# Patient Record
Sex: FEMALE | Race: BLACK OR AFRICAN AMERICAN | Employment: FULL TIME | URBAN - METROPOLITAN AREA
[De-identification: names, ages, dates, MRNs, and addresses within clinical notes are randomized per-mention and may not be internally consistent; named-entity substitution may affect disease eponyms.]

---

## 2017-01-11 RX ORDER — METOPROLOL SUCCINATE 100 MG/1
TABLET, EXTENDED RELEASE ORAL
Qty: 30 TABLET | Refills: 0 | Status: SHIPPED | OUTPATIENT
Start: 2017-01-11 | End: 2017-06-09

## 2017-01-11 RX ORDER — FUROSEMIDE 40 MG/1
TABLET ORAL
Qty: 30 TABLET | Refills: 0 | Status: SHIPPED | OUTPATIENT
Start: 2017-01-11 | End: 2017-03-22

## 2017-01-11 RX ORDER — ATORVASTATIN CALCIUM 10 MG/1
TABLET, FILM COATED ORAL
Qty: 30 TABLET | Refills: 0 | Status: SHIPPED | OUTPATIENT
Start: 2017-01-11 | End: 2017-06-09

## 2017-01-11 RX ORDER — LISINOPRIL 20 MG/1
TABLET ORAL
Qty: 30 TABLET | Refills: 0 | Status: SHIPPED | OUTPATIENT
Start: 2017-01-11 | End: 2017-03-22

## 2017-01-27 RX ORDER — ESCITALOPRAM OXALATE 10 MG/1
TABLET ORAL
Qty: 30 TABLET | Refills: 0 | Status: SHIPPED | OUTPATIENT
Start: 2017-01-27 | End: 2017-03-22

## 2017-03-22 RX ORDER — METOPROLOL SUCCINATE 100 MG/1
TABLET, EXTENDED RELEASE ORAL
Qty: 30 TABLET | Refills: 0 | Status: SHIPPED | OUTPATIENT
Start: 2017-03-22 | End: 2017-06-09

## 2017-03-22 RX ORDER — FUROSEMIDE 40 MG/1
TABLET ORAL
Qty: 30 TABLET | Refills: 0 | Status: SHIPPED | OUTPATIENT
Start: 2017-03-22 | End: 2017-04-06

## 2017-03-22 RX ORDER — LISINOPRIL 20 MG/1
TABLET ORAL
Qty: 30 TABLET | Refills: 0 | Status: SHIPPED | OUTPATIENT
Start: 2017-03-22 | End: 2017-04-06

## 2017-03-23 RX ORDER — ESCITALOPRAM OXALATE 10 MG/1
TABLET ORAL
Qty: 30 TABLET | Refills: 0 | Status: SHIPPED | OUTPATIENT
Start: 2017-03-23 | End: 2017-04-07

## 2017-04-07 RX ORDER — LISINOPRIL 20 MG/1
TABLET ORAL
Qty: 30 TABLET | Refills: 0 | Status: SHIPPED | OUTPATIENT
Start: 2017-04-07 | End: 2017-06-08

## 2017-04-07 RX ORDER — METOPROLOL SUCCINATE 100 MG/1
TABLET, EXTENDED RELEASE ORAL
Qty: 30 TABLET | Refills: 0 | Status: SHIPPED | OUTPATIENT
Start: 2017-04-07 | End: 2017-05-08

## 2017-04-07 RX ORDER — ESCITALOPRAM OXALATE 10 MG/1
TABLET ORAL
Qty: 30 TABLET | Refills: 0 | Status: SHIPPED | OUTPATIENT
Start: 2017-04-07 | End: 2017-05-08

## 2017-04-07 RX ORDER — FUROSEMIDE 40 MG/1
TABLET ORAL
Qty: 30 TABLET | Refills: 0 | Status: SHIPPED | OUTPATIENT
Start: 2017-04-07 | End: 2017-06-08

## 2017-05-08 RX ORDER — METOPROLOL SUCCINATE 100 MG/1
100 TABLET, EXTENDED RELEASE ORAL
Qty: 15 TABLET | Refills: 0 | Status: SHIPPED | OUTPATIENT
Start: 2017-05-08 | End: 2017-06-09

## 2017-05-08 RX ORDER — ESCITALOPRAM OXALATE 10 MG/1
TABLET ORAL
Qty: 15 TABLET | Refills: 0 | Status: SHIPPED | OUTPATIENT
Start: 2017-05-08 | End: 2017-06-09

## 2017-05-08 RX ORDER — LISINOPRIL 20 MG/1
20 TABLET ORAL
Qty: 15 TABLET | Refills: 0 | Status: SHIPPED | OUTPATIENT
Start: 2017-05-08 | End: 2017-06-09

## 2017-05-08 RX ORDER — FUROSEMIDE 40 MG/1
TABLET ORAL
Qty: 15 TABLET | Refills: 0 | Status: SHIPPED | OUTPATIENT
Start: 2017-05-08 | End: 2017-06-09

## 2017-06-09 ENCOUNTER — OFFICE VISIT (OUTPATIENT)
Dept: INTERNAL MEDICINE CLINIC | Facility: CLINIC | Age: 57
End: 2017-06-09

## 2017-06-09 VITALS
BODY MASS INDEX: 35 KG/M2 | HEART RATE: 73 BPM | SYSTOLIC BLOOD PRESSURE: 142 MMHG | WEIGHT: 222.5 LBS | DIASTOLIC BLOOD PRESSURE: 100 MMHG | TEMPERATURE: 98 F | OXYGEN SATURATION: 97 %

## 2017-06-09 DIAGNOSIS — F32.A DEPRESSION, UNSPECIFIED DEPRESSION TYPE: ICD-10-CM

## 2017-06-09 DIAGNOSIS — I10 ESSENTIAL HYPERTENSION, BENIGN: Primary | ICD-10-CM

## 2017-06-09 DIAGNOSIS — I25.9 CHRONIC ISCHEMIC HEART DISEASE, UNSPECIFIED: ICD-10-CM

## 2017-06-09 DIAGNOSIS — E78.2 MIXED HYPERLIPIDEMIA: ICD-10-CM

## 2017-06-09 DIAGNOSIS — E66.9 OBESITY, UNSPECIFIED: ICD-10-CM

## 2017-06-09 PROCEDURE — 99214 OFFICE O/P EST MOD 30 MIN: CPT | Performed by: INTERNAL MEDICINE

## 2017-06-09 RX ORDER — ATORVASTATIN CALCIUM 10 MG/1
10 TABLET, FILM COATED ORAL NIGHTLY
Qty: 30 TABLET | Refills: 3 | Status: SHIPPED | OUTPATIENT
Start: 2017-06-09 | End: 2017-11-30

## 2017-06-09 RX ORDER — ESCITALOPRAM OXALATE 10 MG/1
TABLET ORAL
Qty: 30 TABLET | Refills: 0 | Status: SHIPPED | OUTPATIENT
Start: 2017-06-09 | End: 2017-10-04

## 2017-06-09 RX ORDER — LISINOPRIL 20 MG/1
TABLET ORAL
Qty: 30 TABLET | Refills: 0 | Status: SHIPPED | OUTPATIENT
Start: 2017-06-09 | End: 2017-07-09

## 2017-06-09 RX ORDER — METOPROLOL SUCCINATE 100 MG/1
TABLET, EXTENDED RELEASE ORAL
Qty: 30 TABLET | Refills: 0 | Status: SHIPPED | OUTPATIENT
Start: 2017-06-09 | End: 2017-11-27

## 2017-06-09 RX ORDER — FUROSEMIDE 40 MG/1
TABLET ORAL
Qty: 30 TABLET | Refills: 0 | Status: SHIPPED | OUTPATIENT
Start: 2017-06-09 | End: 2018-04-13

## 2017-06-09 RX ORDER — SPIRONOLACTONE 25 MG/1
25 TABLET ORAL
Qty: 30 TABLET | Refills: 3 | Status: SHIPPED | OUTPATIENT
Start: 2017-06-09 | End: 2017-12-28

## 2017-06-09 NOTE — PROGRESS NOTES
Chitra Pratt is a 49year old female. HPI:   Patient presents with:  Medication Follow-Up  Patient presents for follow up on chronic medical issues.   Needs refills on lisinopril, metoprolol, atorvastatin, spironolactone, escitalopram.    Ran out of blo Take 1 tablet by mouth daily. , Disp: , Rfl:   •  aspirin 325 MG Oral Tab, Take 325 mg by mouth daily. , Disp: , Rfl:   •  [DISCONTINUED] lisinopril 20 MG Oral Tab, Take 1 tablet (20 mg total) by mouth once daily. , Disp: 15 tablet, Rfl: 0  •  [DISCONTINUED] CHF/ischemic heart disease  Occasional LE swelling. No worsening dyspnea on exertion, no chest pain. Continue BB, ACEi, spironolactone, furosemide. 3.  Depression  Worsening symptoms past few months - father passed away 3 months ago.   Escitalopram refi

## 2017-06-09 NOTE — PATIENT INSTRUCTIONS
- Take a daily allergy medication. Options include cetirizine, loratadine, and fexofenadine  - Also use steroid nasal sprays such as fluticasone, mometasone, Flonase, Nasocort, Nasonex, Rhinocort. It was a pleasure seeing you in the clinic today.   Than

## 2017-07-11 RX ORDER — LISINOPRIL 20 MG/1
TABLET ORAL
Qty: 30 TABLET | Refills: 0 | Status: SHIPPED | OUTPATIENT
Start: 2017-07-11 | End: 2017-10-04

## 2017-07-11 RX ORDER — FUROSEMIDE 40 MG/1
TABLET ORAL
Qty: 30 TABLET | Refills: 0 | Status: SHIPPED | OUTPATIENT
Start: 2017-07-11 | End: 2017-10-04

## 2017-10-04 RX ORDER — ERGOCALCIFEROL 1.25 MG/1
CAPSULE ORAL
Qty: 8 CAPSULE | Refills: 0 | OUTPATIENT
Start: 2017-10-04

## 2017-10-04 RX ORDER — FUROSEMIDE 40 MG/1
TABLET ORAL
Qty: 15 TABLET | Refills: 0 | OUTPATIENT
Start: 2017-10-04

## 2017-10-04 RX ORDER — ESCITALOPRAM OXALATE 10 MG/1
TABLET ORAL
Qty: 15 TABLET | Refills: 0 | OUTPATIENT
Start: 2017-10-04

## 2017-10-05 RX ORDER — ESCITALOPRAM OXALATE 10 MG/1
TABLET ORAL
Qty: 30 TABLET | Refills: 0 | Status: SHIPPED | OUTPATIENT
Start: 2017-10-05 | End: 2017-11-28

## 2017-10-05 RX ORDER — LISINOPRIL 20 MG/1
TABLET ORAL
Qty: 30 TABLET | Refills: 0 | Status: SHIPPED | OUTPATIENT
Start: 2017-10-05 | End: 2017-11-24

## 2017-10-05 RX ORDER — FUROSEMIDE 40 MG/1
TABLET ORAL
Qty: 30 TABLET | Refills: 0 | Status: SHIPPED | OUTPATIENT
Start: 2017-10-05 | End: 2017-11-22

## 2017-10-26 ENCOUNTER — TELEPHONE (OUTPATIENT)
Dept: INTERNAL MEDICINE CLINIC | Facility: CLINIC | Age: 57
End: 2017-10-26

## 2017-10-26 DIAGNOSIS — Z12.39 BREAST CANCER SCREENING: Primary | ICD-10-CM

## 2017-11-16 RX ORDER — ERGOCALCIFEROL 1.25 MG/1
CAPSULE ORAL
Qty: 8 CAPSULE | Refills: 0 | OUTPATIENT
Start: 2017-11-16

## 2017-11-24 RX ORDER — FUROSEMIDE 40 MG/1
TABLET ORAL
Qty: 30 TABLET | Refills: 0 | Status: SHIPPED | OUTPATIENT
Start: 2017-11-24 | End: 2018-02-27

## 2017-11-24 NOTE — TELEPHONE ENCOUNTER
Notes Recorded by Renan Austin MD on 9/29/2016 at 9:30 PM  Chol is not at goal  Taking meds as directed?  Otherwise stable    Ref Range & Units 9/29/16  1:31 PM   Glucose 70 - 99 mg/dL 102     BUN 8 - 20 mg/dL 16    Creatinine 0.55 - 1.02 mg/dL 1.21

## 2017-11-27 RX ORDER — LISINOPRIL 20 MG/1
TABLET ORAL
Qty: 30 TABLET | Refills: 0 | Status: SHIPPED | OUTPATIENT
Start: 2017-11-27 | End: 2018-04-13

## 2017-11-28 RX ORDER — METOPROLOL SUCCINATE 100 MG/1
TABLET, EXTENDED RELEASE ORAL
Qty: 30 TABLET | Refills: 0 | Status: SHIPPED | OUTPATIENT
Start: 2017-11-28 | End: 2018-04-13

## 2017-11-28 NOTE — TELEPHONE ENCOUNTER
Medication(s) to Refill:   Pending Prescriptions Disp Refills    METOPROLOL SUCCINATE  MG Oral Tablet 24 Hr [Pharmacy Med Name: METOPROLOL ER SUCCINATE 100MG TABS] 30 tablet 0     Sig: TAKE 1 TABLET BY MOUTH ONCE DAILY           Last Time Medication

## 2017-11-29 RX ORDER — LISINOPRIL 20 MG/1
TABLET ORAL
Qty: 30 TABLET | Refills: 0 | OUTPATIENT
Start: 2017-11-29

## 2017-11-29 RX ORDER — ESCITALOPRAM OXALATE 10 MG/1
TABLET ORAL
Qty: 30 TABLET | Refills: 0 | Status: SHIPPED | OUTPATIENT
Start: 2017-11-29 | End: 2017-11-30

## 2017-11-30 ENCOUNTER — PRIOR ORIGINAL RECORDS (OUTPATIENT)
Dept: OTHER | Age: 57
End: 2017-11-30

## 2017-11-30 ENCOUNTER — LAB ENCOUNTER (OUTPATIENT)
Dept: LAB | Age: 57
End: 2017-11-30
Attending: FAMILY MEDICINE
Payer: COMMERCIAL

## 2017-11-30 ENCOUNTER — OFFICE VISIT (OUTPATIENT)
Dept: INTERNAL MEDICINE CLINIC | Facility: CLINIC | Age: 57
End: 2017-11-30

## 2017-11-30 ENCOUNTER — HOSPITAL ENCOUNTER (OUTPATIENT)
Dept: MAMMOGRAPHY | Age: 57
Discharge: HOME | End: 2017-11-30
Attending: FAMILY MEDICINE
Payer: COMMERCIAL

## 2017-11-30 VITALS
WEIGHT: 222.5 LBS | SYSTOLIC BLOOD PRESSURE: 132 MMHG | OXYGEN SATURATION: 96 % | HEART RATE: 85 BPM | HEIGHT: 67.5 IN | RESPIRATION RATE: 16 BRPM | DIASTOLIC BLOOD PRESSURE: 86 MMHG | BODY MASS INDEX: 34.51 KG/M2 | TEMPERATURE: 99 F

## 2017-11-30 DIAGNOSIS — Z12.31 VISIT FOR SCREENING MAMMOGRAM: ICD-10-CM

## 2017-11-30 DIAGNOSIS — F17.200 TOBACCO USE DISORDER: ICD-10-CM

## 2017-11-30 DIAGNOSIS — Z01.89 LABORATORY EXAMINATION: ICD-10-CM

## 2017-11-30 DIAGNOSIS — Z12.11 COLON CANCER SCREENING: ICD-10-CM

## 2017-11-30 DIAGNOSIS — Z23 NEEDS FLU SHOT: ICD-10-CM

## 2017-11-30 DIAGNOSIS — Z23 NEED FOR DIPHTHERIA-TETANUS-PERTUSSIS (TDAP) VACCINE: ICD-10-CM

## 2017-11-30 DIAGNOSIS — Z00.00 WELLNESS EXAMINATION: Primary | ICD-10-CM

## 2017-11-30 PROCEDURE — 81003 URINALYSIS AUTO W/O SCOPE: CPT

## 2017-11-30 PROCEDURE — 90715 TDAP VACCINE 7 YRS/> IM: CPT | Performed by: FAMILY MEDICINE

## 2017-11-30 PROCEDURE — 99396 PREV VISIT EST AGE 40-64: CPT | Performed by: FAMILY MEDICINE

## 2017-11-30 PROCEDURE — 87624 HPV HI-RISK TYP POOLED RSLT: CPT | Performed by: FAMILY MEDICINE

## 2017-11-30 PROCEDURE — 88175 CYTOPATH C/V AUTO FLUID REDO: CPT | Performed by: FAMILY MEDICINE

## 2017-11-30 PROCEDURE — 90472 IMMUNIZATION ADMIN EACH ADD: CPT | Performed by: FAMILY MEDICINE

## 2017-11-30 PROCEDURE — 36415 COLL VENOUS BLD VENIPUNCTURE: CPT

## 2017-11-30 PROCEDURE — 90471 IMMUNIZATION ADMIN: CPT | Performed by: FAMILY MEDICINE

## 2017-11-30 PROCEDURE — 84443 ASSAY THYROID STIM HORMONE: CPT

## 2017-11-30 PROCEDURE — 86803 HEPATITIS C AB TEST: CPT

## 2017-11-30 PROCEDURE — 80061 LIPID PANEL: CPT

## 2017-11-30 PROCEDURE — 77067 SCR MAMMO BI INCL CAD: CPT | Performed by: FAMILY MEDICINE

## 2017-11-30 PROCEDURE — 85025 COMPLETE CBC W/AUTO DIFF WBC: CPT

## 2017-11-30 PROCEDURE — 80053 COMPREHEN METABOLIC PANEL: CPT

## 2017-11-30 PROCEDURE — 82306 VITAMIN D 25 HYDROXY: CPT

## 2017-11-30 PROCEDURE — 90686 IIV4 VACC NO PRSV 0.5 ML IM: CPT | Performed by: FAMILY MEDICINE

## 2017-11-30 RX ORDER — ESCITALOPRAM OXALATE 20 MG/1
10 TABLET ORAL
Qty: 30 TABLET | Refills: 5 | Status: SHIPPED | OUTPATIENT
Start: 2017-11-30 | End: 2020-08-18

## 2017-11-30 RX ORDER — ATORVASTATIN CALCIUM 10 MG/1
10 TABLET, FILM COATED ORAL NIGHTLY
Qty: 90 TABLET | Refills: 1 | Status: SHIPPED | OUTPATIENT
Start: 2017-11-30 | End: 2020-11-03

## 2017-11-30 RX ORDER — CYCLOBENZAPRINE HCL 10 MG
10 TABLET ORAL NIGHTLY
Qty: 30 TABLET | Refills: 1 | Status: SHIPPED | OUTPATIENT
Start: 2017-11-30 | End: 2017-12-20

## 2017-11-30 NOTE — PROGRESS NOTES
HPI:    Patient ID: Alma Garcia is a 56year old female.     HPI  HPI:   Alma Garcia is a 56year old female who presents for a complete physical exam. Symptoms: is menopausal.  Getting occ hot flashes   No menses   Asking for meds to help       Wt Readin FORMULA) Oral Tab Take 1 tablet by mouth daily. Disp:  Rfl:    aspirin 325 MG Oral Tab Take 325 mg by mouth daily.  Disp:  Rfl:       Past Medical History:   Diagnosis Date   • CAD (coronary artery disease)    • Myocardial infarction May, 1, 2012   • Other BMI 34.33 kg/m²   Body mass index is 34.33 kg/m².    GENERAL: well developed, well nourished,in no apparent distress  SKIN: no rashes,no unusual lesions  HEENT: atraumatic, normocephalic,ears and throat are clear  EYES:PERRLA, EOMI, ,conjunctiva are clear TAKE 1 TABLET BY MOUTH ONCE EVERY DAY Disp: 30 tablet Rfl: 0   atorvastatin 10 MG Oral Tab Take 1 tablet (10 mg total) by mouth nightly. Disp: 30 tablet Rfl: 3   spironolactone 25 MG Oral Tab Take 1 tablet (25 mg total) by mouth once daily.  Disp: 30 tablet

## 2017-12-05 DIAGNOSIS — E55.9 VITAMIN D DEFICIENCY: Primary | ICD-10-CM

## 2017-12-05 RX ORDER — ERGOCALCIFEROL 1.25 MG/1
50000 CAPSULE ORAL WEEKLY
Qty: 12 CAPSULE | Refills: 1 | Status: SHIPPED | OUTPATIENT
Start: 2017-12-05 | End: 2020-06-24

## 2017-12-26 RX ORDER — ESCITALOPRAM OXALATE 10 MG/1
TABLET ORAL
Qty: 30 TABLET | Refills: 0 | OUTPATIENT
Start: 2017-12-26

## 2017-12-29 RX ORDER — SPIRONOLACTONE 25 MG/1
TABLET ORAL
Qty: 30 TABLET | Refills: 0 | Status: SHIPPED | OUTPATIENT
Start: 2017-12-29 | End: 2018-04-11

## 2017-12-29 NOTE — TELEPHONE ENCOUNTER
Spironolactone 25 mg 1 tab daily filled 6-9-17 30 with 3 refills     LOV 11-30-17     The patient is asked to return for CPX in 1yr.     Labs 11-30-17 Overall is stable        New HIGH worning came up interactions with lisinopril

## 2018-01-30 RX ORDER — METOPROLOL SUCCINATE 100 MG/1
TABLET, EXTENDED RELEASE ORAL
Qty: 30 TABLET | Refills: 3 | Status: SHIPPED | OUTPATIENT
Start: 2018-01-30 | End: 2018-04-13

## 2018-02-27 RX ORDER — FUROSEMIDE 40 MG/1
TABLET ORAL
Qty: 30 TABLET | Refills: 0 | Status: SHIPPED | OUTPATIENT
Start: 2018-02-27 | End: 2018-04-13

## 2018-04-12 ENCOUNTER — TELEPHONE (OUTPATIENT)
Dept: INTERNAL MEDICINE CLINIC | Facility: CLINIC | Age: 58
End: 2018-04-12

## 2018-04-12 RX ORDER — SPIRONOLACTONE 25 MG/1
TABLET ORAL
Qty: 30 TABLET | Refills: 1 | Status: SHIPPED | OUTPATIENT
Start: 2018-04-12 | End: 2018-10-24

## 2018-04-12 NOTE — TELEPHONE ENCOUNTER
Patient calling in stated she is experiencing circulation issues. Stated her toe nails are discolored. Shade of dark brown. No feeling of pain.

## 2018-04-13 ENCOUNTER — OFFICE VISIT (OUTPATIENT)
Dept: INTERNAL MEDICINE CLINIC | Facility: CLINIC | Age: 58
End: 2018-04-13

## 2018-04-13 VITALS
RESPIRATION RATE: 16 BRPM | OXYGEN SATURATION: 99 % | WEIGHT: 227 LBS | BODY MASS INDEX: 35.63 KG/M2 | HEIGHT: 67 IN | HEART RATE: 107 BPM | SYSTOLIC BLOOD PRESSURE: 130 MMHG | DIASTOLIC BLOOD PRESSURE: 90 MMHG | TEMPERATURE: 99 F

## 2018-04-13 DIAGNOSIS — B35.1 ONYCHOMYCOSIS: Primary | ICD-10-CM

## 2018-04-13 DIAGNOSIS — R05.9 COUGH: ICD-10-CM

## 2018-04-13 DIAGNOSIS — D17.21 LIPOMA OF RIGHT UPPER EXTREMITY: ICD-10-CM

## 2018-04-13 DIAGNOSIS — I10 ESSENTIAL HYPERTENSION, BENIGN: ICD-10-CM

## 2018-04-13 DIAGNOSIS — Z12.11 COLON CANCER SCREENING: ICD-10-CM

## 2018-04-13 DIAGNOSIS — R20.2 TINGLING IN EXTREMITIES: ICD-10-CM

## 2018-04-13 DIAGNOSIS — I25.9 CHRONIC ISCHEMIC HEART DISEASE, UNSPECIFIED: ICD-10-CM

## 2018-04-13 PROCEDURE — 99214 OFFICE O/P EST MOD 30 MIN: CPT | Performed by: FAMILY MEDICINE

## 2018-04-13 RX ORDER — VALSARTAN 160 MG/1
160 TABLET ORAL DAILY
Qty: 30 TABLET | Refills: 1 | Status: SHIPPED | OUTPATIENT
Start: 2018-04-13 | End: 2018-08-23 | Stop reason: ALTCHOICE

## 2018-04-13 RX ORDER — METOPROLOL SUCCINATE 100 MG/1
100 TABLET, EXTENDED RELEASE ORAL
Qty: 30 TABLET | Refills: 1 | Status: SHIPPED | OUTPATIENT
Start: 2018-04-13 | End: 2020-06-10

## 2018-04-13 RX ORDER — FUROSEMIDE 40 MG/1
TABLET ORAL
Qty: 30 TABLET | Refills: 1 | Status: SHIPPED | OUTPATIENT
Start: 2018-04-13 | End: 2020-06-30

## 2018-04-13 NOTE — PROGRESS NOTES
HPI:    Patient ID: Vito Josselin is a 56year old female.     HPI  Here for few issues  toenails are discolored  Great toe nails     Noted after took polish off a yr ago    Used otc fungal meds wo relief      No pain     Fingernails OK   No injury aware of Allergies:  Hydrochlorothiazide     Rash    Comment:\"TABS\"  Lisinopril              Nausea only    Comment:\"TABS\"  [facility]*    Rash   PHYSICAL EXAM:   Physical Exam   Constitutional: No distress. Neck: No JVD present.    Cardiovascular Imaging & Referrals:  DERM - INTERNAL  CARDIO - INTERNAL       FF#[id number]

## 2018-05-12 RX ORDER — FUROSEMIDE 40 MG/1
TABLET ORAL
Qty: 30 TABLET | Refills: 2 | Status: SHIPPED | OUTPATIENT
Start: 2018-05-12 | End: 2020-06-09

## 2018-05-15 ENCOUNTER — PRIOR ORIGINAL RECORDS (OUTPATIENT)
Dept: OTHER | Age: 58
End: 2018-05-15

## 2018-05-22 LAB
ALBUMIN: 4 G/DL
ALKALINE PHOSPHATATE(ALK PHOS): 87 IU/L
ALT (SGPT): 57 U/L
AST (SGOT): 33 U/L
BILIRUBIN TOTAL: 0.5 MG/DL
BUN: 15 MG/DL
CALCIUM: 9.5 MG/DL
CHLORIDE: 107 MEQ/L
CHOLESTEROL, TOTAL: 185 MG/DL
CHOLESTEROL, TOTAL: 185 MG/DL
CREATININE, SERUM: 1.28 MG/DL
GLUCOSE: 97 MG/DL
GLUCOSE: 97 MG/DL
HDL CHOLESTEROL: 73 MG/DL
HDL CHOLESTEROL: 73 MG/DL
HEMATOCRIT: 41.9 %
HEMOGLOBIN: 14.3 G/DL
LDL CHOLESTEROL: 89 MG/DL
LDL CHOLESTEROL: 89 MG/DL
NON-HDL CHOLESTEROL: 112 MG/DL
NON-HDL CHOLESTEROL: 112 MG/DL
PLATELETS: 305 K/UL
POTASSIUM, SERUM: 4.5 MEQ/L
PROTEIN, TOTAL: 7.9 G/DL
RED BLOOD COUNT: 4.72 X 10-6/U
SGOT (AST): 33 IU/L
SGPT (ALT): 57 IU/L
SODIUM: 143 MEQ/L
THYROID STIMULATING HORMONE: 1.22 MLU/L
TOTAL CHOLESTEROL / HDL RATIO: 2.53 RATIO UN
TRIGLYCERIDES: 115 MG/DL
TRIGLYCERIDES: 115 MG/DL
VITAMIN D 25-OH: 27.5 NG/ML
WHITE BLOOD COUNT: 7.4 X 10-3/U

## 2018-06-08 ENCOUNTER — APPOINTMENT (OUTPATIENT)
Dept: LAB | Facility: HOSPITAL | Age: 58
End: 2018-06-08
Attending: FAMILY MEDICINE
Payer: COMMERCIAL

## 2018-06-08 ENCOUNTER — HOSPITAL ENCOUNTER (OUTPATIENT)
Dept: CV DIAGNOSTICS | Facility: HOSPITAL | Age: 58
End: 2018-06-08
Attending: INTERNAL MEDICINE
Payer: COMMERCIAL

## 2018-06-08 ENCOUNTER — HOSPITAL ENCOUNTER (OUTPATIENT)
Dept: LAB | Facility: HOSPITAL | Age: 58
Discharge: HOME | End: 2018-06-08
Attending: FAMILY MEDICINE
Payer: COMMERCIAL

## 2018-06-08 ENCOUNTER — PRIOR ORIGINAL RECORDS (OUTPATIENT)
Dept: OTHER | Age: 58
End: 2018-06-08

## 2018-06-08 PROCEDURE — 83880 ASSAY OF NATRIURETIC PEPTIDE: CPT | Performed by: FAMILY MEDICINE

## 2018-06-08 PROCEDURE — 36415 COLL VENOUS BLD VENIPUNCTURE: CPT | Performed by: FAMILY MEDICINE

## 2018-06-08 PROCEDURE — 84443 ASSAY THYROID STIM HORMONE: CPT | Performed by: FAMILY MEDICINE

## 2018-06-08 PROCEDURE — 85025 COMPLETE CBC W/AUTO DIFF WBC: CPT | Performed by: FAMILY MEDICINE

## 2018-06-08 PROCEDURE — 80053 COMPREHEN METABOLIC PANEL: CPT | Performed by: FAMILY MEDICINE

## 2018-06-08 PROCEDURE — 80061 LIPID PANEL: CPT | Performed by: FAMILY MEDICINE

## 2018-06-15 RX ORDER — FUROSEMIDE 40 MG/1
TABLET ORAL
Qty: 30 TABLET | Refills: 0 | OUTPATIENT
Start: 2018-06-15

## 2018-06-18 ENCOUNTER — PRIOR ORIGINAL RECORDS (OUTPATIENT)
Dept: OTHER | Age: 58
End: 2018-06-18

## 2018-06-26 ENCOUNTER — MYAURORA ACCOUNT LINK (OUTPATIENT)
Dept: OTHER | Age: 58
End: 2018-06-26

## 2018-06-26 ENCOUNTER — PRIOR ORIGINAL RECORDS (OUTPATIENT)
Dept: OTHER | Age: 58
End: 2018-06-26

## 2018-07-11 ENCOUNTER — TELEPHONE (OUTPATIENT)
Dept: INTERNAL MEDICINE CLINIC | Facility: CLINIC | Age: 58
End: 2018-07-11

## 2018-07-17 LAB
ALBUMIN: 4 G/DL
ALKALINE PHOSPHATATE(ALK PHOS): 88 IU/L
BILIRUBIN TOTAL: 0.5 MG/DL
BNP: 17 PMOL/L
BUN: 15 MG/DL
CALCIUM: 9.2 MG/DL
CHLORIDE: 110 MEQ/L
CHOLESTEROL, TOTAL: 164 MG/DL
CREATININE, SERUM: 1.35 MG/DL
GLUCOSE: 109 MG/DL
HDL CHOLESTEROL: 55 MG/DL
HEMATOCRIT: 40.2 %
HEMOGLOBIN: 14.1 G/DL
LDL CHOLESTEROL: 90 MG/DL
PLATELETS: 270 K/UL
POTASSIUM, SERUM: 4.3 MEQ/L
PROTEIN, TOTAL: 7.6 G/DL
RED BLOOD COUNT: 4.65 X 10-6/U
SGOT (AST): 27 IU/L
SGPT (ALT): 47 IU/L
SODIUM: 142 MEQ/L
THYROID STIMULATING HORMONE: 1.82 MLU/L
TRIGLYCERIDES: 94 MG/DL
WHITE BLOOD COUNT: 8.2 X 10-3/U

## 2018-08-23 ENCOUNTER — TELEPHONE (OUTPATIENT)
Dept: INTERNAL MEDICINE CLINIC | Facility: CLINIC | Age: 58
End: 2018-08-23

## 2018-08-23 RX ORDER — IRBESARTAN 300 MG/1
300 TABLET ORAL DAILY
Qty: 90 TABLET | Refills: 0 | Status: SHIPPED | OUTPATIENT
Start: 2018-08-23 | End: 2020-06-10

## 2018-08-23 NOTE — TELEPHONE ENCOUNTER
High bld pressure valsartin was recalled and she needs a replacement sent to her pharmacy [facility], patient is all out

## 2018-10-25 RX ORDER — SPIRONOLACTONE 25 MG/1
TABLET ORAL
Qty: 30 TABLET | Refills: 0 | Status: SHIPPED | OUTPATIENT
Start: 2018-10-25 | End: 2020-06-30 | Stop reason: ALTCHOICE

## 2018-10-31 RX ORDER — ESCITALOPRAM OXALATE 10 MG/1
TABLET ORAL
Qty: 30 TABLET | Refills: 3 | Status: SHIPPED | OUTPATIENT
Start: 2018-10-31 | End: 2020-08-18

## 2019-02-28 VITALS
HEIGHT: 68 IN | SYSTOLIC BLOOD PRESSURE: 152 MMHG | WEIGHT: 224 LBS | HEART RATE: 72 BPM | DIASTOLIC BLOOD PRESSURE: 90 MMHG | BODY MASS INDEX: 33.95 KG/M2

## 2019-02-28 VITALS
BODY MASS INDEX: 33.95 KG/M2 | HEART RATE: 76 BPM | DIASTOLIC BLOOD PRESSURE: 88 MMHG | SYSTOLIC BLOOD PRESSURE: 122 MMHG | WEIGHT: 224 LBS | HEIGHT: 68 IN

## 2019-04-29 PROBLEM — S76.111A STRAIN OF RIGHT QUADRICEPS: Status: ACTIVE | Noted: 2019-04-29

## 2019-08-23 ENCOUNTER — LAB ENCOUNTER (OUTPATIENT)
Dept: LAB | Age: 59
End: 2019-08-23
Attending: FAMILY MEDICINE
Payer: MEDICAID

## 2019-08-23 DIAGNOSIS — E55.9 AVITAMINOSIS D: ICD-10-CM

## 2019-08-23 DIAGNOSIS — I25.10 CORONARY ATHEROSCLEROSIS OF NATIVE CORONARY ARTERY: Primary | ICD-10-CM

## 2019-08-23 DIAGNOSIS — R63.5 ABNORMAL WEIGHT GAIN: ICD-10-CM

## 2019-08-23 LAB
ALBUMIN SERPL-MCNC: 4.1 G/DL (ref 3.4–5)
ALBUMIN/GLOB SERPL: 1.2 {RATIO} (ref 1–2)
ALP LIVER SERPL-CCNC: 82 U/L (ref 46–118)
ALT SERPL-CCNC: 46 U/L (ref 13–56)
ANION GAP SERPL CALC-SCNC: 7 MMOL/L (ref 0–18)
AST SERPL-CCNC: 32 U/L (ref 15–37)
BILIRUB SERPL-MCNC: 0.7 MG/DL (ref 0.1–2)
BUN BLD-MCNC: 16 MG/DL (ref 7–18)
BUN/CREAT SERPL: 13.4 (ref 10–20)
CALCIUM BLD-MCNC: 9.1 MG/DL (ref 8.5–10.1)
CHLORIDE SERPL-SCNC: 108 MMOL/L (ref 98–112)
CHOLEST SMN-MCNC: 205 MG/DL (ref ?–200)
CO2 SERPL-SCNC: 28 MMOL/L (ref 21–32)
CREAT BLD-MCNC: 1.19 MG/DL (ref 0.55–1.02)
EST. AVERAGE GLUCOSE BLD GHB EST-MCNC: 137 MG/DL (ref 68–126)
GLOBULIN PLAS-MCNC: 3.4 G/DL (ref 2.8–4.4)
GLUCOSE BLD-MCNC: 106 MG/DL (ref 70–99)
HBA1C MFR BLD HPLC: 6.4 % (ref ?–5.7)
HDLC SERPL-MCNC: 51 MG/DL (ref 40–59)
LDLC SERPL CALC-MCNC: 129 MG/DL (ref ?–100)
M PROTEIN MFR SERPL ELPH: 7.5 G/DL (ref 6.4–8.2)
NONHDLC SERPL-MCNC: 154 MG/DL (ref ?–130)
OSMOLALITY SERPL CALC.SUM OF ELEC: 298 MOSM/KG (ref 275–295)
POTASSIUM SERPL-SCNC: 4.2 MMOL/L (ref 3.5–5.1)
SODIUM SERPL-SCNC: 143 MMOL/L (ref 136–145)
TRIGL SERPL-MCNC: 123 MG/DL (ref 30–149)
TSI SER-ACNC: 2.9 MIU/ML (ref 0.36–3.74)
VIT D+METAB SERPL-MCNC: 34.3 NG/ML (ref 30–100)
VLDLC SERPL CALC-MCNC: 25 MG/DL (ref 0–30)

## 2019-08-23 PROCEDURE — 80061 LIPID PANEL: CPT

## 2019-08-23 PROCEDURE — 84443 ASSAY THYROID STIM HORMONE: CPT

## 2019-08-23 PROCEDURE — 80053 COMPREHEN METABOLIC PANEL: CPT

## 2019-08-23 PROCEDURE — 36415 COLL VENOUS BLD VENIPUNCTURE: CPT

## 2019-08-23 PROCEDURE — 82306 VITAMIN D 25 HYDROXY: CPT

## 2019-08-23 PROCEDURE — 83036 HEMOGLOBIN GLYCOSYLATED A1C: CPT

## 2020-06-08 ENCOUNTER — TELEPHONE (OUTPATIENT)
Dept: INTERNAL MEDICINE CLINIC | Facility: CLINIC | Age: 60
End: 2020-06-08

## 2020-06-08 DIAGNOSIS — Z00.00 LABORATORY EXAMINATION ORDERED AS PART OF A ROUTINE GENERAL MEDICAL EXAMINATION: Primary | ICD-10-CM

## 2020-06-08 NOTE — TELEPHONE ENCOUNTER
Patient was seeing a different provider d/t insurance change. Now has [facility] health insurance, scheduled annual wellness visit with . Requesting lab orders prior to appt. Last OV 2018.   Orders requested if appropriate       Future Appointments   Date Misty Kowalski

## 2020-06-09 NOTE — TELEPHONE ENCOUNTER
Patient requesting refill for:  Metoprolol Succinate  MG Oral Tablet 24 Hr  Irbesartan 300 MG Oral Tab  FUROSEMIDE 40 MG Oral Tab    Has upcoming appointment, but needs refill until then    Future Appointments   Date Provider Mic Mota   6/30

## 2020-06-09 NOTE — TELEPHONE ENCOUNTER
Left detailed message on pt's vm, ok per HIPPA. Informing fasting labs in the system and to call central scheduling to set an apt w the lab.

## 2020-06-10 RX ORDER — FUROSEMIDE 40 MG/1
40 TABLET ORAL DAILY
Qty: 30 TABLET | Refills: 0 | Status: SHIPPED | OUTPATIENT
Start: 2020-06-10 | End: 2020-07-06

## 2020-06-10 RX ORDER — METOPROLOL SUCCINATE 100 MG/1
100 TABLET, EXTENDED RELEASE ORAL
Qty: 30 TABLET | Refills: 0 | Status: SHIPPED | OUTPATIENT
Start: 2020-06-10 | End: 2020-09-16

## 2020-06-10 RX ORDER — IRBESARTAN 300 MG/1
300 TABLET ORAL DAILY
Qty: 30 TABLET | Refills: 0 | Status: SHIPPED | OUTPATIENT
Start: 2020-06-10 | End: 2020-09-15

## 2020-06-19 ENCOUNTER — LAB ENCOUNTER (OUTPATIENT)
Dept: LAB | Age: 60
End: 2020-06-19
Attending: FAMILY MEDICINE
Payer: COMMERCIAL

## 2020-06-19 ENCOUNTER — HOSPITAL ENCOUNTER (OUTPATIENT)
Dept: MAMMOGRAPHY | Age: 60
Discharge: HOME | End: 2020-06-19
Attending: FAMILY MEDICINE
Payer: COMMERCIAL

## 2020-06-19 DIAGNOSIS — Z12.31 ENCOUNTER FOR SCREENING MAMMOGRAM FOR MALIGNANT NEOPLASM OF BREAST: ICD-10-CM

## 2020-06-19 DIAGNOSIS — Z00.00 LABORATORY EXAMINATION ORDERED AS PART OF A ROUTINE GENERAL MEDICAL EXAMINATION: ICD-10-CM

## 2020-06-19 PROCEDURE — 80061 LIPID PANEL: CPT

## 2020-06-19 PROCEDURE — 80053 COMPREHEN METABOLIC PANEL: CPT

## 2020-06-19 PROCEDURE — 83036 HEMOGLOBIN GLYCOSYLATED A1C: CPT

## 2020-06-19 PROCEDURE — 82306 VITAMIN D 25 HYDROXY: CPT

## 2020-06-19 PROCEDURE — 85025 COMPLETE CBC W/AUTO DIFF WBC: CPT

## 2020-06-19 PROCEDURE — 77067 SCR MAMMO BI INCL CAD: CPT | Performed by: FAMILY MEDICINE

## 2020-06-19 PROCEDURE — 77063 BREAST TOMOSYNTHESIS BI: CPT | Performed by: FAMILY MEDICINE

## 2020-06-19 PROCEDURE — 84443 ASSAY THYROID STIM HORMONE: CPT

## 2020-06-19 PROCEDURE — 36415 COLL VENOUS BLD VENIPUNCTURE: CPT

## 2020-06-24 RX ORDER — ERGOCALCIFEROL 1.25 MG/1
50000 CAPSULE ORAL WEEKLY
Qty: 12 CAPSULE | Refills: 1 | Status: SHIPPED | OUTPATIENT
Start: 2020-06-24 | End: 2020-12-21

## 2020-06-29 NOTE — PROGRESS NOTES
HPI:   Fidel Esquivel is a 64year old female who presents for a complete physical exam. Symptoms: denies discharge, itching, burning or dysuria, is menopausal. Patient complains of back spasms in her mid to lower back.  Pt states this has been going on for s none  Labs-6/19/20  DEXA over 65yr- n/a  Flu shot-  Due in the flu  Colon- due 5/22/28  Glasses/contacts- yes, last exam- over 2 years ago  Dental visits- 10/2019      Immunization History  Administered            Date(s) Administered    FLUZONE 3 Yrs+ [employer] daily. 30 tablet 0   • ESCITALOPRAM 10 MG Oral Tab TAKE 1 TABLET BY MOUTH EVERY DAY 30 tablet 3   • atorvastatin 10 MG Oral Tab Take 1 tablet (10 mg total) by mouth nightly.  90 tablet 1   • Multiple Vitamins-Minerals (ONE-A-DAY MENOPAUSE FORMULA) Oral Tab exertion,+smoker  CARDIOVASCULAR: denies chest pain on exertion,+HTN,+ PVD  GI: denies abdominal pain,denies heartburn  : denies dysuria, vaginal discharge or itching,periods regular   MUSCULOSKELETAL: + back pain,see HPI  NEURO: denies headaches  PSYCHE screening    - THINPREP PAP SMEAR B; Future  - HPV HIGH RISK , THIN PREP COLLECTION; Future  - THINPREP PAP SMEAR B  - HPV HIGH RISK , THIN PREP COLLECTION    2.  Need for pneumococcal vaccination    - PNEUMOCOCCAL IMM (PNEUMOVAX)    3. Spasm of muscle of l

## 2020-06-30 ENCOUNTER — OFFICE VISIT (OUTPATIENT)
Dept: INTERNAL MEDICINE CLINIC | Facility: CLINIC | Age: 60
End: 2020-06-30
Payer: COMMERCIAL

## 2020-06-30 VITALS
SYSTOLIC BLOOD PRESSURE: 132 MMHG | DIASTOLIC BLOOD PRESSURE: 76 MMHG | TEMPERATURE: 98 F | BODY MASS INDEX: 37.28 KG/M2 | WEIGHT: 237.5 LBS | HEIGHT: 67 IN | RESPIRATION RATE: 16 BRPM | HEART RATE: 82 BPM

## 2020-06-30 DIAGNOSIS — Z00.00 ROUTINE GENERAL MEDICAL EXAMINATION AT A HEALTH CARE FACILITY: Primary | ICD-10-CM

## 2020-06-30 DIAGNOSIS — E66.09 CLASS 2 OBESITY DUE TO EXCESS CALORIES WITHOUT SERIOUS COMORBIDITY WITH BODY MASS INDEX (BMI) OF 37.0 TO 37.9 IN ADULT: ICD-10-CM

## 2020-06-30 DIAGNOSIS — Z23 NEED FOR PNEUMOCOCCAL VACCINATION: ICD-10-CM

## 2020-06-30 DIAGNOSIS — M62.830 SPASM OF MUSCLE OF LOWER BACK: ICD-10-CM

## 2020-06-30 DIAGNOSIS — Z12.4 ENCOUNTER FOR PAPANICOLAOU SMEAR FOR CERVICAL CANCER SCREENING: ICD-10-CM

## 2020-06-30 PROCEDURE — 87624 HPV HI-RISK TYP POOLED RSLT: CPT | Performed by: FAMILY MEDICINE

## 2020-06-30 PROCEDURE — 88175 CYTOPATH C/V AUTO FLUID REDO: CPT | Performed by: FAMILY MEDICINE

## 2020-06-30 PROCEDURE — 90732 PPSV23 VACC 2 YRS+ SUBQ/IM: CPT | Performed by: FAMILY MEDICINE

## 2020-06-30 PROCEDURE — 90471 IMMUNIZATION ADMIN: CPT | Performed by: FAMILY MEDICINE

## 2020-06-30 PROCEDURE — 99396 PREV VISIT EST AGE 40-64: CPT | Performed by: FAMILY MEDICINE

## 2020-06-30 PROCEDURE — 99213 OFFICE O/P EST LOW 20 MIN: CPT | Performed by: FAMILY MEDICINE

## 2020-06-30 RX ORDER — METHOCARBAMOL 500 MG/1
500 TABLET, FILM COATED ORAL 3 TIMES DAILY PRN
Qty: 30 TABLET | Refills: 1 | Status: SHIPPED | OUTPATIENT
Start: 2020-06-30 | End: 2020-09-15

## 2020-07-01 LAB — HPV I/H RISK 1 DNA SPEC QL NAA+PROBE: NEGATIVE

## 2020-07-06 RX ORDER — FUROSEMIDE 40 MG/1
TABLET ORAL
Qty: 90 TABLET | Refills: 0 | Status: SHIPPED | OUTPATIENT
Start: 2020-07-06 | End: 2020-12-09

## 2020-07-06 NOTE — TELEPHONE ENCOUNTER
Furosemide 40 mg  Last OV relevant to medication: 6-30-20  Last refill date: 6-10-20 #/refills: 0  When pt was asked to return for OV: CPX 1yr  Upcoming appt/reason: none  Recent labs: 6-19-20: CMP

## 2020-08-18 ENCOUNTER — OFFICE VISIT (OUTPATIENT)
Dept: INTERNAL MEDICINE CLINIC | Facility: CLINIC | Age: 60
End: 2020-08-18
Payer: COMMERCIAL

## 2020-08-18 VITALS
BODY MASS INDEX: 37.67 KG/M2 | TEMPERATURE: 98 F | WEIGHT: 240 LBS | HEART RATE: 86 BPM | DIASTOLIC BLOOD PRESSURE: 78 MMHG | HEIGHT: 67 IN | RESPIRATION RATE: 18 BRPM | SYSTOLIC BLOOD PRESSURE: 138 MMHG

## 2020-08-18 DIAGNOSIS — M25.562 ACUTE PAIN OF LEFT KNEE: Primary | ICD-10-CM

## 2020-08-18 DIAGNOSIS — B35.1 ONYCHOMYCOSIS: ICD-10-CM

## 2020-08-18 LAB — URATE SERPL-MCNC: 5.2 MG/DL (ref 2.6–6)

## 2020-08-18 PROCEDURE — 84550 ASSAY OF BLOOD/URIC ACID: CPT | Performed by: FAMILY MEDICINE

## 2020-08-18 PROCEDURE — 3078F DIAST BP <80 MM HG: CPT | Performed by: FAMILY MEDICINE

## 2020-08-18 PROCEDURE — 3075F SYST BP GE 130 - 139MM HG: CPT | Performed by: FAMILY MEDICINE

## 2020-08-18 PROCEDURE — 99214 OFFICE O/P EST MOD 30 MIN: CPT | Performed by: FAMILY MEDICINE

## 2020-08-18 PROCEDURE — 3008F BODY MASS INDEX DOCD: CPT | Performed by: FAMILY MEDICINE

## 2020-08-18 RX ORDER — METHYLPREDNISOLONE 4 MG/1
TABLET ORAL
Qty: 1 KIT | Refills: 0 | Status: SHIPPED | OUTPATIENT
Start: 2020-08-18 | End: 2020-08-28 | Stop reason: ALTCHOICE

## 2020-08-18 RX ORDER — TRAMADOL HYDROCHLORIDE 50 MG/1
50 TABLET ORAL EVERY 6 HOURS PRN
Qty: 30 TABLET | Refills: 0 | Status: SHIPPED | OUTPATIENT
Start: 2020-08-18 | End: 2020-08-28 | Stop reason: ALTCHOICE

## 2020-08-18 RX ORDER — AMOXICILLIN AND CLAVULANATE POTASSIUM 875; 125 MG/1; MG/1
1 TABLET, FILM COATED ORAL 2 TIMES DAILY
Qty: 20 TABLET | Refills: 0 | Status: SHIPPED | OUTPATIENT
Start: 2020-08-18 | End: 2020-08-28 | Stop reason: ALTCHOICE

## 2020-08-18 NOTE — PROGRESS NOTES
Patient presents with:  Knee Pain:  L knee pain, has been going on for 2 weeks, warm to touch. Pt denies any heavy lifting or falls. Pt is using ice compresses and knee brace.  Pt states there is swelling, pt states there is always clicking and popping all daily. 30 tablet 0   • atorvastatin 10 MG Oral Tab Take 1 tablet (10 mg total) by mouth nightly. 90 tablet 1   • Multiple Vitamins-Minerals (ONE-A-DAY MENOPAUSE FORMULA) Oral Tab Take 1 tablet by mouth daily.      • aspirin 325 MG Oral Tab Take 325 mg by mo bruising/ecchymosis: no, but appears a little redden  - Tenderness: +  -  Range of motion: decreased due to pain and swelling  - joint laxity: no  - crepitus: +  - peripheral pulses: intact  - sensations: intact  - Motor exam strength: intact    ASSESSMENT

## 2020-08-24 ENCOUNTER — TELEPHONE (OUTPATIENT)
Dept: ORTHOPEDICS CLINIC | Facility: CLINIC | Age: 60
End: 2020-08-24

## 2020-08-24 ENCOUNTER — HOSPITAL ENCOUNTER (OUTPATIENT)
Dept: GENERAL RADIOLOGY | Age: 60
Discharge: HOME | End: 2020-08-24
Attending: NURSE PRACTITIONER
Payer: COMMERCIAL

## 2020-08-24 ENCOUNTER — TELEPHONE (OUTPATIENT)
Dept: INTERNAL MEDICINE CLINIC | Facility: CLINIC | Age: 60
End: 2020-08-24

## 2020-08-24 DIAGNOSIS — M25.562 LEFT KNEE PAIN, UNSPECIFIED CHRONICITY: ICD-10-CM

## 2020-08-24 DIAGNOSIS — M25.562 LEFT KNEE PAIN, UNSPECIFIED CHRONICITY: Primary | ICD-10-CM

## 2020-08-24 DIAGNOSIS — M25.561 RIGHT KNEE PAIN, UNSPECIFIED CHRONICITY: ICD-10-CM

## 2020-08-24 PROCEDURE — 73562 X-RAY EXAM OF KNEE 3: CPT | Performed by: NURSE PRACTITIONER

## 2020-08-24 PROCEDURE — 73564 X-RAY EXAM KNEE 4 OR MORE: CPT | Performed by: NURSE PRACTITIONER

## 2020-08-24 NOTE — TELEPHONE ENCOUNTER
Pt informed of test results from 8/18/2020. Patient stated she finished steroid yesterday and no improvement. Still swollen, painful, and making difficult to walk. Please advise.

## 2020-08-28 ENCOUNTER — OFFICE VISIT (OUTPATIENT)
Dept: ORTHOPEDICS CLINIC | Facility: CLINIC | Age: 60
End: 2020-08-28
Payer: COMMERCIAL

## 2020-08-28 VITALS — BODY MASS INDEX: 38 KG/M2 | OXYGEN SATURATION: 98 % | HEIGHT: 67 IN | HEART RATE: 85 BPM | RESPIRATION RATE: 18 BRPM

## 2020-08-28 DIAGNOSIS — M25.562 ACUTE PAIN OF LEFT KNEE: ICD-10-CM

## 2020-08-28 DIAGNOSIS — M25.462 EFFUSION OF LEFT KNEE: Primary | ICD-10-CM

## 2020-08-28 PROBLEM — I10 HYPERTENSIVE DISORDER: Status: ACTIVE | Noted: 2019-05-29

## 2020-08-28 PROCEDURE — 99214 OFFICE O/P EST MOD 30 MIN: CPT | Performed by: FAMILY MEDICINE

## 2020-08-28 RX ORDER — TRIAMCINOLONE ACETONIDE 40 MG/ML
40 INJECTION, SUSPENSION INTRA-ARTICULAR; INTRAMUSCULAR ONCE
Status: CANCELLED | OUTPATIENT
Start: 2020-08-28 | End: 2020-08-28

## 2020-08-28 RX ORDER — PREDNISONE 20 MG/1
40 TABLET ORAL DAILY
Qty: 10 TABLET | Refills: 0 | Status: SHIPPED | OUTPATIENT
Start: 2020-08-28 | End: 2020-09-02

## 2020-08-28 NOTE — PROGRESS NOTES
[facility] New Patient Note    CC: Patient presents with:  Knee Pain: Left Knee pain 3 weeks       HPI: This 64year old female presents today with complaints of 3 weeks of left anterior knee pain and swelling.  She notes that when the pain started th tablet 0   • atorvastatin 10 MG Oral Tab Take 1 tablet (10 mg total) by mouth nightly. 90 tablet 1   • Multiple Vitamins-Minerals (ONE-A-DAY MENOPAUSE FORMULA) Oral Tab Take 1 tablet by mouth daily. • aspirin 325 MG Oral Tab Take 325 mg by mouth daily. tender  Neurological: No defecits  Skin: Skin is warm and dry. No rash. Psychiatric: Normal mood and affect.    Musculoskeletal:  Gait: mildly antalgic  Evaluation of patient's left knee reveals moderate joint effusion, no significant erythema or warmth to total) by mouth daily for 5 days. Dispense: 10 tablet;  Refill: 0      Missy Hollins MD  [address] [facility]

## 2020-09-11 ENCOUNTER — OFFICE VISIT (OUTPATIENT)
Dept: ORTHOPEDICS CLINIC | Facility: CLINIC | Age: 60
End: 2020-09-11
Payer: COMMERCIAL

## 2020-09-11 VITALS — HEIGHT: 67 IN | HEART RATE: 98 BPM | BODY MASS INDEX: 38 KG/M2 | RESPIRATION RATE: 16 BRPM | OXYGEN SATURATION: 98 %

## 2020-09-11 DIAGNOSIS — M25.462 EFFUSION OF LEFT KNEE: Primary | ICD-10-CM

## 2020-09-11 DIAGNOSIS — M25.562 ACUTE PAIN OF LEFT KNEE: ICD-10-CM

## 2020-09-11 LAB
BASOPHIL SYNOVIAL FLUID: 0 %
CRYSTALS: NEGATIVE
EOSINOPHIL SYNOVIAL FLUID: 0 %
LYMPH SYNOVIAL FLUID: 29 %
MON/MAC/HIST SYNOVIAL FLUID: 64 %
NEUTROPHIL SYNOVIAL FLUID: 7 % (ref ?–20)
RBC SYNOVIAL FLUID: <3000 /MM3
TOTAL CELLS COUNTED: 100
WBC SYNOVIAL FLUID: 310 /MM3 (ref ?–150)

## 2020-09-11 PROCEDURE — 20610 DRAIN/INJ JOINT/BURSA W/O US: CPT | Performed by: FAMILY MEDICINE

## 2020-09-11 PROCEDURE — 89060 EXAM SYNOVIAL FLUID CRYSTALS: CPT | Performed by: FAMILY MEDICINE

## 2020-09-11 PROCEDURE — 89051 BODY FLUID CELL COUNT: CPT | Performed by: FAMILY MEDICINE

## 2020-09-11 PROCEDURE — 87205 SMEAR GRAM STAIN: CPT | Performed by: FAMILY MEDICINE

## 2020-09-11 PROCEDURE — 99213 OFFICE O/P EST LOW 20 MIN: CPT | Performed by: FAMILY MEDICINE

## 2020-09-11 PROCEDURE — 87070 CULTURE OTHR SPECIMN AEROBIC: CPT | Performed by: FAMILY MEDICINE

## 2020-09-11 RX ORDER — TRIAMCINOLONE ACETONIDE 40 MG/ML
40 INJECTION, SUSPENSION INTRA-ARTICULAR; INTRAMUSCULAR ONCE
Status: COMPLETED | OUTPATIENT
Start: 2020-09-11 | End: 2020-09-11

## 2020-09-11 RX ADMIN — TRIAMCINOLONE ACETONIDE 40 MG: 40 INJECTION, SUSPENSION INTRA-ARTICULAR; INTRAMUSCULAR at 15:50:00

## 2020-09-11 NOTE — PROGRESS NOTES
[facility] New Patient Note    CC: Patient presents with: Follow - Up: Left knee injection       HPI: This 64year old female presents today for follow-up of her left knee pain.   She notes that after completion of her oral steroid she did have some Other (CAD) Father    • Other (Other) Mother    • Other (stroke syndrome) Mother    • Other (HTN) Sister    • Stroke Neg    • Cancer Neg      Social History    Occupational History      Not on file    Tobacco Use      Smoking status: Current Every Day Smok pain with most maneuvers. Imaging: No new imaging      Assessment/Plan:  Kaci Mejia is presenting for follow-up of her left knee pain and effusion. She had mild improvement in her swelling and pain with her second course of oral steroids.   Her knee

## 2020-09-11 NOTE — PROGRESS NOTES
Procedure note:    After informed consent, the patient's left knee marked and prepped with antiseptic solution. Local anesthetic was used to create a skin wheal near the superolateral aspiration site.   It was reprepped with antiseptic solution, and an 25-

## 2020-09-15 DIAGNOSIS — M62.830 SPASM OF MUSCLE OF LOWER BACK: ICD-10-CM

## 2020-09-15 RX ORDER — IRBESARTAN 300 MG/1
TABLET ORAL
Qty: 90 TABLET | Refills: 0 | Status: SHIPPED | OUTPATIENT
Start: 2020-09-15 | End: 2021-01-11

## 2020-09-15 RX ORDER — METHOCARBAMOL 500 MG/1
TABLET, FILM COATED ORAL
Qty: 30 TABLET | Refills: 1 | Status: SHIPPED | OUTPATIENT
Start: 2020-09-15 | End: 2021-04-08

## 2020-09-15 NOTE — TELEPHONE ENCOUNTER
Methocarbamol 500 mg  Last OV relevant to medication: 8-18-20  Last refill date: 6-30-20 #/refills: 1  When pt was asked to return for OV: none  Upcoming appt/reason: none  Recent labs: none

## 2020-09-15 NOTE — TELEPHONE ENCOUNTER
LOV: 8/18/2020 with Renee Anton NP  RTC: no follow-up on file  Last Relevant Labs: June 2020  Filled: 6/10/2020  #30 with 0 refills    Future Appointments   Date Time Provider Mic Mota   10/9/2020  2:15 PM Danny Alfaro MD [facility]

## 2020-09-16 RX ORDER — METOPROLOL SUCCINATE 100 MG/1
TABLET, EXTENDED RELEASE ORAL
Qty: 30 TABLET | Refills: 2 | Status: SHIPPED | OUTPATIENT
Start: 2020-09-16 | End: 2021-01-11

## 2020-09-16 NOTE — TELEPHONE ENCOUNTER
LOV: 8/18/2020 with Will Ngo NP  RTC: no follow-up on file  Last Relevant Labs: 6/19/2020   Filled: 6/10/2020  #30 with 0 refills    Future Appointments   Date Time Provider Mic Mota   10/9/2020  2:15 PM Tera Phillips MD [facility]

## 2020-09-21 ENCOUNTER — TELEPHONE (OUTPATIENT)
Dept: INTERNAL MEDICINE CLINIC | Facility: CLINIC | Age: 60
End: 2020-09-21

## 2020-10-09 ENCOUNTER — TELEPHONE (OUTPATIENT)
Dept: ORTHOPEDICS CLINIC | Facility: CLINIC | Age: 60
End: 2020-10-09

## 2020-10-09 NOTE — TELEPHONE ENCOUNTER
Left message to inform patient she missed her 2:15 p.m. appointment today with Dr. Lei Herrera. Patient to call back to reschedule.

## 2020-11-03 ENCOUNTER — IMMUNIZATION (OUTPATIENT)
Dept: INTERNAL MEDICINE CLINIC | Facility: CLINIC | Age: 60
End: 2020-11-03
Payer: COMMERCIAL

## 2020-11-03 DIAGNOSIS — Z23 NEED FOR VACCINATION: ICD-10-CM

## 2020-11-03 PROCEDURE — 90471 IMMUNIZATION ADMIN: CPT | Performed by: FAMILY MEDICINE

## 2020-11-03 PROCEDURE — 90686 IIV4 VACC NO PRSV 0.5 ML IM: CPT | Performed by: FAMILY MEDICINE

## 2020-11-03 RX ORDER — ATORVASTATIN CALCIUM 10 MG/1
10 TABLET, FILM COATED ORAL NIGHTLY
Qty: 30 TABLET | Refills: 2 | Status: SHIPPED | OUTPATIENT
Start: 2020-11-03 | End: 2021-03-25

## 2020-11-03 NOTE — TELEPHONE ENCOUNTER
Patient is out of Atorvastatin. Patient aware she is due for labs in December 2020. Please order which labs you want patient to complete. Patient will go to [facility]. Last office visit 8/18/2020 with Parvin Walker NP.   Last labs 6/19/2020

## 2020-12-09 ENCOUNTER — TELEPHONE (OUTPATIENT)
Dept: INTERNAL MEDICINE CLINIC | Facility: CLINIC | Age: 60
End: 2020-12-09

## 2020-12-09 RX ORDER — FUROSEMIDE 40 MG/1
40 TABLET ORAL DAILY
Qty: 90 TABLET | Refills: 0 | Status: SHIPPED | OUTPATIENT
Start: 2020-12-09 | End: 2021-04-08

## 2020-12-09 RX ORDER — ESCITALOPRAM OXALATE 10 MG/1
10 TABLET ORAL DAILY
Qty: 30 TABLET | Refills: 0 | Status: SHIPPED | OUTPATIENT
Start: 2020-12-09 | End: 2021-01-11

## 2020-12-09 NOTE — TELEPHONE ENCOUNTER
Patient has been experiencing a lot of anxiety due to deaths in the family. She stated that she was on an antidepressant in the past and doesn't remember the name of the medication. She would like to speak with a nurse. Please advise.

## 2020-12-09 NOTE — TELEPHONE ENCOUNTER
Spoke with patient notified TO renewed rx for Lexapro 10mg daily, patient will follow up within a month. Patient verbalized understanding and agreeable to POC.

## 2020-12-09 NOTE — TELEPHONE ENCOUNTER
Patient is requesting a refill on her prescription for FUROSEMIDE 40 MG Oral Tab. Patient is completely out of her prescription and would like this refilled by today.  Needs to be sent to [facility] [phone]

## 2020-12-09 NOTE — TELEPHONE ENCOUNTER
Spoke with patient stating has been struggling with anxiety all this year, patient has had a few deaths in the family as well as bad incidents with family. She has taken Lexapro in the past 10mg which has helped her symptoms.  Patient denies hurting self or

## 2020-12-14 ENCOUNTER — TELEPHONE (OUTPATIENT)
Dept: INTERNAL MEDICINE CLINIC | Facility: CLINIC | Age: 60
End: 2020-12-14

## 2020-12-14 NOTE — TELEPHONE ENCOUNTER
Spoke with patient stating has chest pressure, chest pain, dull ache, does not feel like elephant is sitting on her chest, does come and go, patient advised to proceed to UC, she declined ER. Patient agreeable. She is concerned about this pressure.    NAZARIO lane

## 2021-01-11 RX ORDER — METOPROLOL SUCCINATE 100 MG/1
TABLET, EXTENDED RELEASE ORAL
Qty: 30 TABLET | Refills: 0 | Status: SHIPPED | OUTPATIENT
Start: 2021-01-11 | End: 2021-04-07

## 2021-01-11 RX ORDER — IRBESARTAN 300 MG/1
TABLET ORAL
Qty: 90 TABLET | Refills: 0 | Status: SHIPPED | OUTPATIENT
Start: 2021-01-11 | End: 2021-04-08

## 2021-01-11 RX ORDER — ESCITALOPRAM OXALATE 10 MG/1
20 TABLET ORAL DAILY
Qty: 90 TABLET | Refills: 0 | Status: SHIPPED | OUTPATIENT
Start: 2021-01-11 | End: 2021-04-08

## 2021-01-11 NOTE — TELEPHONE ENCOUNTER
Escitalopram 10mg last filled 12/9/20 #30    Irbesartan 300mg last filled 9/15/20 #90    LOV 8/18/20     Upcoming appt - none     Last labs 6/19/20

## 2021-03-10 RX ORDER — ATORVASTATIN CALCIUM 10 MG/1
TABLET, FILM COATED ORAL
Qty: 30 TABLET | Refills: 2 | OUTPATIENT
Start: 2021-03-10

## 2021-03-10 NOTE — TELEPHONE ENCOUNTER
LOV: 8/18/2020 with GABBY Jain  RTC: no follow-up on file  Last Relevant Labs: 8/18/2020 (URIC ACID)  Last lipid 6/19/2020   Filled: 11/3/2020  #30 with 2 refills    No future appointments.

## 2021-03-20 DIAGNOSIS — Z23 NEED FOR VACCINATION: ICD-10-CM

## 2021-03-25 RX ORDER — ATORVASTATIN CALCIUM 10 MG/1
TABLET, FILM COATED ORAL
Qty: 30 TABLET | Refills: 2 | Status: SHIPPED | OUTPATIENT
Start: 2021-03-25 | End: 2021-04-08

## 2021-03-25 RX ORDER — METOPROLOL SUCCINATE 100 MG/1
TABLET, EXTENDED RELEASE ORAL
Qty: 30 TABLET | Refills: 0 | OUTPATIENT
Start: 2021-03-25

## 2021-04-07 RX ORDER — ATORVASTATIN CALCIUM 10 MG/1
10 TABLET, FILM COATED ORAL NIGHTLY
Qty: 30 TABLET | Refills: 2 | Status: CANCELLED | OUTPATIENT
Start: 2021-04-07

## 2021-04-07 NOTE — TELEPHONE ENCOUNTER
Patient scheduled 6 month fu. Future Appointments   Date Time Provider Mic Svetlana   4/8/2021  3:00 PM Mary Kay Abreu MD [facility] [place]     Patient stated she is out of medication.      [facility] [address] [place] [address] [facility] [address] 6, [phone], [phone]

## 2021-04-08 ENCOUNTER — OFFICE VISIT (OUTPATIENT)
Dept: INTERNAL MEDICINE CLINIC | Facility: CLINIC | Age: 61
End: 2021-04-08
Payer: COMMERCIAL

## 2021-04-08 VITALS
DIASTOLIC BLOOD PRESSURE: 79 MMHG | TEMPERATURE: 99 F | SYSTOLIC BLOOD PRESSURE: 137 MMHG | OXYGEN SATURATION: 99 % | HEART RATE: 77 BPM | BODY MASS INDEX: 39.31 KG/M2 | RESPIRATION RATE: 12 BRPM | WEIGHT: 250.5 LBS | HEIGHT: 67 IN

## 2021-04-08 DIAGNOSIS — I25.9 CHRONIC ISCHEMIC HEART DISEASE, UNSPECIFIED: Primary | ICD-10-CM

## 2021-04-08 DIAGNOSIS — F17.200 TOBACCO USE DISORDER: ICD-10-CM

## 2021-04-08 DIAGNOSIS — I10 ESSENTIAL HYPERTENSION, BENIGN: ICD-10-CM

## 2021-04-08 DIAGNOSIS — F34.1 DYSTHYMIA: ICD-10-CM

## 2021-04-08 DIAGNOSIS — M62.830 SPASM OF MUSCLE OF LOWER BACK: ICD-10-CM

## 2021-04-08 DIAGNOSIS — E66.9 OBESITY (BMI 30-39.9): ICD-10-CM

## 2021-04-08 PROCEDURE — 3075F SYST BP GE 130 - 139MM HG: CPT | Performed by: FAMILY MEDICINE

## 2021-04-08 PROCEDURE — 3008F BODY MASS INDEX DOCD: CPT | Performed by: FAMILY MEDICINE

## 2021-04-08 PROCEDURE — 3078F DIAST BP <80 MM HG: CPT | Performed by: FAMILY MEDICINE

## 2021-04-08 PROCEDURE — 99214 OFFICE O/P EST MOD 30 MIN: CPT | Performed by: FAMILY MEDICINE

## 2021-04-08 RX ORDER — METHOCARBAMOL 500 MG/1
500 TABLET, FILM COATED ORAL 3 TIMES DAILY PRN
Qty: 30 TABLET | Refills: 1 | Status: SHIPPED | OUTPATIENT
Start: 2021-04-08 | End: 2021-10-20

## 2021-04-08 RX ORDER — FUROSEMIDE 40 MG/1
TABLET ORAL
Qty: 90 TABLET | Refills: 0 | Status: SHIPPED | OUTPATIENT
Start: 2021-04-08 | End: 2021-10-19

## 2021-04-08 RX ORDER — IRBESARTAN 300 MG/1
300 TABLET ORAL DAILY
Qty: 90 TABLET | Refills: 1 | Status: SHIPPED | OUTPATIENT
Start: 2021-04-08 | End: 2022-01-24

## 2021-04-08 RX ORDER — METOPROLOL SUCCINATE 100 MG/1
100 TABLET, EXTENDED RELEASE ORAL DAILY
Qty: 90 TABLET | Refills: 1 | Status: SHIPPED | OUTPATIENT
Start: 2021-04-08

## 2021-04-08 RX ORDER — ESCITALOPRAM OXALATE 20 MG/1
20 TABLET ORAL DAILY
Qty: 90 TABLET | Refills: 1 | Status: SHIPPED | OUTPATIENT
Start: 2021-04-08

## 2021-04-08 RX ORDER — ATORVASTATIN CALCIUM 10 MG/1
10 TABLET, FILM COATED ORAL NIGHTLY
Qty: 30 TABLET | Refills: 5 | Status: SHIPPED | OUTPATIENT
Start: 2021-04-08 | End: 2021-12-07

## 2021-04-08 RX ORDER — METOPROLOL SUCCINATE 100 MG/1
100 TABLET, EXTENDED RELEASE ORAL DAILY
Qty: 30 TABLET | Refills: 0 | Status: SHIPPED | OUTPATIENT
Start: 2021-04-08 | End: 2022-01-31

## 2021-04-08 RX ORDER — FUROSEMIDE 40 MG/1
40 TABLET ORAL DAILY
Qty: 90 TABLET | Refills: 1 | Status: SHIPPED | OUTPATIENT
Start: 2021-04-08 | End: 2022-01-31

## 2021-04-08 NOTE — PROGRESS NOTES
Awa Alston is a 64year old female. HPI:   Pt is here for med ck/follow up. Overall is doing well. Is taking meds as directed. No issues w medications. Is staying healthy given the [place] pandemic.     Did get her vaccines  Last was yesterday     Th Alcohol/week: 6.0 standard drinks      Types: 3 Glasses of wine, 3 Shots of liquor per week      Comment: occ    Drug use: Yes      Comment: Edibles       REVIEW OF SYSTEMS:   GENERAL HEALTH: feels well otherwise  SKIN: denies rashes  RESPIRATORY: denies s

## 2021-04-08 NOTE — TELEPHONE ENCOUNTER
Name from pharmacy: FUROSEMIDE 40MG TABLETS          Will file in chart as: FUROSEMIDE 40 MG Oral Tab    Sig: TAKE 1 TABLET(40 MG) BY MOUTH DAILY    Disp:  90 tablet    Refills:  0 (Pharmacy requested: Not specified)    Start: 4/7/2021    Class: Normal

## 2021-04-08 NOTE — TELEPHONE ENCOUNTER
Future Appointments   Date Time Provider Mic Svetlana   4/8/2021  3:00 PM Erica Kim MD [facility] [place]       Please advise, medication pending.

## 2021-04-09 NOTE — TELEPHONE ENCOUNTER
Name from pharmacy: ESCITALOPRAM 10MG TABLETS          Will file in chart as: ESCITALOPRAM 10 MG Oral Tab    The original prescription was reordered on 4/8/2021 by Kofi George MD. Renewing this prescription may not be appropriate.      Possible duplicate

## 2021-04-13 RX ORDER — ESCITALOPRAM OXALATE 10 MG/1
TABLET ORAL
Qty: 90 TABLET | Refills: 0 | OUTPATIENT
Start: 2021-04-13

## 2021-04-26 ENCOUNTER — TELEPHONE (OUTPATIENT)
Dept: INTERNAL MEDICINE CLINIC | Facility: CLINIC | Age: 61
End: 2021-04-26

## 2021-04-26 ENCOUNTER — OFFICE VISIT (OUTPATIENT)
Dept: INTERNAL MEDICINE CLINIC | Facility: CLINIC | Age: 61
End: 2021-04-26
Payer: COMMERCIAL

## 2021-04-26 VITALS
OXYGEN SATURATION: 98 % | HEART RATE: 65 BPM | SYSTOLIC BLOOD PRESSURE: 130 MMHG | BODY MASS INDEX: 39 KG/M2 | WEIGHT: 250.5 LBS | DIASTOLIC BLOOD PRESSURE: 80 MMHG | RESPIRATION RATE: 16 BRPM | TEMPERATURE: 99 F

## 2021-04-26 DIAGNOSIS — T78.40XA ALLERGIC REACTION, INITIAL ENCOUNTER: ICD-10-CM

## 2021-04-26 DIAGNOSIS — H66.91 ACUTE RIGHT OTITIS MEDIA: Primary | ICD-10-CM

## 2021-04-26 DIAGNOSIS — R59.0 LYMPHADENOPATHY OF RIGHT CERVICAL REGION: ICD-10-CM

## 2021-04-26 PROCEDURE — 3075F SYST BP GE 130 - 139MM HG: CPT | Performed by: FAMILY MEDICINE

## 2021-04-26 PROCEDURE — 3079F DIAST BP 80-89 MM HG: CPT | Performed by: FAMILY MEDICINE

## 2021-04-26 PROCEDURE — 99213 OFFICE O/P EST LOW 20 MIN: CPT | Performed by: FAMILY MEDICINE

## 2021-04-26 RX ORDER — METHYLPREDNISOLONE 4 MG/1
TABLET ORAL
Qty: 1 KIT | Refills: 0 | Status: SHIPPED | OUTPATIENT
Start: 2021-04-26 | End: 2021-07-29 | Stop reason: ALTCHOICE

## 2021-04-26 RX ORDER — AMOXICILLIN AND CLAVULANATE POTASSIUM 875; 125 MG/1; MG/1
1 TABLET, FILM COATED ORAL 2 TIMES DAILY
Qty: 20 TABLET | Refills: 0 | Status: SHIPPED | OUTPATIENT
Start: 2021-04-26 | End: 2021-07-29 | Stop reason: ALTCHOICE

## 2021-04-26 NOTE — TELEPHONE ENCOUNTER
Pt states that 2 days ago she developed R side ST, painful to swallow/eat, R ear/neck pain.      Future Appointments   Date Time Provider Mic Svetlana   4/26/2021  2:00 PM GABBY Nesbitt [facility] [place]

## 2021-04-26 NOTE — PROGRESS NOTES
CHIEF COMPLAINT:     Patient presents with:  Throat Problem: Started over the weekend throat pain, togue swelling, Rt ear pain. Pain range 9/10       HPI:   Leandro Raphael is a 64year old female . The patient complains of right ear pain.  Has had for 4  day • Myocardial infarction [facility]) May, 1, 2012   • Other and unspecified hyperlipidemia    • Stented coronary artery    • Unspecified essential hypertension    • Wears glasses    • Weight gain       Social History:  Social History    Tobacco Use      Smoking RRR without murmur  LYMPH:  Right cervical lymphadenopathy.       Physical Exam    ASSESSMENT AND PLAN:     Acute right otitis media  (primary encounter diagnosis)  Lymphadenopathy of right cervical region  Allergic reaction, initial encounter    No orders

## 2021-05-04 ENCOUNTER — TELEPHONE (OUTPATIENT)
Dept: INTERNAL MEDICINE CLINIC | Facility: CLINIC | Age: 61
End: 2021-05-04

## 2021-05-04 NOTE — TELEPHONE ENCOUNTER
Pt reports no improvement to sxs since visit with SM 4/26. Right sided ear and throat pain. Throat pain/painful to swallow, talk & eat/drink she describes as \"burning and sharp\"   Swelling to face and eyes, more pronounced in the a.m.     Finished

## 2021-05-04 NOTE — TELEPHONE ENCOUNTER
Spoke with patient advised of SM recommendations. Sounds like it could still be allergy related. Lets have Pt try Flonase or Nasacort to help decrease the PND and Allegra since it is non sedating antihistamine.  If not improving, Pt could see an allergist

## 2021-05-04 NOTE — TELEPHONE ENCOUNTER
Sounds like it could still be allergy related. Lets have Pt try Flonase or Nasacort to help decrease the PND and Allegra since it is non sedating antihistamine. If not improving, Pt could see an allergist or ENT.

## 2021-06-15 ENCOUNTER — OFFICE VISIT (OUTPATIENT)
Dept: ORTHOPEDICS CLINIC | Facility: CLINIC | Age: 61
End: 2021-06-15
Payer: COMMERCIAL

## 2021-06-15 DIAGNOSIS — M25.462 EFFUSION OF LEFT KNEE: Primary | ICD-10-CM

## 2021-06-15 PROCEDURE — 99203 OFFICE O/P NEW LOW 30 MIN: CPT | Performed by: ORTHOPAEDIC SURGERY

## 2021-06-15 PROCEDURE — 20610 DRAIN/INJ JOINT/BURSA W/O US: CPT | Performed by: ORTHOPAEDIC SURGERY

## 2021-06-18 RX ORDER — TRIAMCINOLONE ACETONIDE 40 MG/ML
40 INJECTION, SUSPENSION INTRA-ARTICULAR; INTRAMUSCULAR ONCE
Status: COMPLETED | OUTPATIENT
Start: 2021-06-18 | End: 2021-06-21

## 2021-06-18 RX ORDER — KETOROLAC TROMETHAMINE 30 MG/ML
30 INJECTION, SOLUTION INTRAMUSCULAR; INTRAVENOUS ONCE
Status: COMPLETED | OUTPATIENT
Start: 2021-06-18 | End: 2021-06-21

## 2021-06-21 RX ADMIN — TRIAMCINOLONE ACETONIDE 40 MG: 40 INJECTION, SUSPENSION INTRA-ARTICULAR; INTRAMUSCULAR at 15:40:00

## 2021-06-21 RX ADMIN — KETOROLAC TROMETHAMINE 30 MG: 30 INJECTION, SOLUTION INTRAMUSCULAR; INTRAVENOUS at 15:40:00

## 2021-06-21 NOTE — PROCEDURES
Left Knee Intra-articular Injection    Name: Guanaco Garcias   MRN: [id number]  Date: 6/15/2021     Clinical Indications:   Persistent knee pain refractory to conservative measures.      After informed consent, the injection site was marked, sterilized with top

## 2021-06-21 NOTE — H&P
[facility] - ORTHOPEDICS  [facility] [address] [id number]  [phone]     NEW PATIENT VISIT - HISTORY AND PHYSICAL EXAMINATION     Name: Laura Barrow   MRN: [id number]  Date: 6/15/2021     CC: Left Knee Pain 30 tablet 0   • Multiple Vitamins-Minerals (ONE-A-DAY WOMENS 50+ ADVANTAGE) Oral Tab Take 1 tablet by mouth daily. • Multiple Vitamins-Minerals (HAIR/SKIN/NAILS) Oral Tab Take 1 tablet by mouth daily.      • escitalopram 20 MG Oral Tab Take 1 tablet (20 Pulses: Normal pulses. Pulmonary:      Effort: Pulmonary effort is normal. No respiratory distress. Abdominal:      General: There is no distension. Skin:     General: Skin is warm. Capillary Refill: Capillary refill takes less than 2 seconds. is an excellent candidate for intra-articular knee injection and physical therapy to help alleviate the discomfort. PLAN:   We had a detailed discussion outlining the etiology, anatomy, pathophysiology, and natural history of knee pain/swelling.  Imaging

## 2021-07-16 DIAGNOSIS — M25.562 ACUTE PAIN OF LEFT KNEE: ICD-10-CM

## 2021-07-16 NOTE — TELEPHONE ENCOUNTER
LOV: 4/26/2021 with GABBY Acosta  RTC: no follow-up on file  Last Relevant Labs: 8/18/2020 (URIC ACID)  Filled: 4/8/2021   #90 with 1 refill to [facility] #[id number] [place], [place]    No future appointments.

## 2021-07-17 RX ORDER — TRAMADOL HYDROCHLORIDE 50 MG/1
TABLET ORAL
Qty: 30 TABLET | Refills: 0 | OUTPATIENT
Start: 2021-07-17

## 2021-07-18 RX ORDER — IRBESARTAN 300 MG/1
TABLET ORAL
Qty: 90 TABLET | Refills: 1 | OUTPATIENT
Start: 2021-07-18

## 2021-07-29 ENCOUNTER — LAB ENCOUNTER (OUTPATIENT)
Dept: LAB | Age: 61
End: 2021-07-29
Attending: FAMILY MEDICINE
Payer: COMMERCIAL

## 2021-07-29 ENCOUNTER — OFFICE VISIT (OUTPATIENT)
Dept: INTERNAL MEDICINE CLINIC | Facility: CLINIC | Age: 61
End: 2021-07-29
Payer: COMMERCIAL

## 2021-07-29 VITALS
WEIGHT: 252 LBS | DIASTOLIC BLOOD PRESSURE: 85 MMHG | RESPIRATION RATE: 18 BRPM | HEIGHT: 67 IN | BODY MASS INDEX: 39.55 KG/M2 | TEMPERATURE: 99 F | SYSTOLIC BLOOD PRESSURE: 135 MMHG | HEART RATE: 82 BPM

## 2021-07-29 DIAGNOSIS — I10 ESSENTIAL HYPERTENSION, BENIGN: ICD-10-CM

## 2021-07-29 DIAGNOSIS — T14.8XXA MUSCLE TEAR: ICD-10-CM

## 2021-07-29 DIAGNOSIS — L03.031 CELLULITIS OF TOE OF RIGHT FOOT: Primary | ICD-10-CM

## 2021-07-29 DIAGNOSIS — Z12.31 VISIT FOR SCREENING MAMMOGRAM: ICD-10-CM

## 2021-07-29 LAB
ALBUMIN SERPL-MCNC: 4.2 G/DL (ref 3.4–5)
ALBUMIN/GLOB SERPL: 1.2 {RATIO} (ref 1–2)
ALP LIVER SERPL-CCNC: 95 U/L
ALT SERPL-CCNC: 59 U/L
ANION GAP SERPL CALC-SCNC: 1 MMOL/L (ref 0–18)
AST SERPL-CCNC: 33 U/L (ref 15–37)
BILIRUB SERPL-MCNC: 0.5 MG/DL (ref 0.1–2)
BUN BLD-MCNC: 19 MG/DL (ref 7–18)
CALCIUM BLD-MCNC: 9.5 MG/DL (ref 8.5–10.1)
CHLORIDE SERPL-SCNC: 110 MMOL/L (ref 98–112)
CHOLEST SMN-MCNC: 183 MG/DL (ref ?–200)
CO2 SERPL-SCNC: 30 MMOL/L (ref 21–32)
CREAT BLD-MCNC: 1.08 MG/DL
GLOBULIN PLAS-MCNC: 3.4 G/DL (ref 2.8–4.4)
GLUCOSE BLD-MCNC: 100 MG/DL (ref 70–99)
HDLC SERPL-MCNC: 54 MG/DL (ref 40–59)
LDLC SERPL CALC-MCNC: 98 MG/DL (ref ?–100)
M PROTEIN MFR SERPL ELPH: 7.6 G/DL (ref 6.4–8.2)
NONHDLC SERPL-MCNC: 129 MG/DL (ref ?–130)
OSMOLALITY SERPL CALC.SUM OF ELEC: 294 MOSM/KG (ref 275–295)
PATIENT FASTING Y/N/NP: YES
PATIENT FASTING Y/N/NP: YES
POTASSIUM SERPL-SCNC: 4.1 MMOL/L (ref 3.5–5.1)
SODIUM SERPL-SCNC: 141 MMOL/L (ref 136–145)
TRIGL SERPL-MCNC: 179 MG/DL (ref 30–149)
VLDLC SERPL CALC-MCNC: 30 MG/DL (ref 0–30)

## 2021-07-29 PROCEDURE — 3079F DIAST BP 80-89 MM HG: CPT | Performed by: FAMILY MEDICINE

## 2021-07-29 PROCEDURE — 80053 COMPREHEN METABOLIC PANEL: CPT

## 2021-07-29 PROCEDURE — 80061 LIPID PANEL: CPT

## 2021-07-29 PROCEDURE — 3008F BODY MASS INDEX DOCD: CPT | Performed by: FAMILY MEDICINE

## 2021-07-29 PROCEDURE — 99214 OFFICE O/P EST MOD 30 MIN: CPT | Performed by: FAMILY MEDICINE

## 2021-07-29 PROCEDURE — 36415 COLL VENOUS BLD VENIPUNCTURE: CPT

## 2021-07-29 PROCEDURE — 3075F SYST BP GE 130 - 139MM HG: CPT | Performed by: FAMILY MEDICINE

## 2021-07-29 RX ORDER — CEFADROXIL 500 MG/1
500 CAPSULE ORAL 2 TIMES DAILY
Qty: 14 CAPSULE | Refills: 0 | Status: SHIPPED | OUTPATIENT
Start: 2021-07-29 | End: 2021-08-05

## 2021-07-29 NOTE — PROGRESS NOTES
Billy Amaya is a 64year old female.   HPI:   3 weeks gave herself a pedicure    Soaked in epsom salts and the buffed it w stone  Shortly after  Seem to get worse afterwards w pain in the R great toe and some swelling    Had been on amoxil for 1 week from cholesterol    • Myocardial infarction [facility]) May, 1, 2012   • Other and unspecified hyperlipidemia    • Stented coronary artery    • Unspecified essential hypertension    • Wears glasses    • Weight gain       Social History:  Social History    Tobacco Use

## 2021-10-07 ENCOUNTER — TELEPHONE (OUTPATIENT)
Dept: INTERNAL MEDICINE CLINIC | Facility: CLINIC | Age: 61
End: 2021-10-07

## 2021-10-07 DIAGNOSIS — Z23 NEED FOR PNEUMOCOCCAL VACCINE: Primary | ICD-10-CM

## 2021-10-07 NOTE — TELEPHONE ENCOUNTER
Spoke with patient advised not due until 74years of age for Prevnar 15, she will keep appt for flu vaccine. Patient verbalized understanding and agreeable to POC.

## 2021-10-14 ENCOUNTER — NURSE ONLY (OUTPATIENT)
Dept: INTERNAL MEDICINE CLINIC | Facility: CLINIC | Age: 61
End: 2021-10-14
Payer: COMMERCIAL

## 2021-10-14 DIAGNOSIS — Z23 NEED FOR VACCINATION: Primary | ICD-10-CM

## 2021-10-14 PROCEDURE — 90471 IMMUNIZATION ADMIN: CPT | Performed by: INTERNAL MEDICINE

## 2021-10-14 PROCEDURE — 90686 IIV4 VACC NO PRSV 0.5 ML IM: CPT | Performed by: INTERNAL MEDICINE

## 2021-10-19 ENCOUNTER — TELEPHONE (OUTPATIENT)
Dept: INTERNAL MEDICINE CLINIC | Facility: CLINIC | Age: 61
End: 2021-10-19

## 2021-10-19 DIAGNOSIS — M62.830 SPASM OF MUSCLE OF LOWER BACK: ICD-10-CM

## 2021-10-19 RX ORDER — FUROSEMIDE 40 MG/1
40 TABLET ORAL DAILY
Qty: 90 TABLET | Refills: 0 | Status: SHIPPED | OUTPATIENT
Start: 2021-10-19

## 2021-10-19 NOTE — TELEPHONE ENCOUNTER
Pts pharmacy called requesting a refill for : FUROSEMIDE 40 MG Oral Tab        [facility] [address] [place] [address] [address], [phone], [phone]

## 2021-10-19 NOTE — TELEPHONE ENCOUNTER
Pt states she is at her pharmacy waiting for her refill and would like it filled as soon as possible. Informed pts refills take up to 48-72 hours, pt verbalized understanding.

## 2021-10-20 RX ORDER — METHOCARBAMOL 500 MG/1
TABLET, FILM COATED ORAL
Qty: 30 TABLET | Refills: 1 | Status: SHIPPED | OUTPATIENT
Start: 2021-10-20

## 2021-10-20 NOTE — TELEPHONE ENCOUNTER
Name from pharmacy: METHOCARBAMOL 500MG TABLETS          Will file in chart as: METHOCARBAMOL 500 MG Oral Tab    Sig: TAKE 1 TABLET(500 MG) BY MOUTH THREE TIMES DAILY AS NEEDED    Disp:  30 tablet    Refills:  1 (Pharmacy requested: Not specified)    Start

## 2021-11-03 ENCOUNTER — APPOINTMENT (OUTPATIENT)
Dept: CT IMAGING | Age: 61
End: 2021-11-03
Attending: EMERGENCY MEDICINE
Payer: COMMERCIAL

## 2021-11-03 ENCOUNTER — TELEPHONE (OUTPATIENT)
Dept: INTERNAL MEDICINE CLINIC | Facility: CLINIC | Age: 61
End: 2021-11-03

## 2021-11-03 ENCOUNTER — APPOINTMENT (OUTPATIENT)
Dept: GENERAL RADIOLOGY | Age: 61
End: 2021-11-03
Attending: EMERGENCY MEDICINE
Payer: COMMERCIAL

## 2021-11-03 ENCOUNTER — HOSPITAL ENCOUNTER (OUTPATIENT)
Age: 61
Discharge: HOME | End: 2021-11-03
Attending: EMERGENCY MEDICINE
Payer: COMMERCIAL

## 2021-11-03 VITALS
BODY MASS INDEX: 38.45 KG/M2 | TEMPERATURE: 98 F | DIASTOLIC BLOOD PRESSURE: 118 MMHG | HEART RATE: 73 BPM | HEIGHT: 67 IN | RESPIRATION RATE: 18 BRPM | WEIGHT: 245 LBS | SYSTOLIC BLOOD PRESSURE: 154 MMHG | OXYGEN SATURATION: 95 %

## 2021-11-03 DIAGNOSIS — K57.90 DIVERTICULOSIS: ICD-10-CM

## 2021-11-03 DIAGNOSIS — R10.9 ABDOMINAL PAIN OF UNKNOWN ETIOLOGY: ICD-10-CM

## 2021-11-03 DIAGNOSIS — R07.9 CHEST PAIN OF UNCERTAIN ETIOLOGY: Primary | ICD-10-CM

## 2021-11-03 PROCEDURE — 71046 X-RAY EXAM CHEST 2 VIEWS: CPT | Performed by: EMERGENCY MEDICINE

## 2021-11-03 PROCEDURE — 80047 BASIC METABLC PNL IONIZED CA: CPT

## 2021-11-03 PROCEDURE — 85025 COMPLETE CBC W/AUTO DIFF WBC: CPT | Performed by: EMERGENCY MEDICINE

## 2021-11-03 PROCEDURE — 84484 ASSAY OF TROPONIN QUANT: CPT

## 2021-11-03 PROCEDURE — 93010 ELECTROCARDIOGRAM REPORT: CPT

## 2021-11-03 PROCEDURE — 36415 COLL VENOUS BLD VENIPUNCTURE: CPT

## 2021-11-03 PROCEDURE — 99215 OFFICE O/P EST HI 40 MIN: CPT

## 2021-11-03 PROCEDURE — 96372 THER/PROPH/DIAG INJ SC/IM: CPT

## 2021-11-03 PROCEDURE — 83690 ASSAY OF LIPASE: CPT | Performed by: EMERGENCY MEDICINE

## 2021-11-03 PROCEDURE — 80076 HEPATIC FUNCTION PANEL: CPT | Performed by: EMERGENCY MEDICINE

## 2021-11-03 PROCEDURE — 99204 OFFICE O/P NEW MOD 45 MIN: CPT

## 2021-11-03 PROCEDURE — 93005 ELECTROCARDIOGRAM TRACING: CPT

## 2021-11-03 PROCEDURE — 74176 CT ABD & PELVIS W/O CONTRAST: CPT | Performed by: EMERGENCY MEDICINE

## 2021-11-03 PROCEDURE — 81002 URINALYSIS NONAUTO W/O SCOPE: CPT | Performed by: EMERGENCY MEDICINE

## 2021-11-03 RX ORDER — MAGNESIUM HYDROXIDE/ALUMINUM HYDROXICE/SIMETHICONE 120; 1200; 1200 MG/30ML; MG/30ML; MG/30ML
30 SUSPENSION ORAL ONCE
Status: COMPLETED | OUTPATIENT
Start: 2021-11-03 | End: 2021-11-03

## 2021-11-03 RX ORDER — DICYCLOMINE HCL 20 MG
20 TABLET ORAL 3 TIMES DAILY
Qty: 20 TABLET | Refills: 0 | Status: SHIPPED | OUTPATIENT
Start: 2021-11-03

## 2021-11-03 RX ORDER — OMEPRAZOLE 40 MG/1
40 CAPSULE, DELAYED RELEASE ORAL DAILY
Qty: 30 CAPSULE | Refills: 0 | Status: SHIPPED | OUTPATIENT
Start: 2021-11-03 | End: 2021-12-03

## 2021-11-03 RX ORDER — KETOROLAC TROMETHAMINE 30 MG/ML
15 INJECTION, SOLUTION INTRAMUSCULAR; INTRAVENOUS ONCE
Status: DISCONTINUED | OUTPATIENT
Start: 2021-11-03 | End: 2021-11-03

## 2021-11-03 RX ORDER — DICYCLOMINE HCL 20 MG
20 TABLET ORAL ONCE
Status: COMPLETED | OUTPATIENT
Start: 2021-11-03 | End: 2021-11-03

## 2021-11-03 RX ORDER — KETOROLAC TROMETHAMINE 30 MG/ML
30 INJECTION, SOLUTION INTRAMUSCULAR; INTRAVENOUS ONCE
Status: COMPLETED | OUTPATIENT
Start: 2021-11-03 | End: 2021-11-03

## 2021-11-03 NOTE — TELEPHONE ENCOUNTER
Spoke with patient stating had flu shot 3 weeks ago, and continues to have cough, not feeling well but better. Patient also indicates for past 2 weeks has a pain in middle of stomach, like someone punch her, feels sore, patient denies nausea, vomiting, fevers, diarrhea, constipation. Patient also complains of CP, no SOB, or any other symptom. Patient will go to [facility] for further evaluation. FYI to TO.

## 2021-11-03 NOTE — ED PROVIDER NOTES
Patient Seen in: [facility]      History   Patient presents with:  Chest Pain Angina  Abdomen/Flank Pain    Stated Complaint: stomach and chest pain,sob    Subjective:   HPI    Patient is a 29-year-old female comes emergency room for eval Comment: Edibles             Review of Systems    Positive for stated complaint: stomach and chest pain,sob  Other systems are as noted in HPI. Constitutional and vital signs reviewed. All other systems reviewed and negative except as noted above. Creatinine 1.40 (*)     GFR, Non- 41 (*)     GFR, -American 47 (*)     All other components within normal limits   POCT URINALYSIS DIPSTICK - Normal   ISTAT TROPONIN - Normal   LIPASE   HEPATIC FUNCTION PANEL (7)     EKG    Rate, int which includes the Dose Index Registry. PATIENT STATED HISTORY: (As transcribed by Technologist)  Patient complains of abdominal pain left sided, cramping and discomfort. Patient states it radiates up her chest with shortness of breath.     FINDINGS:  Cris Beyer Patient will be placed on Prilosec for potential peptic ulcer gastritis. Recommend follow-up the primary physician. Informed of low-density liver lesion which should be followed up by her primary physician.   Patient with chest pain intermittently for 1 y total) by mouth daily.   Qty: 30 capsule Refills: 0

## 2021-11-03 NOTE — TELEPHONE ENCOUNTER
Patient called office and stated she has been experiencing pain in the stomach, almost like a punching like feeling for the past 2 weeks. Patient also stated she received the flu shot 3 weeks ago and has been feeling ill since then. I advised patient to schedule an appointment, but she refused. Patient wants to know either from Dr. Penelope Hutson or Belinda Alejandro what she should do first. Please advise.  [phone]

## 2021-11-03 NOTE — ED INITIAL ASSESSMENT (HPI)
Had a flu shot 3 weeks ago and then developed flu like symptoms. Then 2 weeks ago, c/o constant LUQ abdominal pain that is getting worse with nausea. Last BM today. Denies fevers/vomiting. Pt has back spasms that radiate to her abdomen.   Also c/o sharp int

## 2021-12-07 RX ORDER — ATORVASTATIN CALCIUM 10 MG/1
TABLET, FILM COATED ORAL
Qty: 30 TABLET | Refills: 5 | Status: SHIPPED | OUTPATIENT
Start: 2021-12-07

## 2021-12-07 NOTE — TELEPHONE ENCOUNTER
Requesting:   Name from pharmacy: ATORVASTATIN 10MG TABLETS          Will file in chart as: ATORVASTATIN 10 MG Oral Tab    Sig: TAKE 1 TABLET(10 MG) BY MOUTH EVERY NIGHT    Disp:  30 tablet    Refills:  5 (Pharmacy requested: Not specified)    Start: 12/7/

## 2022-01-24 RX ORDER — IRBESARTAN 300 MG/1
TABLET ORAL
Qty: 90 TABLET | Refills: 0 | Status: SHIPPED | OUTPATIENT
Start: 2022-01-24

## 2022-01-24 NOTE — TELEPHONE ENCOUNTER
Name from pharmacy: IRBESARTAN 300MG TABLETS          Will file in chart as: IRBESARTAN 300 MG Oral Tab    Sig: TAKE 1 TABLET(300 MG) BY MOUTH DAILY    Disp:  90 tablet    Refills:  1 (Pharmacy requested: Not specified)    Start: 1/24/2022    Class: Normal

## 2022-02-01 ENCOUNTER — OFFICE VISIT (OUTPATIENT)
Dept: INTERNAL MEDICINE CLINIC | Facility: CLINIC | Age: 62
End: 2022-02-01
Payer: COMMERCIAL

## 2022-02-01 VITALS
BODY MASS INDEX: 40.18 KG/M2 | WEIGHT: 256 LBS | SYSTOLIC BLOOD PRESSURE: 160 MMHG | DIASTOLIC BLOOD PRESSURE: 108 MMHG | HEIGHT: 67 IN | TEMPERATURE: 98 F | HEART RATE: 80 BPM | RESPIRATION RATE: 18 BRPM

## 2022-02-01 DIAGNOSIS — Z00.00 LABORATORY EXAMINATION ORDERED AS PART OF A ROUTINE GENERAL MEDICAL EXAMINATION: Primary | ICD-10-CM

## 2022-02-01 DIAGNOSIS — Z23 NEED FOR SHINGLES VACCINE: ICD-10-CM

## 2022-02-01 DIAGNOSIS — L03.031 CELLULITIS OF TOE OF RIGHT FOOT: ICD-10-CM

## 2022-02-01 PROCEDURE — 99213 OFFICE O/P EST LOW 20 MIN: CPT | Performed by: FAMILY MEDICINE

## 2022-02-01 PROCEDURE — 90471 IMMUNIZATION ADMIN: CPT | Performed by: FAMILY MEDICINE

## 2022-02-01 PROCEDURE — 3008F BODY MASS INDEX DOCD: CPT | Performed by: FAMILY MEDICINE

## 2022-02-01 PROCEDURE — 3080F DIAST BP >= 90 MM HG: CPT | Performed by: FAMILY MEDICINE

## 2022-02-01 PROCEDURE — 3077F SYST BP >= 140 MM HG: CPT | Performed by: FAMILY MEDICINE

## 2022-02-01 PROCEDURE — 90750 HZV VACC RECOMBINANT IM: CPT | Performed by: FAMILY MEDICINE

## 2022-02-01 RX ORDER — CEFADROXIL 500 MG/1
500 CAPSULE ORAL 2 TIMES DAILY
Qty: 20 CAPSULE | Refills: 0 | Status: SHIPPED | OUTPATIENT
Start: 2022-02-01 | End: 2022-02-16 | Stop reason: ALTCHOICE

## 2022-02-01 RX ORDER — HYDROCODONE BITARTRATE AND ACETAMINOPHEN 5; 325 MG/1; MG/1
1 TABLET ORAL EVERY 8 HOURS PRN
Qty: 30 TABLET | Refills: 0 | Status: SHIPPED | OUTPATIENT
Start: 2022-02-01 | End: 2022-02-16 | Stop reason: ALTCHOICE

## 2022-02-08 ENCOUNTER — HOSPITAL ENCOUNTER (EMERGENCY)
Facility: HOSPITAL | Age: 62
Discharge: HOME | End: 2022-02-08
Attending: EMERGENCY MEDICINE
Payer: COMMERCIAL

## 2022-02-08 ENCOUNTER — APPOINTMENT (OUTPATIENT)
Dept: GENERAL RADIOLOGY | Facility: HOSPITAL | Age: 62
End: 2022-02-08
Attending: EMERGENCY MEDICINE
Payer: COMMERCIAL

## 2022-02-08 ENCOUNTER — APPOINTMENT (OUTPATIENT)
Dept: ULTRASOUND IMAGING | Facility: HOSPITAL | Age: 62
End: 2022-02-08
Attending: EMERGENCY MEDICINE
Payer: COMMERCIAL

## 2022-02-08 ENCOUNTER — TELEPHONE (OUTPATIENT)
Dept: INTERNAL MEDICINE CLINIC | Facility: CLINIC | Age: 62
End: 2022-02-08

## 2022-02-08 VITALS
BODY MASS INDEX: 31.39 KG/M2 | OXYGEN SATURATION: 95 % | SYSTOLIC BLOOD PRESSURE: 152 MMHG | WEIGHT: 200 LBS | DIASTOLIC BLOOD PRESSURE: 89 MMHG | TEMPERATURE: 97 F | HEIGHT: 67 IN | HEART RATE: 55 BPM | RESPIRATION RATE: 14 BRPM

## 2022-02-08 DIAGNOSIS — L03.031 CELLULITIS OF TOE OF RIGHT FOOT: ICD-10-CM

## 2022-02-08 DIAGNOSIS — L03.031 PARONYCHIA OF GREAT TOE, RIGHT: Primary | ICD-10-CM

## 2022-02-08 LAB
ALBUMIN SERPL-MCNC: 3.9 G/DL (ref 3.4–5)
ALBUMIN/GLOB SERPL: 1 {RATIO} (ref 1–2)
ALP LIVER SERPL-CCNC: 96 U/L
ALT SERPL-CCNC: 48 U/L
ANION GAP SERPL CALC-SCNC: 2 MMOL/L (ref 0–18)
AST SERPL-CCNC: 32 U/L (ref 15–37)
BASOPHILS # BLD AUTO: 0.04 X10(3) UL (ref 0–0.2)
BASOPHILS NFR BLD AUTO: 0.5 %
BILIRUB SERPL-MCNC: 0.4 MG/DL (ref 0.1–2)
BUN BLD-MCNC: 16 MG/DL (ref 7–18)
CALCIUM BLD-MCNC: 9.8 MG/DL (ref 8.5–10.1)
CHLORIDE SERPL-SCNC: 107 MMOL/L (ref 98–112)
CO2 SERPL-SCNC: 29 MMOL/L (ref 21–32)
CREAT BLD-MCNC: 1.17 MG/DL
CRP SERPL-MCNC: 0.67 MG/DL (ref ?–0.3)
EOSINOPHIL # BLD AUTO: 0.18 X10(3) UL (ref 0–0.7)
EOSINOPHIL NFR BLD AUTO: 2.2 %
ERYTHROCYTE [DISTWIDTH] IN BLOOD BY AUTOMATED COUNT: 13.7 %
ERYTHROCYTE [SEDIMENTATION RATE] IN BLOOD: 11 MM/HR
GLOBULIN PLAS-MCNC: 3.9 G/DL (ref 2.8–4.4)
GLUCOSE BLD-MCNC: 155 MG/DL (ref 70–99)
HCT VFR BLD AUTO: 40.1 %
HGB BLD-MCNC: 14.3 G/DL
IMM GRANULOCYTES # BLD AUTO: 0.04 X10(3) UL (ref 0–1)
IMM GRANULOCYTES NFR BLD: 0.5 %
LYMPHOCYTES # BLD AUTO: 3.33 X10(3) UL (ref 1–4)
LYMPHOCYTES NFR BLD AUTO: 40.9 %
MCH RBC QN AUTO: 29.9 PG (ref 26–34)
MCHC RBC AUTO-ENTMCNC: 35.7 G/DL (ref 31–37)
MCV RBC AUTO: 83.7 FL
MONOCYTES # BLD AUTO: 0.66 X10(3) UL (ref 0.1–1)
MONOCYTES NFR BLD AUTO: 8.1 %
NEUTROPHILS # BLD AUTO: 3.9 X10 (3) UL (ref 1.5–7.7)
NEUTROPHILS # BLD AUTO: 3.9 X10(3) UL (ref 1.5–7.7)
NEUTROPHILS NFR BLD AUTO: 47.8 %
PLATELET # BLD AUTO: 263 10(3)UL (ref 150–450)
POTASSIUM SERPL-SCNC: 3.7 MMOL/L (ref 3.5–5.1)
PROT SERPL-MCNC: 7.8 G/DL (ref 6.4–8.2)
RBC # BLD AUTO: 4.79 X10(6)UL
SARS-COV-2 RNA RESP QL NAA+PROBE: NOT DETECTED
SODIUM SERPL-SCNC: 138 MMOL/L (ref 136–145)
WBC # BLD AUTO: 8.2 X10(3) UL (ref 4–11)

## 2022-02-08 PROCEDURE — 64450 NJX AA&/STRD OTHER PN/BRANCH: CPT

## 2022-02-08 PROCEDURE — 73630 X-RAY EXAM OF FOOT: CPT | Performed by: EMERGENCY MEDICINE

## 2022-02-08 PROCEDURE — 80053 COMPREHEN METABOLIC PANEL: CPT | Performed by: EMERGENCY MEDICINE

## 2022-02-08 PROCEDURE — 99285 EMERGENCY DEPT VISIT HI MDM: CPT

## 2022-02-08 PROCEDURE — 86140 C-REACTIVE PROTEIN: CPT | Performed by: EMERGENCY MEDICINE

## 2022-02-08 PROCEDURE — 99284 EMERGENCY DEPT VISIT MOD MDM: CPT

## 2022-02-08 PROCEDURE — 96374 THER/PROPH/DIAG INJ IV PUSH: CPT

## 2022-02-08 PROCEDURE — 85652 RBC SED RATE AUTOMATED: CPT | Performed by: EMERGENCY MEDICINE

## 2022-02-08 PROCEDURE — 93926 LOWER EXTREMITY STUDY: CPT | Performed by: EMERGENCY MEDICINE

## 2022-02-08 PROCEDURE — 85025 COMPLETE CBC W/AUTO DIFF WBC: CPT | Performed by: EMERGENCY MEDICINE

## 2022-02-08 RX ORDER — BUPIVACAINE HYDROCHLORIDE 5 MG/ML
5 INJECTION, SOLUTION EPIDURAL; INTRACAUDAL ONCE
Status: COMPLETED | OUTPATIENT
Start: 2022-02-08 | End: 2022-02-08

## 2022-02-08 RX ORDER — MORPHINE SULFATE 4 MG/ML
4 INJECTION, SOLUTION INTRAMUSCULAR; INTRAVENOUS ONCE
Status: COMPLETED | OUTPATIENT
Start: 2022-02-08 | End: 2022-02-08

## 2022-02-08 RX ORDER — CLINDAMYCIN HYDROCHLORIDE 300 MG/1
300 CAPSULE ORAL 3 TIMES DAILY
Qty: 30 CAPSULE | Refills: 0 | Status: SHIPPED | OUTPATIENT
Start: 2022-02-08 | End: 2022-02-18

## 2022-02-08 RX ORDER — HYDROCODONE BITARTRATE AND ACETAMINOPHEN 5; 325 MG/1; MG/1
1 TABLET ORAL ONCE
Status: COMPLETED | OUTPATIENT
Start: 2022-02-08 | End: 2022-02-08

## 2022-02-08 RX ORDER — BUPIVACAINE HYDROCHLORIDE 5 MG/ML
INJECTION, SOLUTION EPIDURAL; INTRACAUDAL
Status: DISCONTINUED
Start: 2022-02-08 | End: 2022-02-08

## 2022-02-08 NOTE — TELEPHONE ENCOUNTER
Patient notified of recommendations below from Margie. Pt agreeable to ER at this time, states  will transport patient.

## 2022-02-08 NOTE — TELEPHONE ENCOUNTER
Spoke with patient regarding message below. Pt reporting dark redness to skin on right great toe, increased sensitivity, sharp pains, swelling present. Cuticle around big toe is burning   Toe nail is darkened/discoloration present  Soaking in peroxide and water, epsom salt, ointment   Almost completed antibiotic prescribed by Tereza Hoffman during office visit 2/1 with no improvement of symptoms noticed by patient. Patient clipped toe nails again after seeing Tereza Hoffman in office 2/1/22, stating she noticed the area wasn't healing. No drainage, bleeding, fever at this time. Advised patient to discontinue clipping/cutting of toe nails until current issue is addressed by a provider. Tereza Hoffman should patient proceed to UC or would you like to refer to podiatry?  Please advise, TY.

## 2022-02-08 NOTE — TELEPHONE ENCOUNTER
Pt saw SM 02/01/2022. Pt calling bc her toe is not healing properly and she is still in pain. She is almost done with meds that SM prescribed. She states she is not sleeping form the discomfort. Wants to know what SM would like to do.

## 2022-02-08 NOTE — ED INITIAL ASSESSMENT (HPI)
PT here due to right great toe pain. Pt states that she has seen a doctor for this and was given antibiotics but they are not getting it better. PT previously had an issue with her other great toe and she had a MI at that time so she is a little scared.

## 2022-02-13 NOTE — PATIENT INSTRUCTIONS
-Follow-up with Dr. Penny Zimmerman for ischemic changes to right hallux.  -Take antibiotics as prescribed.  -Wear surgical shoe to right foot.

## 2022-02-14 VITALS — WEIGHT: 200 LBS | HEIGHT: 67 IN | BODY MASS INDEX: 31.39 KG/M2

## 2022-02-15 ENCOUNTER — HOSPITAL ENCOUNTER (OUTPATIENT)
Dept: INTERVENTIONAL RADIOLOGY/VASCULAR | Facility: HOSPITAL | Age: 62
Discharge: HOME | End: 2022-02-15
Attending: INTERNAL MEDICINE
Payer: COMMERCIAL

## 2022-02-17 ENCOUNTER — HOSPITAL ENCOUNTER (OUTPATIENT)
Dept: INTERVENTIONAL RADIOLOGY/VASCULAR | Facility: HOSPITAL | Age: 62
Discharge: HOME | End: 2022-02-18
Attending: INTERNAL MEDICINE | Admitting: INTERNAL MEDICINE
Payer: COMMERCIAL

## 2022-02-17 DIAGNOSIS — I73.9 PAD (PERIPHERAL ARTERY DISEASE) (HCC): ICD-10-CM

## 2022-02-17 LAB — SARS-COV-2 RNA RESP QL NAA+PROBE: NOT DETECTED

## 2022-02-17 PROCEDURE — 047P3ZZ DILATION OF RIGHT ANTERIOR TIBIAL ARTERY, PERCUTANEOUS APPROACH: ICD-10-PCS | Performed by: INTERNAL MEDICINE

## 2022-02-17 PROCEDURE — 047K3ZZ DILATION OF RIGHT FEMORAL ARTERY, PERCUTANEOUS APPROACH: ICD-10-PCS | Performed by: INTERNAL MEDICINE

## 2022-02-17 PROCEDURE — 04CK3ZZ EXTIRPATION OF MATTER FROM RIGHT FEMORAL ARTERY, PERCUTANEOUS APPROACH: ICD-10-PCS | Performed by: INTERNAL MEDICINE

## 2022-02-17 PROCEDURE — B41FYZZ FLUOROSCOPY OF RIGHT LOWER EXTREMITY ARTERIES USING OTHER CONTRAST: ICD-10-PCS | Performed by: INTERNAL MEDICINE

## 2022-02-17 PROCEDURE — 99214 OFFICE O/P EST MOD 30 MIN: CPT | Performed by: STUDENT IN AN ORGANIZED HEALTH CARE EDUCATION/TRAINING PROGRAM

## 2022-02-17 RX ORDER — SODIUM CHLORIDE 9 MG/ML
INJECTION, SOLUTION INTRAVENOUS CONTINUOUS
Status: ACTIVE | OUTPATIENT
Start: 2022-02-17 | End: 2022-02-17

## 2022-02-17 RX ORDER — METHOCARBAMOL 500 MG/1
500 TABLET, FILM COATED ORAL 3 TIMES DAILY PRN
Status: DISCONTINUED | OUTPATIENT
Start: 2022-02-17 | End: 2022-02-18

## 2022-02-17 RX ORDER — ASPIRIN 81 MG/1
81 TABLET ORAL DAILY
Status: DISCONTINUED | OUTPATIENT
Start: 2022-02-18 | End: 2022-02-18

## 2022-02-17 RX ORDER — METOPROLOL SUCCINATE 100 MG/1
100 TABLET, EXTENDED RELEASE ORAL
Status: DISCONTINUED | OUTPATIENT
Start: 2022-02-18 | End: 2022-02-18

## 2022-02-17 RX ORDER — HYDROCODONE BITARTRATE AND ACETAMINOPHEN 5; 325 MG/1; MG/1
2 TABLET ORAL EVERY 4 HOURS PRN
Status: DISCONTINUED | OUTPATIENT
Start: 2022-02-17 | End: 2022-02-18

## 2022-02-17 RX ORDER — CLOPIDOGREL BISULFATE 75 MG/1
TABLET ORAL
Status: COMPLETED
Start: 2022-02-17 | End: 2022-02-17

## 2022-02-17 RX ORDER — ACETAMINOPHEN 325 MG/1
650 TABLET ORAL EVERY 6 HOURS PRN
Status: DISCONTINUED | OUTPATIENT
Start: 2022-02-17 | End: 2022-02-18

## 2022-02-17 RX ORDER — DIPHENHYDRAMINE HYDROCHLORIDE 50 MG/ML
INJECTION INTRAMUSCULAR; INTRAVENOUS
Status: COMPLETED
Start: 2022-02-17 | End: 2022-02-17

## 2022-02-17 RX ORDER — HEPARIN SODIUM 5000 [USP'U]/ML
INJECTION, SOLUTION INTRAVENOUS; SUBCUTANEOUS
Status: COMPLETED
Start: 2022-02-17 | End: 2022-02-17

## 2022-02-17 RX ORDER — LIDOCAINE HYDROCHLORIDE 10 MG/ML
INJECTION, SOLUTION EPIDURAL; INFILTRATION; INTRACAUDAL; PERINEURAL
Status: COMPLETED
Start: 2022-02-17 | End: 2022-02-17

## 2022-02-17 RX ORDER — MORPHINE SULFATE 2 MG/ML
2 INJECTION, SOLUTION INTRAMUSCULAR; INTRAVENOUS ONCE
Status: COMPLETED | OUTPATIENT
Start: 2022-02-17 | End: 2022-02-17

## 2022-02-17 RX ORDER — ESCITALOPRAM OXALATE 20 MG/1
20 TABLET ORAL DAILY
Status: DISCONTINUED | OUTPATIENT
Start: 2022-02-17 | End: 2022-02-17

## 2022-02-17 RX ORDER — SODIUM CHLORIDE 9 MG/ML
INJECTION, SOLUTION INTRAVENOUS CONTINUOUS
Status: DISCONTINUED | OUTPATIENT
Start: 2022-02-17 | End: 2022-02-18

## 2022-02-17 RX ORDER — HYDROCODONE BITARTRATE AND ACETAMINOPHEN 5; 325 MG/1; MG/1
1 TABLET ORAL EVERY 4 HOURS PRN
Status: DISCONTINUED | OUTPATIENT
Start: 2022-02-17 | End: 2022-02-18

## 2022-02-17 RX ORDER — LOSARTAN POTASSIUM 100 MG/1
100 TABLET ORAL DAILY
Refills: 0 | Status: DISCONTINUED | OUTPATIENT
Start: 2022-02-18 | End: 2022-02-18

## 2022-02-17 RX ORDER — MIDAZOLAM HYDROCHLORIDE 1 MG/ML
INJECTION INTRAMUSCULAR; INTRAVENOUS
Status: COMPLETED
Start: 2022-02-17 | End: 2022-02-17

## 2022-02-17 RX ORDER — CLOPIDOGREL BISULFATE 75 MG/1
75 TABLET ORAL DAILY
Status: DISCONTINUED | OUTPATIENT
Start: 2022-02-18 | End: 2022-02-18

## 2022-02-17 RX ADMIN — HYDROCODONE BITARTRATE AND ACETAMINOPHEN 1 TABLET: 5; 325 TABLET ORAL at 23:59:00

## 2022-02-17 RX ADMIN — HYDROCODONE BITARTRATE AND ACETAMINOPHEN 2 TABLET: 5; 325 TABLET ORAL at 20:06:00

## 2022-02-17 RX ADMIN — SODIUM CHLORIDE: 9 INJECTION, SOLUTION INTRAVENOUS at 09:30:00

## 2022-02-17 RX ADMIN — SODIUM CHLORIDE: 9 INJECTION, SOLUTION INTRAVENOUS at 16:29:00

## 2022-02-17 RX ADMIN — ACETAMINOPHEN 650 MG: 325 TABLET ORAL at 21:07:00

## 2022-02-17 RX ADMIN — MORPHINE SULFATE 2 MG: 2 INJECTION, SOLUTION INTRAMUSCULAR; INTRAVENOUS at 16:29:00

## 2022-02-17 NOTE — BRIEF OP NOTE
Pre-Operative Diagnosis: * No pre-op diagnosis entered *     Post-Operative Diagnosis: * No post-op diagnosis entered *      Procedure Performed:       * No surgeons found in log *    Assistant(s):        Surgical Findings: 80% stenosis of the RT SFA s/p Ballon angioplasty and CSI atherectomy   S/p Ballon angioplasty of the 100% occluded Anterior tibial artery      Specimen: None      Estimated Blood Loss: No data recorded    Dictation Number:      Joseph Duque MD  2/17/2022  1:03 PM

## 2022-02-17 NOTE — DIETARY NOTE
Clinical Nutrition     Dietitian consult received per cardiac rehab standing order. Pt to be educated by cardiac rehab staff and encouraged to attend outpatient classes taught by RD. RD available PRN.     Deepthi Sherman RD, LDN  Clinical Dietitian  Phone [phone]  Pager [phone]

## 2022-02-18 VITALS
WEIGHT: 235 LBS | HEART RATE: 59 BPM | RESPIRATION RATE: 18 BRPM | TEMPERATURE: 98 F | OXYGEN SATURATION: 94 % | DIASTOLIC BLOOD PRESSURE: 76 MMHG | HEIGHT: 67 IN | SYSTOLIC BLOOD PRESSURE: 148 MMHG | BODY MASS INDEX: 36.88 KG/M2

## 2022-02-18 LAB
ANION GAP SERPL CALC-SCNC: 3 MMOL/L (ref 0–18)
BUN BLD-MCNC: 17 MG/DL (ref 7–18)
CALCIUM BLD-MCNC: 8.8 MG/DL (ref 8.5–10.1)
CO2 SERPL-SCNC: 27 MMOL/L (ref 21–32)
CREAT BLD-MCNC: 0.98 MG/DL
ERYTHROCYTE [DISTWIDTH] IN BLOOD BY AUTOMATED COUNT: 13.7 %
GLUCOSE BLD-MCNC: 106 MG/DL (ref 70–99)
HCT VFR BLD AUTO: 36.3 %
HGB BLD-MCNC: 12.6 G/DL
ISTAT ACTIVATED CLOTTING TIME: 178 SECONDS (ref 74–137)
ISTAT ACTIVATED CLOTTING TIME: 214 SECONDS (ref 74–137)
MCH RBC QN AUTO: 30.1 PG (ref 26–34)
MCHC RBC AUTO-ENTMCNC: 34.7 G/DL (ref 31–37)
MCV RBC AUTO: 86.8 FL
OSMOLALITY SERPL CALC.SUM OF ELEC: 290 MOSM/KG (ref 275–295)
PLATELET # BLD AUTO: 224 10(3)UL (ref 150–450)
POTASSIUM SERPL-SCNC: 4.6 MMOL/L (ref 3.5–5.1)
RBC # BLD AUTO: 4.18 X10(6)UL
SODIUM SERPL-SCNC: 139 MMOL/L (ref 136–145)
WBC # BLD AUTO: 8.1 X10(3) UL (ref 4–11)

## 2022-02-18 RX ORDER — CLOPIDOGREL BISULFATE 75 MG/1
75 TABLET ORAL DAILY
Qty: 30 TABLET | Refills: 2 | Status: SHIPPED | OUTPATIENT
Start: 2022-02-18

## 2022-02-18 RX ORDER — ACETAMINOPHEN 325 MG/1
650 TABLET ORAL EVERY 6 HOURS PRN
Qty: 60 TABLET | Refills: 0 | Status: SHIPPED | OUTPATIENT
Start: 2022-02-18

## 2022-02-18 RX ORDER — ASPIRIN 81 MG/1
81 TABLET ORAL DAILY
Qty: 30 TABLET | Refills: 2 | Status: SHIPPED | OUTPATIENT
Start: 2022-02-18

## 2022-02-18 RX ORDER — IBUPROFEN 600 MG/1
600 TABLET ORAL EVERY 8 HOURS PRN
Qty: 60 TABLET | Refills: 0 | Status: SHIPPED | OUTPATIENT
Start: 2022-02-18

## 2022-02-18 RX ADMIN — SODIUM CHLORIDE: 9 INJECTION, SOLUTION INTRAVENOUS at 05:00:00

## 2022-02-18 RX ADMIN — HYDROCODONE BITARTRATE AND ACETAMINOPHEN 2 TABLET: 5; 325 TABLET ORAL at 05:28:00

## 2022-02-18 RX ADMIN — CLOPIDOGREL BISULFATE 75 MG: 75 TABLET ORAL at 09:26:00

## 2022-02-18 RX ADMIN — ASPIRIN 81 MG: 81 TABLET ORAL at 09:24:00

## 2022-02-18 RX ADMIN — HYDROCODONE BITARTRATE AND ACETAMINOPHEN 2 TABLET: 5; 325 TABLET ORAL at 09:24:00

## 2022-02-18 RX ADMIN — LOSARTAN POTASSIUM 100 MG: 100 TABLET ORAL at 09:24:00

## 2022-02-18 RX ADMIN — METOPROLOL SUCCINATE 100 MG: 100 TABLET, EXTENDED RELEASE ORAL at 05:28:00

## 2022-02-18 NOTE — PLAN OF CARE
NURSING DISCHARGE NOTE    Discharged Home via Wheelchair. Accompanied by Support staff  Belongings Taken by patient/family. Discharge AVS complete and reviewed with patient. Patient medically cleared to discharge per all consults. Discharge orders and instructions given. New medications, side effects, and follow up appointments discussed. Patient instructed on when to call doctor. Patient verbalized understanding. All questions answered to patient's satisfaction. Patient discharged home with spouse.

## 2022-02-18 NOTE — PLAN OF CARE
Assumed care at [facility] x4, lethargic, RA, NSR/SB  Complaint of right leg pain; Norco given     Left groin site; hard, hematoma present and old bloody drainage on dressing-did not increased throughout shift (marked with pen before shift)   BL pedal/tibial doppler  Abdomen soft bilaterally   Vitals stable     Voided, ambulated, up x1 standby    Plan for continual observation of left groin site, pain management, possible DC today Patent

## 2022-02-18 NOTE — PROCEDURES
[facility]    PATIENT'S NAME: Licha Rachel   ATTENDING PHYSICIAN: Heriberto Elaine MD   OPERATING PHYSICIAN: Heriberto Elaine MD   PATIENT ACCOUNT#:   [de-identified]    LOCATION:  [address] [place] [address]  MEDICAL RECORD #:   [id number]       YOB: 1960  ADMISSION DATE:       02/17/2022      OPERATION DATE:  02/17/2022    CARDIAC PROCEDURE TRANSCRIPTION    PERIPHERAL ANGIOGRAPHY/PERCUTANEOUS PERIPHERAL INTERVENTION    PREOPERATIVE DIAGNOSIS:  POSTOPERATIVE DIAGNOSIS:  PROCEDURE PERFORMED:  1. Moderate sedation. 2.   Left common femoral artery access under ultrasound guidance. 3.   Abdominal aortic angiogram with runoffs over the femoral heads. 4.   Selective right lower extremity angiogram with the tip of the catheter in right common femoral artery and right popliteal artery. 5.   Status post CSI atherectomy of 80% blockage of the mid SFA and balloon angioplasty using a 5 x 120 Chocolate balloon. 6.   Status post balloon angioplasty of 100% occluded anterior tibial artery. INDICATION FOR PROCEDURE:  Gangrenous changes in the toe with abnormal GURPREET on arterial ultrasound. DESCRIPTION OF PROCEDURE:  Patient was brought to the cardiac catheterization laboratory in a fasting, nonsedated state, prepped and draped in the usual sterile fashion. Lidocaine 2% was used in the left groin area as local anesthesia. Using ultrasound, the left common femoral artery was visualized. It was patent. The left common femoral artery was cannulated. A 5-Beninese 11 cm sheath was placed. Then, using a crossover catheter, abdominal aortic angiogram with runoffs over the femoral heads was performed. Then, we went to the contralateral side using a crossover catheter, placed the tip of the crossover catheter in the common femoral artery, and selective right lower extremity angiogram was performed.   We then placed the tip of the catheter in the distal SFA and selective right lower extremity angiogram below the knee was performed. There was 80% stenosis, calcified lesion in the mid SFA and 100% occluded anterior tibial artery on the right side. The peroneal and posterior tibial arteries were patent all the way down to the foot. We crossed the lesion using a Gladius 0.018 wire all the way down, right above the ankle in the AT. At the foot, the AT appeared to be completely occluded. We did CSI atherectomy using a 2.0 bur in the mid SFA and balloon angioplastied it using a 5 x 120 Chocolate balloon. We then balloon angioplastied the anterior tibial artery using a 2.5 x 150 balloon. Post balloon angioplasty angiogram showed less than 10% residual stenosis in the SFA. The AT has slow flow, but it is open all the way down to the ankle and the foot receives blood flow from collaterals. RESULTS:  1. Abdominal aorta is patent. 2.   Bilateral common iliac is patent. 3.   Bilateral external iliac is patent. 4.   Bilateral common femoral artery is patent. 5.   Right SFA 80% stenosis, calcified mid SFA, status post CSI atherectomy and balloon angioplasty using a Chocolate balloon. 6.   Popliteal on the right side is patent. 7.   Below knee, there is 2 good vessel runoff via the peroneal and posterior tibial.  The AT is completely occluded at the foot. We opened it up all the way up to the ankle. PLAN:  We will keep the patient overnight. Patient has severe right toe pain. We will consult Podiatry, Dr. Luz Marina Becerra, to evaluate patient.     Dictated By Irene Ferrera MD  d: 02/17/2022 13:48:08  t: 02/17/2022 14:34:39  [facility] [id number]  /

## 2022-02-18 NOTE — PLAN OF CARE
NURSING ADMISSION NOTE      Patient admitted via Cart  Oriented to room. Safety precautions initiated. Bed in low position. Call light in reach. Admitted to floor around 1315. Admission navigator complete  Sheath removed; manual pressure held x20 min. Quick clot and Tegaderm applied  Pedal pulses per doppler  C/o \"severe\" R foot pain; morphine given with relief  Podiatry notified of new consult  Good appetite  Up to bathroom with standby assist  Plan of care discussed with patient and physicians.

## 2022-02-19 NOTE — PAYOR COMM NOTE
--------------  DISCHARGE REVIEW    Payor: [address] #:  [id number]  Authorization Number: [id number]    Admit date: N/A  Admit time:  N/A  Discharge Date: 2/18/2022 12:42 PM     Admitting Physician: Marilu Recinos MD  Primary Care Physician: Valentina Baptiste MD      REVIEWER COMMENTS    FOR FINAL REVIEW/APPROVAL OF ALL OPIB DAYS

## 2022-02-27 RX ORDER — FUROSEMIDE 40 MG/1
TABLET ORAL
Qty: 90 TABLET | Refills: 0 | Status: SHIPPED | OUTPATIENT
Start: 2022-02-27

## 2022-02-28 RX ORDER — METOPROLOL SUCCINATE 100 MG/1
TABLET, EXTENDED RELEASE ORAL
Qty: 90 TABLET | Refills: 0 | Status: SHIPPED | OUTPATIENT
Start: 2022-02-28

## 2022-02-28 NOTE — PATIENT INSTRUCTIONS
-Continue wearing surgical shoe to right foot.  -Discussed importance of smoking cessation.  -Continue following with vascular surgery.

## 2022-03-07 ENCOUNTER — OFFICE VISIT (OUTPATIENT)
Dept: INTERNAL MEDICINE CLINIC | Facility: CLINIC | Age: 62
End: 2022-03-07
Payer: COMMERCIAL

## 2022-03-07 VITALS
SYSTOLIC BLOOD PRESSURE: 135 MMHG | DIASTOLIC BLOOD PRESSURE: 82 MMHG | HEIGHT: 67 IN | TEMPERATURE: 98 F | HEART RATE: 84 BPM | RESPIRATION RATE: 20 BRPM | WEIGHT: 254.38 LBS | OXYGEN SATURATION: 95 % | BODY MASS INDEX: 39.92 KG/M2

## 2022-03-07 DIAGNOSIS — I73.9 PAD (PERIPHERAL ARTERY DISEASE) (HCC): ICD-10-CM

## 2022-03-07 DIAGNOSIS — I10 ESSENTIAL HYPERTENSION, BENIGN: Primary | ICD-10-CM

## 2022-03-07 DIAGNOSIS — F17.200 TOBACCO USE DISORDER: ICD-10-CM

## 2022-03-07 DIAGNOSIS — E66.9 OBESITY (BMI 30-39.9): ICD-10-CM

## 2022-03-07 PROCEDURE — 3079F DIAST BP 80-89 MM HG: CPT | Performed by: FAMILY MEDICINE

## 2022-03-07 PROCEDURE — 99214 OFFICE O/P EST MOD 30 MIN: CPT | Performed by: FAMILY MEDICINE

## 2022-03-07 PROCEDURE — 3008F BODY MASS INDEX DOCD: CPT | Performed by: FAMILY MEDICINE

## 2022-03-07 PROCEDURE — 3075F SYST BP GE 130 - 139MM HG: CPT | Performed by: FAMILY MEDICINE

## 2022-03-07 RX ORDER — BUPROPION HYDROCHLORIDE 150 MG/1
150 TABLET, EXTENDED RELEASE ORAL 2 TIMES DAILY
Qty: 60 TABLET | Refills: 2 | Status: SHIPPED | OUTPATIENT
Start: 2022-03-07

## 2022-03-16 ENCOUNTER — OFFICE VISIT (OUTPATIENT)
Dept: PODIATRY CLINIC | Facility: CLINIC | Age: 62
End: 2022-03-16
Payer: COMMERCIAL

## 2022-03-16 DIAGNOSIS — I96 GANGRENE (HCC): Primary | ICD-10-CM

## 2022-03-16 DIAGNOSIS — F17.200 TOBACCO USE DISORDER: ICD-10-CM

## 2022-03-16 PROCEDURE — 99213 OFFICE O/P EST LOW 20 MIN: CPT | Performed by: STUDENT IN AN ORGANIZED HEALTH CARE EDUCATION/TRAINING PROGRAM

## 2022-03-16 NOTE — PATIENT INSTRUCTIONS
-Okay to resume normal shoe gear.  -Continue cessation of smoking.  -Continue following with Dr. Amauri Jennings.

## 2022-05-23 RX ORDER — IRBESARTAN 300 MG/1
TABLET ORAL
Qty: 90 TABLET | Refills: 0 | Status: SHIPPED | OUTPATIENT
Start: 2022-05-23

## 2022-06-15 DIAGNOSIS — M62.830 SPASM OF MUSCLE OF LOWER BACK: ICD-10-CM

## 2022-06-15 RX ORDER — METHOCARBAMOL 500 MG/1
TABLET, FILM COATED ORAL
Qty: 30 TABLET | Refills: 1 | Status: SHIPPED | OUTPATIENT
Start: 2022-06-15

## 2022-07-20 RX ORDER — FUROSEMIDE 40 MG/1
40 TABLET ORAL DAILY
Qty: 90 TABLET | Refills: 0 | Status: SHIPPED | OUTPATIENT
Start: 2022-07-20

## 2022-07-20 RX ORDER — BUPROPION HYDROCHLORIDE 150 MG/1
150 TABLET, EXTENDED RELEASE ORAL 2 TIMES DAILY
Qty: 180 TABLET | Refills: 0 | Status: SHIPPED | OUTPATIENT
Start: 2022-07-20

## 2022-07-20 RX ORDER — METOPROLOL SUCCINATE 100 MG/1
100 TABLET, EXTENDED RELEASE ORAL DAILY
Qty: 90 TABLET | Refills: 0 | Status: SHIPPED | OUTPATIENT
Start: 2022-07-20

## 2022-08-04 DIAGNOSIS — M62.830 SPASM OF MUSCLE OF LOWER BACK: ICD-10-CM

## 2022-08-04 RX ORDER — METHOCARBAMOL 500 MG/1
500 TABLET, FILM COATED ORAL 3 TIMES DAILY PRN
Qty: 30 TABLET | Refills: 1 | Status: SHIPPED | OUTPATIENT
Start: 2022-08-04 | End: 2022-10-05

## 2022-08-04 RX ORDER — CLOPIDOGREL BISULFATE 75 MG/1
75 TABLET ORAL DAILY
Qty: 30 TABLET | Refills: 2 | Status: SHIPPED | OUTPATIENT
Start: 2022-08-04 | End: 2022-12-14

## 2022-08-04 RX ORDER — IRBESARTAN 300 MG/1
300 TABLET ORAL DAILY
Qty: 90 TABLET | Refills: 0 | Status: SHIPPED | OUTPATIENT
Start: 2022-08-04 | End: 2022-10-10

## 2022-08-04 RX ORDER — ATORVASTATIN CALCIUM 10 MG/1
10 TABLET, FILM COATED ORAL NIGHTLY
Qty: 30 TABLET | Refills: 5 | Status: SHIPPED | OUTPATIENT
Start: 2022-08-04 | End: 2022-10-05

## 2022-08-04 NOTE — TELEPHONE ENCOUNTER
Pt requesting refill     METHOCARBAMOL 500 MG Oral Tab    IRBESARTAN 300 MG Oral Tab    clopidogrel 75 MG Oral Tab    ATORVASTATIN 10 MG Oral Tab    Pt was given Clopidogrel at the hospital but would like to know if she still needs to take it.      Please advise    [facility] ([facility]) - [place] [phone], [phone]

## 2022-09-21 ENCOUNTER — TELEPHONE (OUTPATIENT)
Dept: INTERNAL MEDICINE CLINIC | Facility: CLINIC | Age: 62
End: 2022-09-21

## 2022-09-21 DIAGNOSIS — Z12.31 VISIT FOR SCREENING MAMMOGRAM: Primary | ICD-10-CM

## 2022-09-21 NOTE — TELEPHONE ENCOUNTER
Pt is requesting a mammogram order and to ask dr. Chani Govea if he would like to add any orders for labs from the ones daryl LEE ordered, pt is coming for a physical and wants to do the labs and mammogram prior to the appointment. Please call pt once orders are ready.

## 2022-09-21 NOTE — TELEPHONE ENCOUNTER
Dr. Rodrigo Bull,  Mammogram ordered placed. Labs Lytle ordered back on 2/1/2022 include: Vit. D, TSH W reflex, A1c, Lipid, CMP, and CBC. Are there any additional labs you want pt to do?? Please route back to MA basket so that we can notify pt.   Thank you

## 2022-10-01 ENCOUNTER — LAB ENCOUNTER (OUTPATIENT)
Dept: LAB | Age: 62
End: 2022-10-01
Attending: FAMILY MEDICINE
Payer: COMMERCIAL

## 2022-10-01 ENCOUNTER — HOSPITAL ENCOUNTER (OUTPATIENT)
Dept: MAMMOGRAPHY | Age: 62
Discharge: HOME | End: 2022-10-01
Attending: FAMILY MEDICINE
Payer: COMMERCIAL

## 2022-10-01 DIAGNOSIS — Z00.00 LABORATORY EXAMINATION ORDERED AS PART OF A ROUTINE GENERAL MEDICAL EXAMINATION: ICD-10-CM

## 2022-10-01 DIAGNOSIS — Z12.31 VISIT FOR SCREENING MAMMOGRAM: ICD-10-CM

## 2022-10-01 LAB
ALBUMIN SERPL-MCNC: 4 G/DL (ref 3.4–5)
ALBUMIN/GLOB SERPL: 1.1 {RATIO} (ref 1–2)
ALP LIVER SERPL-CCNC: 90 U/L
ALT SERPL-CCNC: 37 U/L
ANION GAP SERPL CALC-SCNC: 10 MMOL/L (ref 0–18)
AST SERPL-CCNC: 22 U/L (ref 15–37)
BASOPHILS # BLD AUTO: 0.05 X10(3) UL (ref 0–0.2)
BASOPHILS NFR BLD AUTO: 0.6 %
BILIRUB SERPL-MCNC: 0.7 MG/DL (ref 0.1–2)
BUN BLD-MCNC: 22 MG/DL (ref 7–18)
CALCIUM BLD-MCNC: 9.9 MG/DL (ref 8.5–10.1)
CHLORIDE SERPL-SCNC: 107 MMOL/L (ref 98–112)
CHOLEST SERPL-MCNC: 191 MG/DL (ref ?–200)
CO2 SERPL-SCNC: 24 MMOL/L (ref 21–32)
CREAT BLD-MCNC: 1.22 MG/DL
EOSINOPHIL # BLD AUTO: 0.15 X10(3) UL (ref 0–0.7)
EOSINOPHIL NFR BLD AUTO: 1.9 %
ERYTHROCYTE [DISTWIDTH] IN BLOOD BY AUTOMATED COUNT: 13.8 %
EST. AVERAGE GLUCOSE BLD GHB EST-MCNC: 148 MG/DL (ref 68–126)
FASTING PATIENT LIPID ANSWER: YES
FASTING STATUS PATIENT QL REPORTED: YES
GFR SERPLBLD BASED ON 1.73 SQ M-ARVRAT: 51 ML/MIN/1.73M2 (ref 60–?)
GLOBULIN PLAS-MCNC: 3.7 G/DL (ref 2.8–4.4)
GLUCOSE BLD-MCNC: 116 MG/DL (ref 70–99)
HBA1C MFR BLD: 6.8 % (ref ?–5.7)
HCT VFR BLD AUTO: 41.3 %
HDLC SERPL-MCNC: 68 MG/DL (ref 40–59)
HGB BLD-MCNC: 13.9 G/DL
IMM GRANULOCYTES # BLD AUTO: 0.02 X10(3) UL (ref 0–1)
IMM GRANULOCYTES NFR BLD: 0.3 %
LDLC SERPL CALC-MCNC: 108 MG/DL (ref ?–100)
LYMPHOCYTES # BLD AUTO: 3.28 X10(3) UL (ref 1–4)
LYMPHOCYTES NFR BLD AUTO: 41 %
MCH RBC QN AUTO: 29.4 PG (ref 26–34)
MCHC RBC AUTO-ENTMCNC: 33.7 G/DL (ref 31–37)
MCV RBC AUTO: 87.3 FL
MONOCYTES # BLD AUTO: 0.55 X10(3) UL (ref 0.1–1)
MONOCYTES NFR BLD AUTO: 6.9 %
NEUTROPHILS # BLD AUTO: 3.95 X10 (3) UL (ref 1.5–7.7)
NEUTROPHILS # BLD AUTO: 3.95 X10(3) UL (ref 1.5–7.7)
NEUTROPHILS NFR BLD AUTO: 49.3 %
NONHDLC SERPL-MCNC: 123 MG/DL (ref ?–130)
OSMOLALITY SERPL CALC.SUM OF ELEC: 296 MOSM/KG (ref 275–295)
PLATELET # BLD AUTO: 287 10(3)UL (ref 150–450)
POTASSIUM SERPL-SCNC: 3.9 MMOL/L (ref 3.5–5.1)
PROT SERPL-MCNC: 7.7 G/DL (ref 6.4–8.2)
RBC # BLD AUTO: 4.73 X10(6)UL
SODIUM SERPL-SCNC: 141 MMOL/L (ref 136–145)
TRIGL SERPL-MCNC: 85 MG/DL (ref 30–149)
TSI SER-ACNC: 2.51 MIU/ML (ref 0.36–3.74)
VIT D+METAB SERPL-MCNC: 30.8 NG/ML (ref 30–100)
VLDLC SERPL CALC-MCNC: 14 MG/DL (ref 0–30)
WBC # BLD AUTO: 8 X10(3) UL (ref 4–11)

## 2022-10-01 PROCEDURE — 77067 SCR MAMMO BI INCL CAD: CPT | Performed by: FAMILY MEDICINE

## 2022-10-01 PROCEDURE — 77063 BREAST TOMOSYNTHESIS BI: CPT | Performed by: FAMILY MEDICINE

## 2022-10-01 PROCEDURE — 83036 HEMOGLOBIN GLYCOSYLATED A1C: CPT

## 2022-10-01 PROCEDURE — 36415 COLL VENOUS BLD VENIPUNCTURE: CPT

## 2022-10-01 PROCEDURE — 80061 LIPID PANEL: CPT

## 2022-10-01 PROCEDURE — 82306 VITAMIN D 25 HYDROXY: CPT

## 2022-10-01 PROCEDURE — 84443 ASSAY THYROID STIM HORMONE: CPT

## 2022-10-01 PROCEDURE — 85025 COMPLETE CBC W/AUTO DIFF WBC: CPT

## 2022-10-01 PROCEDURE — 80053 COMPREHEN METABOLIC PANEL: CPT

## 2022-10-04 ENCOUNTER — TELEPHONE (OUTPATIENT)
Dept: INTERNAL MEDICINE CLINIC | Facility: CLINIC | Age: 62
End: 2022-10-04

## 2022-10-04 DIAGNOSIS — D58.2 ABNORMAL HEMOGLOBIN (HCC): Primary | ICD-10-CM

## 2022-10-04 NOTE — TELEPHONE ENCOUNTER
----- Message from GABBY Mcguire sent at 10/3/2022 12:56 PM CDT -----  Diabetes is worsening. Hgba1c 6.8. Has Pt ever seen the diabetic educator? If not she would benefit from it. Louise the educator will be in out office now a few times a month.

## 2022-10-05 ENCOUNTER — OFFICE VISIT (OUTPATIENT)
Dept: INTERNAL MEDICINE CLINIC | Facility: CLINIC | Age: 62
End: 2022-10-05
Payer: COMMERCIAL

## 2022-10-05 VITALS
HEIGHT: 67.75 IN | SYSTOLIC BLOOD PRESSURE: 128 MMHG | TEMPERATURE: 99 F | BODY MASS INDEX: 38.64 KG/M2 | HEART RATE: 90 BPM | OXYGEN SATURATION: 93 % | DIASTOLIC BLOOD PRESSURE: 82 MMHG | WEIGHT: 252 LBS

## 2022-10-05 DIAGNOSIS — M62.830 SPASM OF MUSCLE OF LOWER BACK: ICD-10-CM

## 2022-10-05 DIAGNOSIS — E78.2 MIXED HYPERLIPIDEMIA: ICD-10-CM

## 2022-10-05 DIAGNOSIS — Z00.00 LABORATORY EXAM ORDERED AS PART OF ROUTINE GENERAL MEDICAL EXAMINATION: ICD-10-CM

## 2022-10-05 DIAGNOSIS — E66.9 OBESITY (BMI 30-39.9): ICD-10-CM

## 2022-10-05 DIAGNOSIS — I10 ESSENTIAL HYPERTENSION, BENIGN: ICD-10-CM

## 2022-10-05 DIAGNOSIS — L98.9 SKIN LESION: ICD-10-CM

## 2022-10-05 DIAGNOSIS — Z00.00 WELLNESS EXAMINATION: Primary | ICD-10-CM

## 2022-10-05 LAB
HGB A2 MFR BLD: 2.9 % (ref 1.5–3.5)
HGB PNL BLD ELPH: 63.4 % (ref 95.5–100)

## 2022-10-05 PROCEDURE — 3074F SYST BP LT 130 MM HG: CPT | Performed by: FAMILY MEDICINE

## 2022-10-05 PROCEDURE — 3008F BODY MASS INDEX DOCD: CPT | Performed by: FAMILY MEDICINE

## 2022-10-05 PROCEDURE — 99396 PREV VISIT EST AGE 40-64: CPT | Performed by: FAMILY MEDICINE

## 2022-10-05 PROCEDURE — 3079F DIAST BP 80-89 MM HG: CPT | Performed by: FAMILY MEDICINE

## 2022-10-05 RX ORDER — METHOCARBAMOL 500 MG/1
500 TABLET, FILM COATED ORAL 3 TIMES DAILY PRN
Qty: 30 TABLET | Refills: 1 | Status: SHIPPED | OUTPATIENT
Start: 2022-10-05

## 2022-10-05 RX ORDER — ATORVASTATIN CALCIUM 20 MG/1
20 TABLET, FILM COATED ORAL NIGHTLY
Qty: 90 TABLET | Refills: 1 | Status: SHIPPED | OUTPATIENT
Start: 2022-10-05

## 2022-10-05 RX ORDER — METHOCARBAMOL 500 MG/1
500 TABLET, FILM COATED ORAL 3 TIMES DAILY PRN
Qty: 30 TABLET | Refills: 1 | Status: CANCELLED | OUTPATIENT
Start: 2022-10-05

## 2022-10-06 PROBLEM — D58.2 HEMOGLOBIN C TRAIT (HCC): Status: ACTIVE | Noted: 2022-10-06

## 2022-10-06 PROBLEM — D58.2 HEMOGLOBIN C TRAIT: Status: ACTIVE | Noted: 2022-10-06

## 2022-10-07 RX ORDER — METOPROLOL SUCCINATE 100 MG/1
TABLET, EXTENDED RELEASE ORAL
Qty: 90 TABLET | Refills: 1 | Status: SHIPPED | OUTPATIENT
Start: 2022-10-07

## 2022-10-07 RX ORDER — BUPROPION HYDROCHLORIDE 150 MG/1
TABLET, EXTENDED RELEASE ORAL
Qty: 180 TABLET | Refills: 3 | OUTPATIENT
Start: 2022-10-07

## 2022-10-07 RX ORDER — FUROSEMIDE 40 MG/1
TABLET ORAL
Qty: 90 TABLET | Refills: 1 | Status: SHIPPED | OUTPATIENT
Start: 2022-10-07

## 2022-10-07 NOTE — TELEPHONE ENCOUNTER
Bupropion 150 mg was discontinued on LOV with TO. Metoprolol  mg  Filled 7-20-22  Qty 90  0 refills  No upcoming appt. LOV 10-5-22    Furosemide 40 mg  Filled 7-20-22  Qty 90  0 refills  No upcoming appt.   LOV 10-5-22

## 2022-10-10 RX ORDER — IRBESARTAN 300 MG/1
TABLET ORAL
Qty: 90 TABLET | Refills: 0 | Status: SHIPPED | OUTPATIENT
Start: 2022-10-10

## 2022-12-14 RX ORDER — CLOPIDOGREL BISULFATE 75 MG/1
TABLET ORAL
Qty: 90 TABLET | Refills: 3 | Status: SHIPPED | OUTPATIENT
Start: 2022-12-14

## 2023-01-30 RX ORDER — METOPROLOL SUCCINATE 100 MG/1
TABLET, EXTENDED RELEASE ORAL
Qty: 90 TABLET | Refills: 0 | Status: SHIPPED | OUTPATIENT
Start: 2023-01-30

## 2023-02-01 DIAGNOSIS — M62.830 SPASM OF MUSCLE OF LOWER BACK: ICD-10-CM

## 2023-02-02 RX ORDER — METHOCARBAMOL 500 MG/1
TABLET, FILM COATED ORAL
Qty: 30 TABLET | Refills: 1 | Status: SHIPPED | OUTPATIENT
Start: 2023-02-02

## 2023-03-11 RX ORDER — FUROSEMIDE 40 MG/1
TABLET ORAL
Qty: 90 TABLET | Refills: 0 | Status: SHIPPED | OUTPATIENT
Start: 2023-03-11

## 2023-03-13 RX ORDER — IRBESARTAN 300 MG/1
TABLET ORAL
Qty: 90 TABLET | Refills: 0 | Status: SHIPPED | OUTPATIENT
Start: 2023-03-13

## 2023-03-21 ENCOUNTER — OFFICE VISIT (OUTPATIENT)
Dept: INTERNAL MEDICINE CLINIC | Facility: CLINIC | Age: 63
End: 2023-03-21
Payer: COMMERCIAL

## 2023-03-21 ENCOUNTER — HOSPITAL ENCOUNTER (OUTPATIENT)
Dept: GENERAL RADIOLOGY | Age: 63
Discharge: HOME | End: 2023-03-21
Attending: FAMILY MEDICINE
Payer: COMMERCIAL

## 2023-03-21 VITALS
HEIGHT: 67 IN | BODY MASS INDEX: 40.71 KG/M2 | SYSTOLIC BLOOD PRESSURE: 138 MMHG | OXYGEN SATURATION: 98 % | TEMPERATURE: 99 F | HEART RATE: 83 BPM | RESPIRATION RATE: 16 BRPM | WEIGHT: 259.38 LBS | DIASTOLIC BLOOD PRESSURE: 80 MMHG

## 2023-03-21 DIAGNOSIS — G89.29 CHRONIC BILATERAL LOW BACK PAIN WITH RIGHT-SIDED SCIATICA: Primary | ICD-10-CM

## 2023-03-21 DIAGNOSIS — M54.41 CHRONIC BILATERAL LOW BACK PAIN WITH RIGHT-SIDED SCIATICA: ICD-10-CM

## 2023-03-21 DIAGNOSIS — M79.604 RIGHT LEG PAIN: ICD-10-CM

## 2023-03-21 DIAGNOSIS — H43.393 FLOATERS IN VISUAL FIELD, BILATERAL: ICD-10-CM

## 2023-03-21 DIAGNOSIS — M54.41 CHRONIC BILATERAL LOW BACK PAIN WITH RIGHT-SIDED SCIATICA: Primary | ICD-10-CM

## 2023-03-21 DIAGNOSIS — G89.29 CHRONIC BILATERAL LOW BACK PAIN WITH RIGHT-SIDED SCIATICA: ICD-10-CM

## 2023-03-21 PROCEDURE — 3079F DIAST BP 80-89 MM HG: CPT | Performed by: FAMILY MEDICINE

## 2023-03-21 PROCEDURE — 3075F SYST BP GE 130 - 139MM HG: CPT | Performed by: FAMILY MEDICINE

## 2023-03-21 PROCEDURE — 3008F BODY MASS INDEX DOCD: CPT | Performed by: FAMILY MEDICINE

## 2023-03-21 PROCEDURE — 99214 OFFICE O/P EST MOD 30 MIN: CPT | Performed by: FAMILY MEDICINE

## 2023-03-21 PROCEDURE — 72110 X-RAY EXAM L-2 SPINE 4/>VWS: CPT | Performed by: FAMILY MEDICINE

## 2023-03-21 RX ORDER — METHYLPREDNISOLONE 4 MG/1
TABLET ORAL
Qty: 1 EACH | Refills: 0 | Status: SHIPPED | OUTPATIENT
Start: 2023-03-21

## 2023-04-05 ENCOUNTER — HOSPITAL ENCOUNTER (OUTPATIENT)
Dept: ULTRASOUND IMAGING | Age: 63
Discharge: HOME | End: 2023-04-05
Attending: FAMILY MEDICINE
Payer: COMMERCIAL

## 2023-04-05 ENCOUNTER — TELEPHONE (OUTPATIENT)
Dept: INTERNAL MEDICINE CLINIC | Facility: CLINIC | Age: 63
End: 2023-04-05

## 2023-04-05 DIAGNOSIS — G89.29 CHRONIC BILATERAL LOW BACK PAIN WITH RIGHT-SIDED SCIATICA: ICD-10-CM

## 2023-04-05 DIAGNOSIS — M79.604 RIGHT LEG PAIN: ICD-10-CM

## 2023-04-05 DIAGNOSIS — M54.41 CHRONIC BILATERAL LOW BACK PAIN WITH RIGHT-SIDED SCIATICA: ICD-10-CM

## 2023-04-05 DIAGNOSIS — M79.604 RIGHT LEG PAIN: Primary | ICD-10-CM

## 2023-04-05 NOTE — TELEPHONE ENCOUNTER
Incoming call from Trisha Martinez at [facility]. Called to request clarification on order for:    US VENOUS INSUFFICIENCY (REFLUX) RIGHT LOWER EXT SH(CPT=93971)    Trisha Martinez stated his review of previous US study on 2/8/22 along with patient's description of her problems sounds more like arterial disease than venous disease. Trisha Martinez also stated that he feels strongly that pt's previous US study was interpreted incorrectly. RN informed will call back with clarification asap. Consulted with TO - based on pt's LOV note 3/21/23 with SM, the correct test was ordered. RN called Trisha Martinez back, x [phone] and informed him to proceed with test as ordered. Trisha Martinez v/u.

## 2023-04-05 NOTE — TELEPHONE ENCOUNTER
TO/SM - please advise, ty! Pt called office, told front office staff that the US test was not done and she wanted to speak to a nurse. Pt stated 2 weeks ago, her right upper leg pain started waking her up at night. Described as hot poker burning, pain, swelling. When pt gets out of bed and walks around, the pain goes away. Pt said she does not have varicose veins anywhere on her body. Pt stated last year when she had pain in her toe, it was due to a clogged artery and she went through procedure to unblock this. Pt is requesting TO/SM to order US for arteries, not veins. RN explained to pt that SM is gone for the day and TO is with patients. RN will call back by tomorrow. Pt stated she works from home and best number to call is cell #[phone]. Second option is to call the home phone.

## 2023-04-06 NOTE — TELEPHONE ENCOUNTER
Pended new order for Venous US (since yesterday's was cxld). Please sign if agree. Also pended referral for Dr. Jm Ureña (neurospine), as SM stated in results that if steroids did not help back pain may need referral. Please sign if agree. Pt agreed to get US as advised, but she feels like there is still more going on in her right leg resulting from her injury 3 years ago. She stated she still has big knot on top of right thigh. She would like imaging done on that since she's going in for US anyways. Please advise if any addtl imaging could be ordered?

## 2023-05-13 DIAGNOSIS — M62.830 SPASM OF MUSCLE OF LOWER BACK: ICD-10-CM

## 2023-05-14 RX ORDER — METHOCARBAMOL 500 MG/1
TABLET, FILM COATED ORAL
Qty: 30 TABLET | Refills: 1 | Status: SHIPPED | OUTPATIENT
Start: 2023-05-14

## 2023-06-20 DIAGNOSIS — M62.830 SPASM OF MUSCLE OF LOWER BACK: ICD-10-CM

## 2023-06-21 RX ORDER — METHOCARBAMOL 500 MG/1
500 TABLET, FILM COATED ORAL 3 TIMES DAILY PRN
Qty: 30 TABLET | Refills: 1 | Status: SHIPPED | OUTPATIENT
Start: 2023-06-21

## 2023-07-14 RX ORDER — ATORVASTATIN CALCIUM 20 MG/1
20 TABLET, FILM COATED ORAL NIGHTLY
Qty: 90 TABLET | Refills: 0 | Status: SHIPPED | OUTPATIENT
Start: 2023-07-14

## 2023-07-14 NOTE — TELEPHONE ENCOUNTER
Requesting    Name from pharmacy: Atorvastatin Calcium 20 MG Oral Tablet         Will file in chart as: ATORVASTATIN 20 MG Oral Tab    Sig: TAKE 1 TABLET BY MOUTH  NIGHTLY    Disp: 90 tablet    Refills: 3    Start: 7/14/2023    Class: Normal    Non-formulary    To pharmacy: Requesting 1 year supply    Last ordered: 9 months ago by Delbert Godoy MD Last refill: 5/8/2023    Rx #: [id number]    Cholesterol Medication Protocol Egpcpy1907/14/2023 07:08 AM   Protocol Details ALT < 80    ALT resulted within past year    Lipid panel within past 12 months    Appointment within past 12 or next 3 months        LOV: 10/5/2022  RTC: None noted   Last Relevant Labs: 10/1/2022  Filled: 5/8/2023 #90 with 0 refills    No future appointments.

## 2023-08-14 ENCOUNTER — TELEPHONE (OUTPATIENT)
Dept: INTERNAL MEDICINE CLINIC | Facility: CLINIC | Age: 63
End: 2023-08-14

## 2023-08-14 DIAGNOSIS — Z00.00 LABORATORY EXAMINATION ORDERED AS PART OF A ROUTINE GENERAL MEDICAL EXAMINATION: ICD-10-CM

## 2023-08-14 DIAGNOSIS — Z12.31 SCREENING MAMMOGRAM FOR BREAST CANCER: Primary | ICD-10-CM

## 2023-08-14 NOTE — TELEPHONE ENCOUNTER
Appt scheduled   Future Appointments   Date Time Provider Mic Toribioi   10/6/2023  9:00 AM Kemi Kauffman MD [facility] [place]     Patient would like to have labs and mammogram done before she comes in. She would like a call back with an update.

## 2023-08-17 ENCOUNTER — HOSPITAL ENCOUNTER (OUTPATIENT)
Dept: GENERAL RADIOLOGY | Facility: HOSPITAL | Age: 63
Discharge: HOME | End: 2023-08-17
Attending: NURSE PRACTITIONER
Payer: COMMERCIAL

## 2023-08-17 ENCOUNTER — HOSPITAL ENCOUNTER (OUTPATIENT)
Dept: LAB | Facility: HOSPITAL | Age: 63
Discharge: HOME | End: 2023-08-17
Attending: NURSE PRACTITIONER
Payer: COMMERCIAL

## 2023-08-17 DIAGNOSIS — Z01.818 PREOP EXAMINATION: ICD-10-CM

## 2023-08-17 LAB
ANION GAP SERPL CALC-SCNC: 5 MMOL/L (ref 0–18)
BASOPHILS # BLD AUTO: 0.04 X10(3) UL (ref 0–0.2)
BASOPHILS NFR BLD AUTO: 0.4 %
BUN BLD-MCNC: 19 MG/DL (ref 7–18)
CALCIUM BLD-MCNC: 9.6 MG/DL (ref 8.5–10.1)
CHLORIDE SERPL-SCNC: 107 MMOL/L (ref 98–112)
CO2 SERPL-SCNC: 27 MMOL/L (ref 21–32)
CREAT BLD-MCNC: 1.35 MG/DL
EGFRCR SERPLBLD CKD-EPI 2021: 45 ML/MIN/1.73M2 (ref 60–?)
EOSINOPHIL # BLD AUTO: 0.14 X10(3) UL (ref 0–0.7)
EOSINOPHIL NFR BLD AUTO: 1.4 %
ERYTHROCYTE [DISTWIDTH] IN BLOOD BY AUTOMATED COUNT: 13.3 %
FASTING STATUS PATIENT QL REPORTED: NO
GLUCOSE BLD-MCNC: 137 MG/DL (ref 70–99)
HCT VFR BLD AUTO: 41.4 %
HGB BLD-MCNC: 14.2 G/DL
IMM GRANULOCYTES # BLD AUTO: 0.02 X10(3) UL (ref 0–1)
IMM GRANULOCYTES NFR BLD: 0.2 %
LYMPHOCYTES # BLD AUTO: 3.11 X10(3) UL (ref 1–4)
LYMPHOCYTES NFR BLD AUTO: 31.6 %
MCH RBC QN AUTO: 28.8 PG (ref 26–34)
MCHC RBC AUTO-ENTMCNC: 34.3 G/DL (ref 31–37)
MCV RBC AUTO: 84 FL
MONOCYTES # BLD AUTO: 0.76 X10(3) UL (ref 0.1–1)
MONOCYTES NFR BLD AUTO: 7.7 %
NEUTROPHILS # BLD AUTO: 5.78 X10 (3) UL (ref 1.5–7.7)
NEUTROPHILS # BLD AUTO: 5.78 X10(3) UL (ref 1.5–7.7)
NEUTROPHILS NFR BLD AUTO: 58.7 %
OSMOLALITY SERPL CALC.SUM OF ELEC: 292 MOSM/KG (ref 275–295)
PLATELET # BLD AUTO: 273 10(3)UL (ref 150–450)
POTASSIUM SERPL-SCNC: 3.9 MMOL/L (ref 3.5–5.1)
RBC # BLD AUTO: 4.93 X10(6)UL
SODIUM SERPL-SCNC: 139 MMOL/L (ref 136–145)
WBC # BLD AUTO: 9.9 X10(3) UL (ref 4–11)

## 2023-08-17 PROCEDURE — 85025 COMPLETE CBC W/AUTO DIFF WBC: CPT | Performed by: NURSE PRACTITIONER

## 2023-08-17 PROCEDURE — 36415 COLL VENOUS BLD VENIPUNCTURE: CPT | Performed by: NURSE PRACTITIONER

## 2023-08-17 PROCEDURE — 71046 X-RAY EXAM CHEST 2 VIEWS: CPT | Performed by: NURSE PRACTITIONER

## 2023-08-17 PROCEDURE — 80048 BASIC METABOLIC PNL TOTAL CA: CPT | Performed by: NURSE PRACTITIONER

## 2023-08-29 NOTE — PAT NURSING NOTE
Preprocedure Instructions     Pre-Procedure Instructions: Encouraged to check in electronically via [employer]     Visitor Instructions     Adult Patients: 2 Adult Care Partners can accompany the patient day of procedure. 2 Care Partners may visit [phone] during inpatient stay     PreOp Instructions     You are scheduled for: a Cardiac Procedure     Date of Procedure: 09/06/23     Diet Instructions: Do not eat or drink anything after midnight     Medications: Take an Aspirin 81 mg tablet the day of your procedure; Take Plavix 75mg the day of your procedure;Medications you are allowed to take can be taken with a sip of water the morning of your procedure     Medications to Stop: Hold herbal supplements and vitamins on day of procedure     Other Medications: Please hold Lasix/Furosemide on morning of procedure (do not take)     Skin Prep: Shower with antibacterial soap using a clean washcloth, prior to procedure, clean wrists and groin areas     Arrival Time: You will receive a call the afternoon before your procedure between 2 pm to 5 pm on what time you should arrive the day of your procedure    Driving After Procedure: If sedation is given, you WILL NOT be able to drive home. You will need a responsible adult  to drive you home. ;Cannot take uber or cab unless approved by physician     Discharge Teaching: Most people can resume normal activities in 2-3 days; Your nurse will give you specific instructions before discharge; Any questions, please call the physician's office     Park in the [place] [employer] garage at [address] in at the [facility] reception desk. Our  will be there to check you in for your procedure. Please bring your insurance cards and ID with you.  [employer] is available starting at 6 am.                                                                                                                                         Please DO NOT respond to this message, the inbasket is not monitored for messages. For any questions, please call the physician's office.

## 2023-09-06 ENCOUNTER — HOSPITAL ENCOUNTER (OUTPATIENT)
Dept: INTERVENTIONAL RADIOLOGY/VASCULAR | Facility: HOSPITAL | Age: 63
Discharge: HOME | End: 2023-09-06
Attending: NURSE PRACTITIONER | Admitting: INTERNAL MEDICINE
Payer: COMMERCIAL

## 2023-09-06 VITALS
SYSTOLIC BLOOD PRESSURE: 140 MMHG | DIASTOLIC BLOOD PRESSURE: 81 MMHG | WEIGHT: 250 LBS | OXYGEN SATURATION: 96 % | RESPIRATION RATE: 12 BRPM | BODY MASS INDEX: 39.24 KG/M2 | HEIGHT: 67 IN | HEART RATE: 73 BPM | TEMPERATURE: 97 F

## 2023-09-06 DIAGNOSIS — R07.9 CHEST PAIN: ICD-10-CM

## 2023-09-06 PROCEDURE — B211YZZ FLUOROSCOPY OF MULTIPLE CORONARY ARTERIES USING OTHER CONTRAST: ICD-10-PCS | Performed by: INTERNAL MEDICINE

## 2023-09-06 PROCEDURE — 93458 L HRT ARTERY/VENTRICLE ANGIO: CPT | Performed by: INTERNAL MEDICINE

## 2023-09-06 PROCEDURE — B215YZZ FLUOROSCOPY OF LEFT HEART USING OTHER CONTRAST: ICD-10-PCS | Performed by: INTERNAL MEDICINE

## 2023-09-06 PROCEDURE — 99152 MOD SED SAME PHYS/QHP 5/>YRS: CPT | Performed by: INTERNAL MEDICINE

## 2023-09-06 RX ORDER — HEPARIN SODIUM 5000 [USP'U]/ML
INJECTION, SOLUTION INTRAVENOUS; SUBCUTANEOUS
Status: COMPLETED
Start: 2023-09-06 | End: 2023-09-06

## 2023-09-06 RX ORDER — LIDOCAINE HYDROCHLORIDE 10 MG/ML
INJECTION, SOLUTION EPIDURAL; INFILTRATION; INTRACAUDAL; PERINEURAL
Status: COMPLETED
Start: 2023-09-06 | End: 2023-09-06

## 2023-09-06 RX ORDER — MORPHINE SULFATE 4 MG/ML
INJECTION, SOLUTION INTRAMUSCULAR; INTRAVENOUS
Status: COMPLETED
Start: 2023-09-06 | End: 2023-09-06

## 2023-09-06 RX ORDER — SODIUM CHLORIDE 9 MG/ML
INJECTION, SOLUTION INTRAVENOUS
Status: DISCONTINUED | OUTPATIENT
Start: 2023-09-07 | End: 2023-09-06 | Stop reason: HOSPADM

## 2023-09-06 RX ORDER — CLOPIDOGREL BISULFATE 75 MG/1
TABLET ORAL
Status: COMPLETED
Start: 2023-09-06 | End: 2023-09-06

## 2023-09-06 RX ORDER — MIDAZOLAM HYDROCHLORIDE 1 MG/ML
INJECTION INTRAMUSCULAR; INTRAVENOUS
Status: COMPLETED
Start: 2023-09-06 | End: 2023-09-06

## 2023-09-06 RX ORDER — ACETAMINOPHEN 325 MG/1
TABLET ORAL
Status: COMPLETED
Start: 2023-09-06 | End: 2023-09-06

## 2023-09-06 RX ORDER — ASPIRIN 81 MG/1
TABLET, CHEWABLE ORAL
Status: COMPLETED
Start: 2023-09-06 | End: 2023-09-06

## 2023-09-06 RX ORDER — IODIXANOL 320 MG/ML
100 INJECTION, SOLUTION INTRAVASCULAR
Status: COMPLETED | OUTPATIENT
Start: 2023-09-06 | End: 2023-09-06

## 2023-09-06 RX ADMIN — ASPIRIN: 81 TABLET, CHEWABLE ORAL at 11:24:00

## 2023-09-06 RX ADMIN — CLOPIDOGREL BISULFATE: 75 TABLET ORAL at 11:24:00

## 2023-09-06 RX ADMIN — IODIXANOL 100 ML: 320 INJECTION, SOLUTION INTRAVASCULAR at 12:07:00

## 2023-09-06 NOTE — IVS NOTE
Patient discharged home post cardiac cath. Pt is able to sit up and ambulate without difficulty. Pt's vital signs are stable. Right groin procedural access site is dry and intact with no signs and symptoms of bleeding or hematoma. Pt tolerated food/fluids. Dr. Ashley Arredondo spoke to pt/family post procedure. Instructions provided and pt/family verbalized understanding. PIV removed. Discharge via wheelchair with all belongings.

## 2023-09-07 RX ORDER — FUROSEMIDE 40 MG/1
TABLET ORAL
Qty: 90 TABLET | Refills: 0 | Status: SHIPPED | OUTPATIENT
Start: 2023-09-07

## 2023-09-07 NOTE — TELEPHONE ENCOUNTER
Furosemide 40 mg  Filled 3-11-23  Qty 90  0 refills  Future Appointments   Date Time Provider Mic Mota   10/6/2023  9:00 AM Denise Begum MD [facility] 8 [facility] 10-5-22 TO

## 2023-09-08 ENCOUNTER — TELEPHONE (OUTPATIENT)
Dept: INTERNAL MEDICINE CLINIC | Facility: CLINIC | Age: 63
End: 2023-09-08

## 2023-09-08 RX ORDER — METOPROLOL SUCCINATE 100 MG/1
100 TABLET, EXTENDED RELEASE ORAL DAILY
Qty: 90 TABLET | Refills: 0 | Status: SHIPPED | OUTPATIENT
Start: 2023-09-08

## 2023-09-08 NOTE — TELEPHONE ENCOUNTER
METOPROLOL ER SUCCINATE 100MG TABS          Will file in chart as: METOPROLOL SUCCINATE  MG Oral Tablet 24 Hr    Sig: TAKE 1 TABLET(100 MG) BY MOUTH DAILY    Disp: 90 tablet    Refills: 0 (Pharmacy requested: Not specified)    Start: 9/8/2023    Class: Normal    Non-formulary    To pharmacy: **Patient requests 90 days supply**    Last ordered: 7 months ago by Mark Anthony Galinod MD Last refill: 3/12/2023    Rx #: [id number]    Hypertension Medications Protocol Uhtmyt9709/08/2023 11:25 AM   Protocol Details CMP or BMP in past 12 months    Last serum creatinine< 2.0    Appointment in past 6 or next 3 months      LOV 3/21/23   RTC none  Refilled 1/30/23 90 tabs 0 refills   Last labs 10/1/22   Future Appointments   Date Time Provider Mic Mota   10/6/2023  9:00 AM Jossie Nicholson MD [facility] 8 [facility] [place]

## 2023-09-08 NOTE — TELEPHONE ENCOUNTER
Incoming fax from [facility]     Patients Transthoracic Echocardiogram Report from 8/18/2023    Placed in TO in basket for review

## 2023-09-26 NOTE — PROCEDURES
[address]    PATIENT'S NAME: Lani Earing   ATTENDING PHYSICIAN: Shasha Myers M.D. OPERATING PHYSICIAN: Shasha Myers M.D. PATIENT ACCOUNT#:   [de-identified]    LOCATION:  [address]  MEDICAL RECORD #:   [id number]       YOB: 1960  ADMISSION DATE:       09/06/2023      OPERATION DATE:  09/06/2023    CARDIAC PROCEDURE TRANSCRIPTION    CARDIAC CATHETERIZATION     PREOPERATIVE DIAGNOSIS:    POSTOPERATIVE DIAGNOSIS:    PROCEDURE PERFORMED:      DESCRIPTION OF PROCEDURE:  The right groin was anesthetized, 6-Bahraini sheath introduced. Left ventriculogram done in standard 30 degree EVANS. There is good wall motion and contractility. There is moderate calcification of the coronaries. The LAD has an eccentric 80% stenosis. There is a prior stent in the diagonal.  The circumflex has an 80% stenosis. The right coronary artery has 70% to 80% stenosis. IMPRESSION:    1. Good left ventricular function. 2.   Three-vessel disease. RECOMMENDATION:  We will discuss bypass surgery.     Dictated By Shasha Myers M.D.  d: 09/26/2023 07:34:39  t: 09/26/2023 09:54:51  Job [id number]  [facility]/

## 2023-10-02 ENCOUNTER — HOSPITAL ENCOUNTER (OUTPATIENT)
Dept: CARDIOLOGY CLINIC | Facility: HOSPITAL | Age: 63
Discharge: HOME | End: 2023-10-02
Attending: THORACIC SURGERY (CARDIOTHORACIC VASCULAR SURGERY)
Payer: COMMERCIAL

## 2023-10-02 ENCOUNTER — HOSPITAL ENCOUNTER (OUTPATIENT)
Dept: LAB | Facility: HOSPITAL | Age: 63
Discharge: HOME | End: 2023-10-02
Attending: THORACIC SURGERY (CARDIOTHORACIC VASCULAR SURGERY)
Payer: COMMERCIAL

## 2023-10-02 ENCOUNTER — HOSPITAL ENCOUNTER (OUTPATIENT)
Dept: INTERVENTIONAL RADIOLOGY/VASCULAR | Facility: HOSPITAL | Age: 63
Discharge: HOME | End: 2023-10-02
Attending: THORACIC SURGERY (CARDIOTHORACIC VASCULAR SURGERY)
Payer: COMMERCIAL

## 2023-10-02 ENCOUNTER — HOSPITAL ENCOUNTER (OUTPATIENT)
Dept: GENERAL RADIOLOGY | Facility: HOSPITAL | Age: 63
Discharge: HOME | End: 2023-10-02
Attending: THORACIC SURGERY (CARDIOTHORACIC VASCULAR SURGERY)
Payer: COMMERCIAL

## 2023-10-02 ENCOUNTER — HOSPITAL ENCOUNTER (OUTPATIENT)
Dept: CV DIAGNOSTICS | Facility: HOSPITAL | Age: 63
Discharge: HOME | End: 2023-10-02
Attending: THORACIC SURGERY (CARDIOTHORACIC VASCULAR SURGERY)
Payer: COMMERCIAL

## 2023-10-02 DIAGNOSIS — I25.10 CAD (CORONARY ARTERY DISEASE), NATIVE CORONARY ARTERY: ICD-10-CM

## 2023-10-02 DIAGNOSIS — Z91.89 AT HIGH RISK FOR BLEEDING: ICD-10-CM

## 2023-10-02 DIAGNOSIS — Z01.818 PRE-OP TESTING: ICD-10-CM

## 2023-10-02 LAB
ALBUMIN SERPL-MCNC: 3.8 G/DL (ref 3.4–5)
ALBUMIN/GLOB SERPL: 0.9 {RATIO} (ref 1–2)
ALP LIVER SERPL-CCNC: 93 U/L
ALT SERPL-CCNC: 37 U/L
ANION GAP SERPL CALC-SCNC: 7 MMOL/L (ref 0–18)
ANTIBODY SCREEN: NEGATIVE
APTT PPP: 21.1 SECONDS (ref 23.3–35.6)
ATRIAL RATE: 61 BPM
BASOPHILS # BLD AUTO: 0.05 X10(3) UL (ref 0–0.2)
BASOPHILS NFR BLD AUTO: 0.6 %
BILIRUB SERPL-MCNC: 0.6 MG/DL (ref 0.1–2)
BILIRUB UR QL STRIP.AUTO: NEGATIVE
BUN BLD-MCNC: 16 MG/DL (ref 7–18)
CALCIUM BLD-MCNC: 9.7 MG/DL (ref 8.5–10.1)
CHLORIDE SERPL-SCNC: 106 MMOL/L (ref 98–112)
CLARITY UR REFRACT.AUTO: CLEAR
CO2 SERPL-SCNC: 23 MMOL/L (ref 21–32)
CREAT BLD-MCNC: 1.14 MG/DL
EGFRCR SERPLBLD CKD-EPI 2021: 55 ML/MIN/1.73M2 (ref 60–?)
EOSINOPHIL # BLD AUTO: 0.24 X10(3) UL (ref 0–0.7)
EOSINOPHIL NFR BLD AUTO: 2.8 %
ERYTHROCYTE [DISTWIDTH] IN BLOOD BY AUTOMATED COUNT: 13.2 %
FASTING STATUS PATIENT QL REPORTED: NO
GLOBULIN PLAS-MCNC: 4.4 G/DL (ref 2.8–4.4)
GLUCOSE BLD-MCNC: 97 MG/DL (ref 70–99)
GLUCOSE UR STRIP.AUTO-MCNC: NORMAL MG/DL
HCT VFR BLD AUTO: 39 %
HGB BLD-MCNC: 13.6 G/DL
IMM GRANULOCYTES # BLD AUTO: 0.02 X10(3) UL (ref 0–1)
IMM GRANULOCYTES NFR BLD: 0.2 %
INR BLD: 0.99 (ref 0.85–1.16)
KETONES UR STRIP.AUTO-MCNC: NEGATIVE MG/DL
LEUKOCYTE ESTERASE UR QL STRIP.AUTO: NEGATIVE
LYMPHOCYTES # BLD AUTO: 3.04 X10(3) UL (ref 1–4)
LYMPHOCYTES NFR BLD AUTO: 35.7 %
MCH RBC QN AUTO: 28.9 PG (ref 26–34)
MCHC RBC AUTO-ENTMCNC: 34.9 G/DL (ref 31–37)
MCV RBC AUTO: 83 FL
MONOCYTES # BLD AUTO: 0.77 X10(3) UL (ref 0.1–1)
MONOCYTES NFR BLD AUTO: 9 %
NEUTROPHILS # BLD AUTO: 4.4 X10 (3) UL (ref 1.5–7.7)
NEUTROPHILS # BLD AUTO: 4.4 X10(3) UL (ref 1.5–7.7)
NEUTROPHILS NFR BLD AUTO: 51.7 %
NITRITE UR QL STRIP.AUTO: NEGATIVE
OSMOLALITY SERPL CALC.SUM OF ELEC: 283 MOSM/KG (ref 275–295)
P AXIS: 34 DEGREES
P-R INTERVAL: 160 MS
PH UR STRIP.AUTO: 5 [PH] (ref 5–8)
PLATELET # BLD AUTO: 274 10(3)UL (ref 150–450)
PROT SERPL-MCNC: 8.2 G/DL (ref 6.4–8.2)
PROT UR STRIP.AUTO-MCNC: NEGATIVE MG/DL
PROTHROMBIN TIME: 13.1 SECONDS (ref 11.6–14.8)
Q-T INTERVAL: 454 MS
QRS DURATION: 92 MS
QTC CALCULATION (BEZET): 457 MS
R AXIS: -21 DEGREES
RBC # BLD AUTO: 4.7 X10(6)UL
RBC UR QL AUTO: NEGATIVE
RH BLOOD TYPE: POSITIVE
SODIUM SERPL-SCNC: 136 MMOL/L (ref 136–145)
SP GR UR STRIP.AUTO: 1.01 (ref 1–1.03)
T AXIS: 15 DEGREES
UROBILINOGEN UR STRIP.AUTO-MCNC: NORMAL MG/DL
VENTRICULAR RATE: 61 BPM
WBC # BLD AUTO: 8.5 X10(3) UL (ref 4–11)

## 2023-10-02 PROCEDURE — 85025 COMPLETE CBC W/AUTO DIFF WBC: CPT | Performed by: THORACIC SURGERY (CARDIOTHORACIC VASCULAR SURGERY)

## 2023-10-02 PROCEDURE — 93005 ELECTROCARDIOGRAM TRACING: CPT

## 2023-10-02 PROCEDURE — 93010 ELECTROCARDIOGRAM REPORT: CPT | Performed by: INTERNAL MEDICINE

## 2023-10-02 PROCEDURE — 86900 BLOOD TYPING SEROLOGIC ABO: CPT | Performed by: THORACIC SURGERY (CARDIOTHORACIC VASCULAR SURGERY)

## 2023-10-02 PROCEDURE — 83036 HEMOGLOBIN GLYCOSYLATED A1C: CPT | Performed by: THORACIC SURGERY (CARDIOTHORACIC VASCULAR SURGERY)

## 2023-10-02 PROCEDURE — 85730 THROMBOPLASTIN TIME PARTIAL: CPT | Performed by: THORACIC SURGERY (CARDIOTHORACIC VASCULAR SURGERY)

## 2023-10-02 PROCEDURE — 86901 BLOOD TYPING SEROLOGIC RH(D): CPT | Performed by: THORACIC SURGERY (CARDIOTHORACIC VASCULAR SURGERY)

## 2023-10-02 PROCEDURE — 71045 X-RAY EXAM CHEST 1 VIEW: CPT | Performed by: THORACIC SURGERY (CARDIOTHORACIC VASCULAR SURGERY)

## 2023-10-02 PROCEDURE — 81003 URINALYSIS AUTO W/O SCOPE: CPT | Performed by: THORACIC SURGERY (CARDIOTHORACIC VASCULAR SURGERY)

## 2023-10-02 PROCEDURE — 86850 RBC ANTIBODY SCREEN: CPT | Performed by: THORACIC SURGERY (CARDIOTHORACIC VASCULAR SURGERY)

## 2023-10-02 PROCEDURE — 80053 COMPREHEN METABOLIC PANEL: CPT | Performed by: THORACIC SURGERY (CARDIOTHORACIC VASCULAR SURGERY)

## 2023-10-02 PROCEDURE — 85610 PROTHROMBIN TIME: CPT | Performed by: THORACIC SURGERY (CARDIOTHORACIC VASCULAR SURGERY)

## 2023-10-02 PROCEDURE — 3044F HG A1C LEVEL LT 7.0%: CPT | Performed by: FAMILY MEDICINE

## 2023-10-02 PROCEDURE — 36415 COLL VENOUS BLD VENIPUNCTURE: CPT | Performed by: THORACIC SURGERY (CARDIOTHORACIC VASCULAR SURGERY)

## 2023-10-02 NOTE — CM/SW NOTE
10/02/23 1500   CM/SW Referral Data   Referral Source Social Work (self-referral)   Reason for Referral Discharge planning   Informant Patient;Spouse/Significant Other   Medical Hx   Does patient have an established PCP? Yes   Patient Info   Patient's Current Mental Status at Time of Assessment Alert;Oriented   Patient's [address]   Number of Levels in Home 2   Number of Stair in Home 17   Patient lives with Spouse/Significant other   Patient Status Prior to Admission   Independent with ADLs and Mobility Yes   Discharge Needs   Anticipated D/C needs Home health care     SW met with pt and spouse during PAT to work on discharge planning. Pt is a 62 y/o female who is going for CABG procedure with Dr Juan Leo on 10/11. The pt is alert and oriented x4. Pt resides at home w/ spouse in 2 story home w/ 17 stairs to get to the bedroom. Pt has no DME at home. Pt is independent at home, confirms PCP is Dr Ruth Little. Pt picks up medications at [place], and sometimes does mail order. Pt states she works as an  and does not do any heavy lifting at work. Spouse confirms that he'll be available for support at discharge. SW explained that [facility] at [facility] and [facility] Both pt and spouse agree to [place] [address]. SW notified [facility] liaison of the new referral. All questions addressed. Emotional support provided.  &  to remain available and supportive for discharge planning needs.     BAO Charles, [facility]  Discharge Planner  [id number]

## 2023-10-02 NOTE — PAT NURSING NOTE
PAT nursing note: met with patient and spouse Ximena Brown in Structural Heart to discuss instructions for her coronary artery bypass surgery scheduled 10/11 with Dr. Max Orozco; testing underway; reviewed binder, medication stop dates and shower instructions/scheduled; strict npo after 2200; no extra water/fluids, mints, gum, candy, etc.; [employer] garage; register [facility] in the [facility]; morning of surgery only take Metoprolol with a sip of water; last dose of vitamins, supplements, herbal supplements, NSAIDs and Clopidogrel 10/3, Aspirin 10/5, Irbesartan 10/7, Cannabis-edibles 10/9, smoke 10/3-pt verbalized understanding of medication instructions; denies taking ACEs or other anti-coagulants; no PPM/ICD, or spinal cord stimulator; admit 5-7 days; visitors welcome; bring binder and keep personal belongings to a minimum; Covid test 10/9 @0715/BBK; intra-op and post-op expectations discussed; all questions answered. 5 meter walk: 3.72, 3.79, 3.90     Addendum: confirmed with patient that medications were stopped as directed.

## 2023-10-03 NOTE — CM/SW NOTE
RIZWAN was made aware that [facility] is not in network with pt's insurance. RIZWAN/ANA to send referral to [facility] once admitted after surgery.      Justo Gilliam, BAO, [facility]  Discharge Planner  [id number]

## 2023-10-03 NOTE — PROGRESS NOTES
Met with patient and  in PAT to discuss pre op teaching, post op expectations, discharge planning. Discussed roles of CM/SW, PT/OT, Cardiac rehab in education and recovery. All questions answered, binder given. 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 Pts  will be home to assist.  Pt requesting carotid ultrasound be done as pts father had carotid disease, ordered and scheduled for Monday. Will follow up post op.   Flaquito Martell RN  Clinical Coordinator  CV Surgery

## 2023-10-04 LAB — HGBA1C: 6.7 %

## 2023-10-09 ENCOUNTER — HOSPITAL ENCOUNTER (OUTPATIENT)
Dept: ULTRASOUND IMAGING | Facility: HOSPITAL | Age: 63
Discharge: HOME | End: 2023-10-09
Attending: THORACIC SURGERY (CARDIOTHORACIC VASCULAR SURGERY)
Payer: COMMERCIAL

## 2023-10-09 ENCOUNTER — LAB ENCOUNTER (OUTPATIENT)
Dept: LAB | Facility: HOSPITAL | Age: 63
End: 2023-10-09
Attending: THORACIC SURGERY (CARDIOTHORACIC VASCULAR SURGERY)
Payer: COMMERCIAL

## 2023-10-09 DIAGNOSIS — I73.9 PAD (PERIPHERAL ARTERY DISEASE) (HCC): ICD-10-CM

## 2023-10-09 DIAGNOSIS — R09.89 CAROTID BRUIT: ICD-10-CM

## 2023-10-09 DIAGNOSIS — I25.10 CAD (CORONARY ARTERY DISEASE), NATIVE CORONARY ARTERY: ICD-10-CM

## 2023-10-09 DIAGNOSIS — Z00.00 LABORATORY EXAMINATION ORDERED AS PART OF A ROUTINE GENERAL MEDICAL EXAMINATION: ICD-10-CM

## 2023-10-09 DIAGNOSIS — I25.10 CAD (CORONARY ARTERY DISEASE): ICD-10-CM

## 2023-10-09 DIAGNOSIS — Z01.818 PRE-OP TESTING: ICD-10-CM

## 2023-10-09 PROCEDURE — 87635 SARS-COV-2 COVID-19 AMP PRB: CPT

## 2023-10-09 PROCEDURE — 93880 EXTRACRANIAL BILAT STUDY: CPT | Performed by: THORACIC SURGERY (CARDIOTHORACIC VASCULAR SURGERY)

## 2023-10-10 ENCOUNTER — ANESTHESIA EVENT (OUTPATIENT)
Dept: CARDIAC SURGERY | Facility: HOSPITAL | Age: 63
End: 2023-10-10
Payer: COMMERCIAL

## 2023-10-10 LAB — SARS-COV-2 RNA RESP QL NAA+PROBE: NOT DETECTED

## 2023-10-11 ENCOUNTER — ANESTHESIA (OUTPATIENT)
Dept: CARDIAC SURGERY | Facility: HOSPITAL | Age: 63
End: 2023-10-11
Payer: COMMERCIAL

## 2023-10-11 ENCOUNTER — HOSPITAL ENCOUNTER (INPATIENT)
Facility: HOSPITAL | Age: 63
LOS: 6 days | Discharge: HOME | DRG: 235 | End: 2023-10-17
Attending: THORACIC SURGERY (CARDIOTHORACIC VASCULAR SURGERY) | Admitting: THORACIC SURGERY (CARDIOTHORACIC VASCULAR SURGERY)
Payer: COMMERCIAL

## 2023-10-11 ENCOUNTER — APPOINTMENT (OUTPATIENT)
Dept: GENERAL RADIOLOGY | Facility: HOSPITAL | Age: 63
DRG: 235 | End: 2023-10-11
Attending: PHYSICIAN ASSISTANT
Payer: COMMERCIAL

## 2023-10-11 ENCOUNTER — ANESTHESIA EVENT (OUTPATIENT)
Dept: MEDSURG UNIT | Facility: HOSPITAL | Age: 63
DRG: 235 | End: 2023-10-11
Payer: COMMERCIAL

## 2023-10-11 ENCOUNTER — ANESTHESIA (OUTPATIENT)
Dept: MEDSURG UNIT | Facility: HOSPITAL | Age: 63
DRG: 235 | End: 2023-10-11
Payer: COMMERCIAL

## 2023-10-11 DIAGNOSIS — Z91.89 AT HIGH RISK FOR BLEEDING: ICD-10-CM

## 2023-10-11 DIAGNOSIS — Z01.818 PRE-OP TESTING: ICD-10-CM

## 2023-10-11 DIAGNOSIS — J90 PLEURAL EFFUSION: ICD-10-CM

## 2023-10-11 DIAGNOSIS — I25.10 CAD (CORONARY ARTERY DISEASE), NATIVE CORONARY ARTERY: Primary | ICD-10-CM

## 2023-10-11 PROBLEM — E78.5 HYPERLIPIDEMIA: Status: ACTIVE | Noted: 2017-06-09

## 2023-10-11 PROBLEM — E66.01 CLASS 2 SEVERE OBESITY WITH SERIOUS COMORBIDITY AND BODY MASS INDEX (BMI) OF 39.0 TO 39.9 IN ADULT (HCC): Status: ACTIVE | Noted: 2023-10-11

## 2023-10-11 PROBLEM — E11.9 TYPE 2 DIABETES MELLITUS WITHOUT COMPLICATION (HCC): Status: ACTIVE | Noted: 2023-10-11

## 2023-10-11 PROBLEM — E66.812 CLASS 2 SEVERE OBESITY WITH SERIOUS COMORBIDITY AND BODY MASS INDEX (BMI) OF 39.0 TO 39.9 IN ADULT (HCC): Status: ACTIVE | Noted: 2023-10-11

## 2023-10-11 PROBLEM — E66.01 CLASS 2 SEVERE OBESITY WITH SERIOUS COMORBIDITY AND BODY MASS INDEX (BMI) OF 39.0 TO 39.9 IN ADULT  (HCC): Status: ACTIVE | Noted: 2023-10-11

## 2023-10-11 PROBLEM — E66.01 CLASS 2 SEVERE OBESITY WITH SERIOUS COMORBIDITY AND BODY MASS INDEX (BMI) OF 39.0 TO 39.9 IN ADULT: Status: ACTIVE | Noted: 2023-10-11

## 2023-10-11 LAB
APTT PPP: 28.8 SECONDS (ref 23.3–35.6)
ARTERIAL PATENCY WRIST A: POSITIVE
BASE EXCESS BLD CALC-SCNC: -1 MMOL/L
BASE EXCESS BLDA CALC-SCNC: -1.8 MMOL/L (ref ?–2)
BASE EXCESS BLDA CALC-SCNC: -2.2 MMOL/L (ref ?–2)
BASE EXCESS BLDA CALC-SCNC: -2.9 MMOL/L (ref ?–2)
BASE EXCESS BLDMV CALC-SCNC: -1 MMOL/L (ref ?–30)
BASE EXCESS BLDMV CALC-SCNC: -1 MMOL/L (ref ?–30)
BASE EXCESS BLDMV CALC-SCNC: 0 MMOL/L (ref ?–30)
BODY TEMPERATURE: 98.6 F
BUN BLD-MCNC: 16 MG/DL (ref 7–18)
CA-I BLD-SCNC: 1.23 MMOL/L (ref 1.12–1.32)
CA-I BLDMV-SCNC: 1.16 MMOL/L (ref 1.12–1.32)
CA-I BLDMV-SCNC: 1.17 MMOL/L (ref 1.12–1.32)
CA-I BLDMV-SCNC: 1.18 MMOL/L (ref 1.12–1.32)
CALCIUM BLD-MCNC: 8.5 MG/DL (ref 8.5–10.1)
CHLORIDE SERPL-SCNC: 109 MMOL/L (ref 98–112)
CO2 BLD-SCNC: 26 MMOL/L (ref 22–32)
CO2 BLDMV-SCNC: 25 MMOL/L (ref 22–32)
CO2 BLDMV-SCNC: 27 MMOL/L (ref 22–32)
CO2 BLDMV-SCNC: 28 MMOL/L (ref 22–32)
CO2 SERPL-SCNC: 24 MMOL/L (ref 21–32)
COHGB MFR BLD: 1.4 % SAT (ref 0–3)
COHGB MFR BLD: 1.7 % SAT (ref 0–3)
COHGB MFR BLD: 1.8 % SAT (ref 0–3)
CREAT BLD-MCNC: 1.45 MG/DL
EGFRCR SERPLBLD CKD-EPI 2021: 41 ML/MIN/1.73M2 (ref 60–?)
ERYTHROCYTE [DISTWIDTH] IN BLOOD BY AUTOMATED COUNT: 13.2 %
FIBRINOGEN PPP-MCNC: 314 MG/DL (ref 180–480)
FIO2: 80 %
GLUCOSE BLD-MCNC: 120 MG/DL (ref 70–99)
GLUCOSE BLD-MCNC: 141 MG/DL (ref 70–99)
GLUCOSE BLD-MCNC: 158 MG/DL (ref 70–99)
GLUCOSE BLD-MCNC: 160 MG/DL (ref 70–99)
GLUCOSE BLD-MCNC: 165 MG/DL (ref 70–99)
GLUCOSE BLD-MCNC: 166 MG/DL (ref 70–99)
GLUCOSE BLD-MCNC: 167 MG/DL (ref 70–99)
GLUCOSE BLD-MCNC: 176 MG/DL (ref 70–99)
GLUCOSE BLD-MCNC: 176 MG/DL (ref 70–99)
GLUCOSE BLD-MCNC: 177 MG/DL (ref 70–99)
GLUCOSE BLD-MCNC: 181 MG/DL (ref 70–99)
GLUCOSE BLD-MCNC: 187 MG/DL (ref 70–99)
GLUCOSE BLD-MCNC: 188 MG/DL (ref 70–99)
GLUCOSE BLD-MCNC: 202 MG/DL (ref 70–99)
GLUCOSE BLD-MCNC: 233 MG/DL (ref 70–99)
GLUCOSE BLD-MCNC: 238 MG/DL (ref 70–99)
HCO3 BLD-SCNC: 24.6 MEQ/L
HCO3 BLDA-SCNC: 22.6 MEQ/L (ref 21–27)
HCO3 BLDA-SCNC: 23.2 MEQ/L (ref 21–27)
HCO3 BLDA-SCNC: 23.5 MEQ/L (ref 21–27)
HCO3 BLDMV-SCNC: 24.1 MEQ/L (ref 22–26)
HCO3 BLDMV-SCNC: 25.1 MEQ/L (ref 22–26)
HCO3 BLDMV-SCNC: 26.2 MEQ/L (ref 22–26)
HCT VFR BLD AUTO: 37.7 %
HCT VFR BLD CALC: 35 %
HCT VFR BLDMV CALC: 25 %
HCT VFR BLDMV CALC: 26 %
HCT VFR BLDMV CALC: 27 %
HGB BLD-MCNC: 12.8 G/DL
HGB BLD-MCNC: 13.2 G/DL
HGB BLD-MCNC: 13.6 G/DL
HGB BLD-MCNC: 13.8 G/DL
INR BLD: 1.22 (ref 0.85–1.16)
INSPIRATION SETTING TIME VENT: 0.9 %
ISTAT ACTIVATED CLOTTING TIME: 131 SECONDS (ref 74–137)
ISTAT ACTIVATED CLOTTING TIME: 396 SECONDS (ref 74–137)
ISTAT ACTIVATED CLOTTING TIME: 420 SECONDS (ref 74–137)
ISTAT ACTIVATED CLOTTING TIME: 498 SECONDS (ref 74–137)
ISTAT ACTIVATED CLOTTING TIME: 510 SECONDS (ref 74–137)
ISTAT PATIENT TEMPERATURE: 32 DEGREE
ISTAT PATIENT TEMPERATURE: 32 DEGREE
ISTAT PATIENT TEMPERATURE: 36 DEGREE
MAGNESIUM SERPL-MCNC: 2.5 MG/DL (ref 1.6–2.6)
MCH RBC QN AUTO: 28.8 PG (ref 26–34)
MCHC RBC AUTO-ENTMCNC: 34 G/DL (ref 31–37)
MCV RBC AUTO: 84.7 FL
METHGB MFR BLD: 0 % SAT (ref 0.4–1.5)
METHGB MFR BLD: 0.3 % SAT (ref 0.4–1.5)
METHGB MFR BLD: 0.8 % SAT (ref 0.4–1.5)
MRSA DNA SPEC QL NAA+PROBE: NEGATIVE
OXYHGB MFR BLDA: 95 % (ref 92–100)
OXYHGB MFR BLDA: 95.6 % (ref 92–100)
OXYHGB MFR BLDA: 96 % (ref 92–100)
PCO2 BLD: 47.1 MMHG
PCO2 BLDA: 41 MM HG (ref 35–45)
PCO2 BLDA: 41 MM HG (ref 35–45)
PCO2 BLDA: 42 MM HG (ref 35–45)
PCO2 BLDMV: 38.9 MMHG (ref 38–50)
PCO2 BLDMV: 39 MMHG (ref 38–50)
PCO2 BLDMV: 44.6 MMHG (ref 38–50)
PEEP: 7 CM H2O
PH BLD: 7.33 [PH]
PH BLDA: 7.35 [PH] (ref 7.35–7.45)
PH BLDA: 7.36 [PH] (ref 7.35–7.45)
PH BLDA: 7.36 [PH] (ref 7.35–7.45)
PH BLDMV: 7.35 [PH] (ref 7.32–7.43)
PH BLDMV: 7.39 [PH] (ref 7.32–7.43)
PH BLDMV: 7.4 [PH] (ref 7.32–7.43)
PLATELET # BLD AUTO: 196 10(3)UL (ref 150–450)
PO2 BLD: 239 MMHG
PO2 BLDA: 74 MM HG (ref 80–100)
PO2 BLDA: 79 MM HG (ref 80–100)
PO2 BLDA: 82 MM HG (ref 80–100)
PO2 BLDMV: <70 MMHG (ref 35–40)
POTASSIUM BLD-SCNC: 3.6 MMOL/L (ref 3.6–5.1)
POTASSIUM SERPL-SCNC: 3.8 MMOL/L (ref 3.5–5.1)
PROTHROMBIN TIME: 15.4 SECONDS (ref 11.6–14.8)
RBC # BLD AUTO: 4.45 X10(6)UL
SAO2 % BLD: 100 %
SAO2 % BLDMV: 77 % (ref 60–85)
SAO2 % BLDMV: 78 % (ref 60–85)
SAO2 % BLDMV: 81 % (ref 60–85)
SODIUM BLD-SCNC: 141 MMOL/L (ref 136–145)
SODIUM BLDMV-SCNC: 130 MMOL/L (ref 136–145)
SODIUM BLDMV-SCNC: 133 MMOL/L (ref 136–145)
SODIUM BLDMV-SCNC: 133 MMOL/L (ref 136–145)
SODIUM BLDMV-SCNC: 5.7 MMOL/L (ref 3.6–5.1)
SODIUM BLDMV-SCNC: 5.8 MMOL/L (ref 3.6–5.1)
SODIUM BLDMV-SCNC: 6.3 MMOL/L (ref 3.6–5.1)
SODIUM SERPL-SCNC: 141 MMOL/L (ref 136–145)
TIDAL VOLUME: 500 ML
VENT RATE: 15 /MIN
WBC # BLD AUTO: 15.6 X10(3) UL (ref 4–11)

## 2023-10-11 PROCEDURE — S0028 INJECTION, FAMOTIDINE, 20 MG: HCPCS | Performed by: ANESTHESIOLOGY

## 2023-10-11 PROCEDURE — 93312 ECHO TRANSESOPHAGEAL: CPT | Performed by: ANESTHESIOLOGY

## 2023-10-11 PROCEDURE — 06BQ4ZZ EXCISION OF LEFT SAPHENOUS VEIN, PERCUTANEOUS ENDOSCOPIC APPROACH: ICD-10-PCS | Performed by: THORACIC SURGERY (CARDIOTHORACIC VASCULAR SURGERY)

## 2023-10-11 PROCEDURE — 99222 1ST HOSP IP/OBS MODERATE 55: CPT | Performed by: HOSPITALIST

## 2023-10-11 PROCEDURE — 71045 X-RAY EXAM CHEST 1 VIEW: CPT | Performed by: PHYSICIAN ASSISTANT

## 2023-10-11 PROCEDURE — 021109W BYPASS CORONARY ARTERY, TWO ARTERIES FROM AORTA WITH AUTOLOGOUS VENOUS TISSUE, OPEN APPROACH: ICD-10-PCS | Performed by: THORACIC SURGERY (CARDIOTHORACIC VASCULAR SURGERY)

## 2023-10-11 PROCEDURE — 02100Z9 BYPASS CORONARY ARTERY, ONE ARTERY FROM LEFT INTERNAL MAMMARY, OPEN APPROACH: ICD-10-PCS | Performed by: THORACIC SURGERY (CARDIOTHORACIC VASCULAR SURGERY)

## 2023-10-11 RX ORDER — FAMOTIDINE 10 MG/ML
INJECTION, SOLUTION INTRAVENOUS AS NEEDED
Status: DISCONTINUED | OUTPATIENT
Start: 2023-10-11 | End: 2023-10-11 | Stop reason: SURG

## 2023-10-11 RX ORDER — DEXTROSE AND SODIUM CHLORIDE 5; .45 G/100ML; G/100ML
INJECTION, SOLUTION INTRAVENOUS CONTINUOUS
Status: DISCONTINUED | OUTPATIENT
Start: 2023-10-11 | End: 2023-10-17

## 2023-10-11 RX ORDER — PROTAMINE SULFATE 10 MG/ML
INJECTION, SOLUTION INTRAVENOUS AS NEEDED
Status: DISCONTINUED | OUTPATIENT
Start: 2023-10-11 | End: 2023-10-11 | Stop reason: SURG

## 2023-10-11 RX ORDER — MELATONIN
3 NIGHTLY PRN
Status: DISCONTINUED | OUTPATIENT
Start: 2023-10-11 | End: 2023-10-17

## 2023-10-11 RX ORDER — ONDANSETRON 2 MG/ML
4 INJECTION INTRAMUSCULAR; INTRAVENOUS EVERY 6 HOURS PRN
Status: DISCONTINUED | OUTPATIENT
Start: 2023-10-11 | End: 2023-10-17

## 2023-10-11 RX ORDER — PANTOPRAZOLE SODIUM 40 MG/1
40 TABLET, DELAYED RELEASE ORAL
Status: DISCONTINUED | OUTPATIENT
Start: 2023-10-11 | End: 2023-10-17

## 2023-10-11 RX ORDER — SENNOSIDES 8.6 MG
17.2 TABLET ORAL NIGHTLY PRN
Status: DISCONTINUED | OUTPATIENT
Start: 2023-10-11 | End: 2023-10-17

## 2023-10-11 RX ORDER — NICOTINE POLACRILEX 4 MG
30 LOZENGE BUCCAL
Status: DISCONTINUED | OUTPATIENT
Start: 2023-10-11 | End: 2023-10-17

## 2023-10-11 RX ORDER — CHLORHEXIDINE GLUCONATE ORAL RINSE 1.2 MG/ML
15 SOLUTION DENTAL
Status: DISCONTINUED | OUTPATIENT
Start: 2023-10-11 | End: 2023-10-14

## 2023-10-11 RX ORDER — METHYLPREDNISOLONE SODIUM SUCCINATE 500 MG/8ML
INJECTION INTRAMUSCULAR; INTRAVENOUS AS NEEDED
Status: DISCONTINUED | OUTPATIENT
Start: 2023-10-11 | End: 2023-10-11 | Stop reason: SURG

## 2023-10-11 RX ORDER — MORPHINE SULFATE 2 MG/ML
2 INJECTION, SOLUTION INTRAMUSCULAR; INTRAVENOUS EVERY 2 HOUR PRN
Status: DISCONTINUED | OUTPATIENT
Start: 2023-10-11 | End: 2023-10-17

## 2023-10-11 RX ORDER — MIDAZOLAM HYDROCHLORIDE 1 MG/ML
1 INJECTION INTRAMUSCULAR; INTRAVENOUS EVERY 5 MIN PRN
Status: DISCONTINUED | OUTPATIENT
Start: 2023-10-11 | End: 2023-10-17

## 2023-10-11 RX ORDER — DOBUTAMINE HYDROCHLORIDE 200 MG/100ML
INJECTION INTRAVENOUS CONTINUOUS PRN
Status: DISCONTINUED | OUTPATIENT
Start: 2023-10-11 | End: 2023-10-11

## 2023-10-11 RX ORDER — ENEMA 19; 7 G/133ML; G/133ML
1 ENEMA RECTAL ONCE AS NEEDED
Status: DISCONTINUED | OUTPATIENT
Start: 2023-10-11 | End: 2023-10-17

## 2023-10-11 RX ORDER — DIPHENHYDRAMINE HYDROCHLORIDE 50 MG/ML
12.5 INJECTION INTRAMUSCULAR; INTRAVENOUS EVERY 4 HOURS PRN
Status: DISCONTINUED | OUTPATIENT
Start: 2023-10-11 | End: 2023-10-12

## 2023-10-11 RX ORDER — NITROGLYCERIN 20 MG/100ML
INJECTION INTRAVENOUS CONTINUOUS PRN
Status: DISCONTINUED | OUTPATIENT
Start: 2023-10-11 | End: 2023-10-17

## 2023-10-11 RX ORDER — BISACODYL 10 MG
10 SUPPOSITORY, RECTAL RECTAL
Status: DISCONTINUED | OUTPATIENT
Start: 2023-10-11 | End: 2023-10-17

## 2023-10-11 RX ORDER — DEXMEDETOMIDINE HYDROCHLORIDE 4 UG/ML
INJECTION, SOLUTION INTRAVENOUS CONTINUOUS
Status: DISCONTINUED | OUTPATIENT
Start: 2023-10-11 | End: 2023-10-17

## 2023-10-11 RX ORDER — SODIUM CHLORIDE 9 MG/ML
INJECTION, SOLUTION INTRAVENOUS CONTINUOUS
Status: DISCONTINUED | OUTPATIENT
Start: 2023-10-11 | End: 2023-10-12

## 2023-10-11 RX ORDER — NICOTINE POLACRILEX 4 MG
15 LOZENGE BUCCAL
Status: DISCONTINUED | OUTPATIENT
Start: 2023-10-11 | End: 2023-10-17

## 2023-10-11 RX ORDER — POTASSIUM CHLORIDE 29.8 MG/ML
40 INJECTION INTRAVENOUS AS NEEDED
Status: DISCONTINUED | OUTPATIENT
Start: 2023-10-11 | End: 2023-10-17

## 2023-10-11 RX ORDER — NALOXONE HYDROCHLORIDE 0.4 MG/ML
0.08 INJECTION, SOLUTION INTRAMUSCULAR; INTRAVENOUS; SUBCUTANEOUS
Status: DISCONTINUED | OUTPATIENT
Start: 2023-10-11 | End: 2023-10-12

## 2023-10-11 RX ORDER — DOBUTAMINE HYDROCHLORIDE 200 MG/100ML
INJECTION INTRAVENOUS CONTINUOUS PRN
Status: DISCONTINUED | OUTPATIENT
Start: 2023-10-11 | End: 2023-10-17

## 2023-10-11 RX ORDER — SODIUM CHLORIDE 9 MG/ML
INJECTION, SOLUTION INTRAVENOUS CONTINUOUS
Status: DISCONTINUED | OUTPATIENT
Start: 2023-10-11 | End: 2023-10-17

## 2023-10-11 RX ORDER — DEXMEDETOMIDINE HYDROCHLORIDE 4 UG/ML
INJECTION, SOLUTION INTRAVENOUS CONTINUOUS PRN
Status: DISCONTINUED | OUTPATIENT
Start: 2023-10-11 | End: 2023-10-11 | Stop reason: SURG

## 2023-10-11 RX ORDER — LIDOCAINE HYDROCHLORIDE 10 MG/ML
INJECTION, SOLUTION EPIDURAL; INFILTRATION; INTRACAUDAL; PERINEURAL AS NEEDED
Status: DISCONTINUED | OUTPATIENT
Start: 2023-10-11 | End: 2023-10-11 | Stop reason: SURG

## 2023-10-11 RX ORDER — ACETAMINOPHEN 10 MG/ML
1000 INJECTION, SOLUTION INTRAVENOUS EVERY 6 HOURS
Qty: 600 ML | Refills: 0 | Status: DISPENSED | OUTPATIENT
Start: 2023-10-11 | End: 2023-10-12

## 2023-10-11 RX ORDER — KETAMINE HYDROCHLORIDE 50 MG/ML
INJECTION, SOLUTION, CONCENTRATE INTRAMUSCULAR; INTRAVENOUS AS NEEDED
Status: DISCONTINUED | OUTPATIENT
Start: 2023-10-11 | End: 2023-10-11 | Stop reason: SURG

## 2023-10-11 RX ORDER — IPRATROPIUM BROMIDE AND ALBUTEROL SULFATE 2.5; .5 MG/3ML; MG/3ML
3 SOLUTION RESPIRATORY (INHALATION) EVERY 4 HOURS
Status: DISCONTINUED | OUTPATIENT
Start: 2023-10-11 | End: 2023-10-12

## 2023-10-11 RX ORDER — ALBUTEROL SULFATE 90 UG/1
AEROSOL, METERED RESPIRATORY (INHALATION) AS NEEDED
Status: DISCONTINUED | OUTPATIENT
Start: 2023-10-11 | End: 2023-10-11 | Stop reason: SURG

## 2023-10-11 RX ORDER — HEPARIN SODIUM 1000 [USP'U]/ML
INJECTION, SOLUTION INTRAVENOUS; SUBCUTANEOUS AS NEEDED
Status: DISCONTINUED | OUTPATIENT
Start: 2023-10-11 | End: 2023-10-11 | Stop reason: SURG

## 2023-10-11 RX ORDER — VANCOMYCIN HYDROCHLORIDE
15 EVERY 12 HOURS
Qty: 500 ML | Refills: 0 | Status: COMPLETED | OUTPATIENT
Start: 2023-10-11 | End: 2023-10-12

## 2023-10-11 RX ORDER — CEFAZOLIN SODIUM 1 G/3ML
INJECTION, POWDER, FOR SOLUTION INTRAMUSCULAR; INTRAVENOUS AS NEEDED
Status: DISCONTINUED | OUTPATIENT
Start: 2023-10-11 | End: 2023-10-11 | Stop reason: SURG

## 2023-10-11 RX ORDER — CEFAZOLIN SODIUM/WATER 2 G/20 ML
2 SYRINGE (ML) INTRAVENOUS
Status: COMPLETED | OUTPATIENT
Start: 2023-10-12 | End: 2023-10-11

## 2023-10-11 RX ORDER — CEFAZOLIN SODIUM/WATER 2 G/20 ML
2 SYRINGE (ML) INTRAVENOUS EVERY 8 HOURS
Qty: 100 ML | Refills: 0 | Status: COMPLETED | OUTPATIENT
Start: 2023-10-11 | End: 2023-10-12

## 2023-10-11 RX ORDER — MORPHINE SULFATE 4 MG/ML
4 INJECTION, SOLUTION INTRAMUSCULAR; INTRAVENOUS EVERY 2 HOUR PRN
Status: DISCONTINUED | OUTPATIENT
Start: 2023-10-11 | End: 2023-10-17

## 2023-10-11 RX ORDER — MAGNESIUM SULFATE 1 G/100ML
1 INJECTION INTRAVENOUS AS NEEDED
Status: DISCONTINUED | OUTPATIENT
Start: 2023-10-11 | End: 2023-10-17

## 2023-10-11 RX ORDER — ALBUMIN, HUMAN INJ 5% 5 %
12.5 SOLUTION INTRAVENOUS ONCE AS NEEDED
Status: DISCONTINUED | OUTPATIENT
Start: 2023-10-11 | End: 2023-10-17

## 2023-10-11 RX ORDER — POLYETHYLENE GLYCOL 3350 17 G/17G
17 POWDER, FOR SOLUTION ORAL DAILY PRN
Status: DISCONTINUED | OUTPATIENT
Start: 2023-10-11 | End: 2023-10-17

## 2023-10-11 RX ORDER — ASPIRIN 81 MG/1
81 TABLET ORAL DAILY
Status: DISCONTINUED | OUTPATIENT
Start: 2023-10-12 | End: 2023-10-12

## 2023-10-11 RX ORDER — MAGNESIUM SULFATE HEPTAHYDRATE 40 MG/ML
2 INJECTION, SOLUTION INTRAVENOUS AS NEEDED
Status: DISCONTINUED | OUTPATIENT
Start: 2023-10-11 | End: 2023-10-17

## 2023-10-11 RX ORDER — DEXTROSE MONOHYDRATE 25 G/50ML
50 INJECTION, SOLUTION INTRAVENOUS
Status: DISCONTINUED | OUTPATIENT
Start: 2023-10-11 | End: 2023-10-17

## 2023-10-11 RX ORDER — DIPHENHYDRAMINE HYDROCHLORIDE 50 MG/ML
INJECTION INTRAMUSCULAR; INTRAVENOUS AS NEEDED
Status: DISCONTINUED | OUTPATIENT
Start: 2023-10-11 | End: 2023-10-11 | Stop reason: SURG

## 2023-10-11 RX ORDER — POTASSIUM CHLORIDE 14.9 MG/ML
20 INJECTION INTRAVENOUS AS NEEDED
Status: DISCONTINUED | OUTPATIENT
Start: 2023-10-11 | End: 2023-10-17

## 2023-10-11 RX ORDER — MIDAZOLAM HYDROCHLORIDE 1 MG/ML
INJECTION INTRAMUSCULAR; INTRAVENOUS AS NEEDED
Status: DISCONTINUED | OUTPATIENT
Start: 2023-10-11 | End: 2023-10-11 | Stop reason: SURG

## 2023-10-11 RX ORDER — ATORVASTATIN CALCIUM 20 MG/1
20 TABLET, FILM COATED ORAL NIGHTLY
Status: DISCONTINUED | OUTPATIENT
Start: 2023-10-11 | End: 2023-10-12

## 2023-10-11 RX ORDER — ROCURONIUM BROMIDE 10 MG/ML
INJECTION, SOLUTION INTRAVENOUS AS NEEDED
Status: DISCONTINUED | OUTPATIENT
Start: 2023-10-11 | End: 2023-10-11 | Stop reason: SURG

## 2023-10-11 RX ADMIN — ALBUTEROL SULFATE 8 PUFF: 90 AEROSOL, METERED RESPIRATORY (INHALATION) at 10:51:00

## 2023-10-11 RX ADMIN — DEXMEDETOMIDINE HYDROCHLORIDE 0.5 MCG/KG/HR: 4 INJECTION, SOLUTION INTRAVENOUS at 07:12:00

## 2023-10-11 RX ADMIN — LIDOCAINE HYDROCHLORIDE 100 MG: 10 INJECTION, SOLUTION EPIDURAL; INFILTRATION; INTRACAUDAL; PERINEURAL at 06:52:00

## 2023-10-11 RX ADMIN — FAMOTIDINE 20 MG: 10 INJECTION, SOLUTION INTRAVENOUS at 09:59:00

## 2023-10-11 RX ADMIN — KETAMINE HYDROCHLORIDE 50 MG: 50 INJECTION, SOLUTION, CONCENTRATE INTRAMUSCULAR; INTRAVENOUS at 06:51:00

## 2023-10-11 RX ADMIN — ALBUTEROL SULFATE 8 PUFF: 90 AEROSOL, METERED RESPIRATORY (INHALATION) at 10:23:00

## 2023-10-11 RX ADMIN — ROCURONIUM BROMIDE 100 MG: 10 INJECTION, SOLUTION INTRAVENOUS at 06:52:00

## 2023-10-11 RX ADMIN — ROCURONIUM BROMIDE 50 MG: 10 INJECTION, SOLUTION INTRAVENOUS at 08:59:00

## 2023-10-11 RX ADMIN — DIPHENHYDRAMINE HYDROCHLORIDE 50 MG: 50 INJECTION INTRAMUSCULAR; INTRAVENOUS at 08:13:00

## 2023-10-11 RX ADMIN — ROCURONIUM BROMIDE 50 MG: 10 INJECTION, SOLUTION INTRAVENOUS at 08:13:00

## 2023-10-11 RX ADMIN — MIDAZOLAM HYDROCHLORIDE 2 MG: 1 INJECTION INTRAMUSCULAR; INTRAVENOUS at 06:51:00

## 2023-10-11 RX ADMIN — METHYLPREDNISOLONE SODIUM SUCCINATE 500 MG: 500 INJECTION INTRAMUSCULAR; INTRAVENOUS at 10:36:00

## 2023-10-11 RX ADMIN — PROTAMINE SULFATE 250 MG: 10 INJECTION, SOLUTION INTRAVENOUS at 10:11:00

## 2023-10-11 RX ADMIN — HEPARIN SODIUM 5000 UNITS: 1000 INJECTION, SOLUTION INTRAVENOUS; SUBCUTANEOUS at 08:13:00

## 2023-10-11 RX ADMIN — MIDAZOLAM HYDROCHLORIDE 2 MG: 1 INJECTION INTRAMUSCULAR; INTRAVENOUS at 09:59:00

## 2023-10-11 RX ADMIN — DOBUTAMINE HYDROCHLORIDE 5 MCG/KG/MIN: 200 INJECTION INTRAVENOUS at 10:00:00

## 2023-10-11 RX ADMIN — ROCURONIUM BROMIDE 50 MG: 10 INJECTION, SOLUTION INTRAVENOUS at 09:59:00

## 2023-10-11 RX ADMIN — HEPARIN SODIUM 30000 UNITS: 1000 INJECTION, SOLUTION INTRAVENOUS; SUBCUTANEOUS at 08:41:00

## 2023-10-11 RX ADMIN — CEFAZOLIN SODIUM/WATER 2 G: 2 G/20 ML SYRINGE (ML) INTRAVENOUS at 07:14:00

## 2023-10-11 RX ADMIN — CEFAZOLIN SODIUM 2 G: 1 INJECTION, POWDER, FOR SOLUTION INTRAMUSCULAR; INTRAVENOUS at 10:23:00

## 2023-10-11 NOTE — DIETARY NOTE
Clinical Nutrition     Dietitian consult received per cardiac rehab standing order. Pt to be educated by cardiac rehab staff and encouraged to attend outpatient classes taught by NOEMÍ. NOEMÍ available PRN.     Becca Desai RD, LDN, [address] [place]   Clinical Dietitian  Phone [phone]

## 2023-10-11 NOTE — ANESTHESIA PROCEDURE NOTES
Arterial Line    Performed by: Deepti Herrera MD  Authorized by: Deepti Herrera MD    General Information and Staff    Procedure Start:   Anesthesiologist:  Deepti Herrera MD  Performed By:  Anesthesiologist  Patient Location:  OR  Indication: continuous blood pressure monitoring and blood sampling needed    Site Identification: real time ultrasound guided    Preanesthetic Checklist: 2 patient identifiers, IV checked, risks and benefits discussed, monitors and equipment checked, pre-op evaluation, timeout performed, anesthesia consent and sterile technique used    Procedure Details    Catheter Size:  20 G  Catheter Length:  1 and 3/4 inch  Catheter Type:  Arrow  Seldinger Technique?: Yes    Laterality:  Left  Site:  Radial artery  Site Prep: chlorhexidine    Line Secured:  Tape and Tegaderm    Assessment    Events: patient tolerated procedure well with no complications      Medications      Additional Comments

## 2023-10-11 NOTE — DISCHARGE INSTRUCTIONS
To access the Dr. Adri Galindo discharge instructions video on your home computer:   [email or web address]    Remove steri strips from all incisions on 10/25    [facility] @ discharge  [address]: [phone]  F: [phone]

## 2023-10-11 NOTE — ANESTHESIA PROCEDURE NOTES
Procedure Performed: OLIVIA       Start Time:        End Time:      Preanesthesia Checklist:  Patient identified, IV assessed, risks and benefits discussed, monitors and equipment assessed, procedure being performed at surgeon's request and anesthesia consent obtained. General Procedure Information  Diagnostic Indications for Echo:  assessment of ascending aorta  Location performed:  OR  Intubated  Bite block placed  Heart visualized  Probe Insertion:  Easy  Probe Type:  Multiplane  Modalities:  2D only, color flow mapping, pulse wave Doppler and continuous wave Doppler    Echocardiographic and Doppler Measurements    Ventricles    Right Ventricle:  Global function moderately impaired. Ejection Fraction 40%. Left Ventricle:  Cavity size normal.      Ventricular Regional Function:  1- Basal Anteroseptal:  normal  2- Basal Anterior:  normal  3- Basal Anterolateral:  normal  4- Basal Inferolateral:  normal  5- Basal Inferior:  normal  6- Basal Inferoseptal:  normal  7- Mid Anteroseptal:  normal  8- Mid Anterior:  normal  9- Mid Anterolateral:  normal  10- Mid Inferolateral:  normal  11- Mid Inferior:  normal  12- Mid Inferoseptal:  normal  13- Apical Anterior:  normal  14- Apical Lateral:  normal  15- Apical Inferior:  normal  16- Apical Septal:  normal  17- [place]:  normal      Valves    Aortic Valve: Annulus normal.  Stenosis not present. Mean Gradient: 3 mmHg. Regurgitation absent. Mitral Valve: Annulus dilated. Stenosis not present. Regurgitation +1. Leaflets normal.  Leaflet motions normal.      Tricuspid Valve: Annulus normal.  Leaflets normal.        Aorta    Ascending Aorta:  Size normal.  Dissection not present. Descending Aorta:  Dissection not present. Atria    Right Atrium:  Size normal.  Spontaneous echo contrast not present. Thrombus not present. Tumor not present. Device not present. Left Atrium:  Size normal.  Spontaneous echo contrast not present. Thrombus not present. Tumor not present. Device not present.     Left atrial appendage normal.              Other Findings  Pericardium:  normal  Pleural Effusion:  none  Pulmonary Arteries:  normal      Anesthesia Information  Performed Personally  Anesthesiologist:  Mina Fung MD      Post    Ventricular FXN:  Global FXN: Improved   Regional FXN: Improved                  Complications:None

## 2023-10-11 NOTE — ANESTHESIA PROCEDURE NOTES
Airway  Date/Time: 10/11/2023 6:53 AM  Urgency: elective      General Information and Staff    Patient location during procedure: OR  Anesthesiologist: Laura Nye MD  Performed: anesthesiologist   Performed by: Laura Nye MD  Authorized by: Laura Nye MD      Indications and Patient Condition  Indications for airway management: anesthesia  Sedation level: deep  Preoxygenated: yes  Patient position: sniffing  Mask difficulty assessment: 1 - vent by mask    Final Airway Details  Final airway type: endotracheal airway      Successful airway: ETT  Cuffed: yes   Successful intubation technique: direct laryngoscopy  Endotracheal tube insertion site: oral  Blade: [place]  Blade size: #3  ETT size (mm): 8.0    Cormack-Lehane Classification: grade I - full view of glottis  Placement verified by: capnometry   Measured from: lips  Number of attempts at approach: 1

## 2023-10-11 NOTE — CM/SW NOTE
Patient seen in [facility]Residential [address] 80 is out of [facility] referral sent to [facility], [place] [facility] Surveyor, [facility]. able to accept pt--reserved in [address] @ discharge  [address]: [phone]  F: [phone]

## 2023-10-11 NOTE — OPERATIVE REPORT
Operative Report     Patient Name: Eduardo Marroquin Dx: CAD     Post-Op Dx: same     Procedure: CABG x 3; LIMA to LAD, SVG to OM, SVG to rPDA     Surgeon: TAMI Morton MD     [facility] Emelia Sullivan MD     Anesthesia: Cardiac     Complications: none     Specimen: none     Implants: none     Drains: 36 Fr CT x 2     EBL: ~800cc     Dispo: critical but stable on transfer

## 2023-10-11 NOTE — ANESTHESIA PROCEDURE NOTES
Central Line    Performed by: Derik Caba MD  Authorized by: Derik Caba MD    General Information and Staff    Procedure Start:   Anesthesiologist:  Derik Caba MD  Performed by:   Anesthesiologist  Patient Location:  OR  Indication: central venous access and CVP monitoring    Site Identification: real time ultrasound guided    Preanesthetic Checklist: 2 patient identifiers, IV checked, risks and benefits discussed, monitors and equipment checked, pre-op evaluation, timeout performed, anesthesia consent and sterile technique used    Procedure Detail    Patient Position:  Trendelenburg  Laterality:  Right  Site:  Internal jugular  Prep:  Chloraprep  Catheter Size:  9 Fr  Catheter Type:  MAC introducer  Number of Lumens:  Double lumen  Oximetric Catheter?: Yes    Procedure Detail: target vein identified, needle advanced into vein and blood aspirated and guidewire advanced into vein    Seldinger Technique?: Yes    Intravenous Verification: verified by ultrasound and venous blood return    Post Insertion: all ports aspirated, all ports flushed easily, guidewire was removed intact, line was sutured in place and dressing was applied      Assessment    Events: patient tolerated procedure well with no complications      PA Catheter Placement    PA Catheter Placed?: Yes    PA Catheter Type:  Oximetric  PA Catheter Size:  8  Laterality:  Right  Site:  Internal jugular  Placement Confirmation: pressure tracing changes and verified by OLIVIA    PA Catheter Depth (cm):  48  Events: patient tolerated procedure well with no complications      Additional Comments

## 2023-10-11 NOTE — ANESTHESIA POSTPROCEDURE EVALUATION
[address] Patient Status:  Inpatient   Age/Gender 63year old female MRN [id number]   [facility] 6NE-A Attending Martha Yancey MD   Hosp Day # 0 PCP Jerome Vaughn MD       Anesthesia Post-op Note    CORONARY ARTERY BYPASS GRAFT x 3, LIMA-LAD, SVG-OM, SVG-RCA. INTRAOPERATIVE TRANSESOPHAGEAL ECHOCARDIOGRAM. TAKEDOWN OF LEFT INTERNAL MAMMARY ARTERY. ENDOSCOPIC HARVEST OF LEFT SAPHENOUS VEIN, STERNALOCK PLATES    Procedure Summary       Date: 10/11/23 Room / Location: [address] [place] [address] 01 / [address] [facility]    Anesthesia Start: 6979 Anesthesia Stop: 0984    Procedure: CORONARY ARTERY BYPASS GRAFT x 3, LIMA-LAD, SVG-OM, SVG-RCA. INTRAOPERATIVE TRANSESOPHAGEAL ECHOCARDIOGRAM. TAKEDOWN OF LEFT INTERNAL MAMMARY ARTERY. ENDOSCOPIC HARVEST OF LEFT SAPHENOUS VEIN, STERNALOCK PLATES Diagnosis: (CORONARY ARTERY DISEASE)    Surgeons: Martha Yancey MD Anesthesiologist: Deepti Herrera MD    Anesthesia Type: general ASA Status: 3            Anesthesia Type: general    Vitals Value Taken Time   /78 10/11/23 1212   Temp 97.3 10/11/23 1212   Pulse 85 10/11/23 1212   Resp 14    SpO2 94 10/11/23 1212       Patient Location: ICU    Anesthesia Type: general    Airway Patency: patent    Postop Pain Control: sedated until time of extubation    Mental Status: mildly sedated but able to meaningfully participate in the post-anesthesia evaluation    Nausea/Vomiting: none    Cardiopulmonary/Hydration status: stable euvolemic    Complications: no apparent anesthesia related complications    Postop vital signs: stable    Comments: Patient was monitored throughout case with Cerebral oximetry, standard monitors as well a monitors noted in procedure list.  Upon completion of the surgery and satisfactory hemostasis the patient was transferred to a CCU bed and a mobile monitor. The patient was taken intubated with ambu bag and appropriate sedation.   All relevant information about patient, medications, procedure and plan discussed with the nurse as I relinquished care of the patient. Dental Exam: Unchanged from Preop    Patient to be discharged from PACU when criteria met.

## 2023-10-11 NOTE — ADDENDUM NOTE
Addendum  created 10/11/23 1412 by Jett Billy MD    Clinical Note Signed, Intraprocedure Blocks edited

## 2023-10-12 ENCOUNTER — ANESTHESIA EVENT (OUTPATIENT)
Dept: MEDSURG UNIT | Facility: HOSPITAL | Age: 63
End: 2023-10-12
Payer: COMMERCIAL

## 2023-10-12 ENCOUNTER — ANESTHESIA (OUTPATIENT)
Dept: MEDSURG UNIT | Facility: HOSPITAL | Age: 63
End: 2023-10-12
Payer: COMMERCIAL

## 2023-10-12 ENCOUNTER — APPOINTMENT (OUTPATIENT)
Dept: GENERAL RADIOLOGY | Facility: HOSPITAL | Age: 63
DRG: 235 | End: 2023-10-12
Attending: PHYSICIAN ASSISTANT
Payer: COMMERCIAL

## 2023-10-12 PROBLEM — E11.9 DIABETES MELLITUS (HCC): Status: ACTIVE | Noted: 2023-10-11

## 2023-10-12 LAB
ARTERIAL PATENCY WRIST A: POSITIVE
ATRIAL RATE: 73 BPM
ATRIAL RATE: 89 BPM
BASE EXCESS BLD CALC-SCNC: -4 MMOL/L
BASE EXCESS BLDA CALC-SCNC: -0.4 MMOL/L (ref ?–2)
BASE EXCESS BLDA CALC-SCNC: -1.3 MMOL/L (ref ?–2)
BASE EXCESS BLDA CALC-SCNC: -1.8 MMOL/L (ref ?–2)
BASOPHILS # BLD AUTO: 0.03 X10(3) UL (ref 0–0.2)
BASOPHILS NFR BLD AUTO: 0.2 %
BODY TEMPERATURE: 98.6 F
BUN BLD-MCNC: 13 MG/DL (ref 7–18)
CA-I BLD-SCNC: 1.23 MMOL/L (ref 0.95–1.32)
CA-I BLD-SCNC: 1.25 MMOL/L (ref 0.95–1.32)
CA-I BLD-SCNC: 1.29 MMOL/L (ref 1.12–1.32)
CALCIUM BLD-MCNC: 8.8 MG/DL (ref 8.5–10.1)
CHLORIDE SERPL-SCNC: 113 MMOL/L (ref 98–112)
CO2 BLD-SCNC: 24 MMOL/L (ref 22–32)
CO2 SERPL-SCNC: 24 MMOL/L (ref 21–32)
COHGB MFR BLD: 1.3 % SAT (ref 0–3)
COHGB MFR BLD: 1.6 % SAT (ref 0–3)
COHGB MFR BLD: 1.9 % SAT (ref 0–3)
CREAT BLD-MCNC: 1.28 MG/DL
EGFRCR SERPLBLD CKD-EPI 2021: 48 ML/MIN/1.73M2 (ref 60–?)
EOSINOPHIL # BLD AUTO: 0 X10(3) UL (ref 0–0.7)
EOSINOPHIL NFR BLD AUTO: 0 %
ERYTHROCYTE [DISTWIDTH] IN BLOOD BY AUTOMATED COUNT: 13.2 %
FIO2: 50 %
FIO2: 70 %
FIO2: 80 %
GLUCOSE BLD-MCNC: 100 MG/DL (ref 70–99)
GLUCOSE BLD-MCNC: 100 MG/DL (ref 70–99)
GLUCOSE BLD-MCNC: 105 MG/DL (ref 70–99)
GLUCOSE BLD-MCNC: 113 MG/DL (ref 70–99)
GLUCOSE BLD-MCNC: 114 MG/DL (ref 70–99)
GLUCOSE BLD-MCNC: 116 MG/DL (ref 70–99)
GLUCOSE BLD-MCNC: 117 MG/DL (ref 70–99)
GLUCOSE BLD-MCNC: 118 MG/DL (ref 70–99)
GLUCOSE BLD-MCNC: 121 MG/DL (ref 70–99)
GLUCOSE BLD-MCNC: 123 MG/DL (ref 70–99)
GLUCOSE BLD-MCNC: 123 MG/DL (ref 70–99)
GLUCOSE BLD-MCNC: 124 MG/DL (ref 70–99)
GLUCOSE BLD-MCNC: 126 MG/DL (ref 70–99)
GLUCOSE BLD-MCNC: 133 MG/DL (ref 70–99)
GLUCOSE BLD-MCNC: 137 MG/DL (ref 70–99)
GLUCOSE BLD-MCNC: 149 MG/DL (ref 70–99)
GLUCOSE BLD-MCNC: 154 MG/DL (ref 70–99)
GLUCOSE BLD-MCNC: 62 MG/DL (ref 70–99)
HCO3 BLD-SCNC: 22.7 MEQ/L
HCO3 BLDA-SCNC: 23.5 MEQ/L (ref 21–27)
HCO3 BLDA-SCNC: 23.9 MEQ/L (ref 21–27)
HCO3 BLDA-SCNC: 24.6 MEQ/L (ref 21–27)
HCT VFR BLD AUTO: 36.7 %
HCT VFR BLD CALC: 37 %
HGB BLD-MCNC: 12 G/DL
HGB BLD-MCNC: 12.2 G/DL
HGB BLD-MCNC: 12.7 G/DL
HGB BLD-MCNC: 12.8 G/DL
IMM GRANULOCYTES # BLD AUTO: 0.08 X10(3) UL (ref 0–1)
IMM GRANULOCYTES NFR BLD: 0.5 %
LACTATE BLD-SCNC: 1 MMOL/L (ref 0.5–2)
LACTATE BLD-SCNC: 1.2 MMOL/L (ref 0.5–2)
LYMPHOCYTES # BLD AUTO: 0.7 X10(3) UL (ref 1–4)
LYMPHOCYTES NFR BLD AUTO: 4.5 %
MAGNESIUM SERPL-MCNC: 2.3 MG/DL (ref 1.6–2.6)
MCH RBC QN AUTO: 29.3 PG (ref 26–34)
MCHC RBC AUTO-ENTMCNC: 34.6 G/DL (ref 31–37)
MCV RBC AUTO: 84.6 FL
METHGB MFR BLD: 0 % SAT (ref 0.4–1.5)
METHGB MFR BLD: 0.2 % SAT (ref 0.4–1.5)
METHGB MFR BLD: 0.4 % SAT (ref 0.4–1.5)
MONOCYTES # BLD AUTO: 1.37 X10(3) UL (ref 0.1–1)
MONOCYTES NFR BLD AUTO: 8.7 %
NEUTROPHILS # BLD AUTO: 13.55 X10 (3) UL (ref 1.5–7.7)
NEUTROPHILS # BLD AUTO: 13.55 X10(3) UL (ref 1.5–7.7)
NEUTROPHILS NFR BLD AUTO: 86.1 %
OXYHGB MFR BLDA: 95.4 % (ref 92–100)
OXYHGB MFR BLDA: 96.5 % (ref 92–100)
OXYHGB MFR BLDA: 97.1 % (ref 92–100)
P AXIS: 54 DEGREES
P AXIS: 59 DEGREES
P-R INTERVAL: 142 MS
P-R INTERVAL: 156 MS
PCO2 BLD: 49.7 MMHG
PCO2 BLDA: 35 MM HG (ref 35–45)
PCO2 BLDA: 38 MM HG (ref 35–45)
PCO2 BLDA: 39 MM HG (ref 35–45)
PEEP: 10 CM H2O
PEEP: 5 CM H2O
PEEP: 7 CM H2O
PH BLD: 7.27 [PH]
PH BLDA: 7.38 [PH] (ref 7.35–7.45)
PH BLDA: 7.41 [PH] (ref 7.35–7.45)
PH BLDA: 7.42 [PH] (ref 7.35–7.45)
PLATELET # BLD AUTO: 193 10(3)UL (ref 150–450)
PO2 BLD: 136 MMHG
PO2 BLDA: 72 MM HG (ref 80–100)
PO2 BLDA: 77 MM HG (ref 80–100)
PO2 BLDA: 83 MM HG (ref 80–100)
POTASSIUM BLD-SCNC: 3.8 MMOL/L (ref 3.6–5.1)
POTASSIUM BLD-SCNC: 4.7 MMOL/L (ref 3.6–5.1)
POTASSIUM BLD-SCNC: 5.2 MMOL/L (ref 3.6–5.1)
POTASSIUM SERPL-SCNC: 4.6 MMOL/L (ref 3.5–5.1)
PRESSURE SUPPORT: 5 CM H2O
Q-T INTERVAL: 420 MS
Q-T INTERVAL: 438 MS
QRS DURATION: 86 MS
QRS DURATION: 92 MS
QTC CALCULATION (BEZET): 482 MS
QTC CALCULATION (BEZET): 511 MS
R AXIS: -17 DEGREES
R AXIS: -27 DEGREES
RBC # BLD AUTO: 4.34 X10(6)UL
SAO2 % BLD: 99 %
SODIUM BLD-SCNC: 138 MMOL/L (ref 135–145)
SODIUM BLD-SCNC: 138 MMOL/L (ref 135–145)
SODIUM BLD-SCNC: 141 MMOL/L (ref 136–145)
SODIUM SERPL-SCNC: 143 MMOL/L (ref 136–145)
T AXIS: -4 DEGREES
T AXIS: 24 DEGREES
TIDAL VOLUME: 500 ML
TIDAL VOLUME: 500 ML
VENT RATE: 15 /MIN
VENT RATE: 15 /MIN
VENTRICULAR RATE: 73 BPM
VENTRICULAR RATE: 89 BPM
WBC # BLD AUTO: 15.7 X10(3) UL (ref 4–11)

## 2023-10-12 PROCEDURE — 99232 SBSQ HOSP IP/OBS MODERATE 35: CPT | Performed by: HOSPITALIST

## 2023-10-12 PROCEDURE — 71045 X-RAY EXAM CHEST 1 VIEW: CPT | Performed by: PHYSICIAN ASSISTANT

## 2023-10-12 RX ORDER — ASPIRIN 300 MG/1
81 SUPPOSITORY RECTAL DAILY
Status: DISCONTINUED | OUTPATIENT
Start: 2023-10-12 | End: 2023-10-13

## 2023-10-12 RX ORDER — SODIUM CHLORIDE 9 MG/ML
INJECTION, SOLUTION INTRAVENOUS CONTINUOUS
Status: DISCONTINUED | OUTPATIENT
Start: 2023-10-12 | End: 2023-10-17

## 2023-10-12 RX ORDER — ASPIRIN 81 MG/1
81 TABLET, CHEWABLE ORAL DAILY
Status: DISCONTINUED | OUTPATIENT
Start: 2023-10-12 | End: 2023-10-17

## 2023-10-12 RX ORDER — HYDRALAZINE HYDROCHLORIDE 20 MG/ML
10 INJECTION INTRAMUSCULAR; INTRAVENOUS EVERY 4 HOURS PRN
Status: DISCONTINUED | OUTPATIENT
Start: 2023-10-12 | End: 2023-10-17

## 2023-10-12 RX ORDER — NALOXONE HYDROCHLORIDE 0.4 MG/ML
0.08 INJECTION, SOLUTION INTRAMUSCULAR; INTRAVENOUS; SUBCUTANEOUS
Status: DISCONTINUED | OUTPATIENT
Start: 2023-10-12 | End: 2023-10-17

## 2023-10-12 RX ORDER — ROSUVASTATIN CALCIUM 20 MG/1
20 TABLET, COATED ORAL NIGHTLY
Status: DISCONTINUED | OUTPATIENT
Start: 2023-10-12 | End: 2023-10-17

## 2023-10-12 RX ORDER — IPRATROPIUM BROMIDE AND ALBUTEROL SULFATE 2.5; .5 MG/3ML; MG/3ML
3 SOLUTION RESPIRATORY (INHALATION) EVERY 4 HOURS PRN
Status: DISCONTINUED | OUTPATIENT
Start: 2023-10-12 | End: 2023-10-17

## 2023-10-12 RX ORDER — FUROSEMIDE 10 MG/ML
20 INJECTION INTRAMUSCULAR; INTRAVENOUS ONCE
Status: DISCONTINUED | OUTPATIENT
Start: 2023-10-12 | End: 2023-10-13 | Stop reason: DRUGHIGH

## 2023-10-12 RX ORDER — KETOROLAC TROMETHAMINE 15 MG/ML
30 INJECTION, SOLUTION INTRAMUSCULAR; INTRAVENOUS EVERY 6 HOURS PRN
Status: DISPENSED | OUTPATIENT
Start: 2023-10-12 | End: 2023-10-14

## 2023-10-12 NOTE — PHYSICAL THERAPY NOTE
Physical Therapy    Order for PT eval received. Pt remains intubated at this time w/ ongoing post-op medical management. Will re-attempt eval once extubated and more medically stable to participate.

## 2023-10-12 NOTE — PLAN OF CARE
Assumed care of pt around 1930. Intubated and sedation on propofol and precedex. Patient able to wake up and follow commands on sedation holiday. NSR on tele. BP stable. Dobutamine infusing. Left radial katty very positional and not drawing blood- new right radial katty placed by anesthesia- eventually stopped working after a few hours. See flowsheet for chest tube and smallwood outputs. New radial katty placed this AM by Dr Sd Boles.

## 2023-10-12 NOTE — ANESTHESIA PROCEDURE NOTES
Arterial Line    Date/Time: 10/11/2023 9:11 PM    Performed by: Kade Guillen MD  Authorized by: Kade Guillen MD    General Information and Staff    Procedure Start:  10/11/2023 9:11 PM  Procedure End:  10/11/2023 9:13 PM  Anesthesiologist:  Kade Guillen MD  Performed By:  Anesthesiologist  Patient Location:  OR  Indication: continuous blood pressure monitoring and blood sampling needed    Site Identification: surface landmarks    Preanesthetic Checklist: 2 patient identifiers, IV checked, risks and benefits discussed, monitors and equipment checked, pre-op evaluation, timeout performed, anesthesia consent and sterile technique used    Procedure Details    Catheter Size:  20 G  Catheter Length:  1 and 3/4 inch  Catheter Type:  Arrow  Seldinger Technique?: No    Laterality:  Right  Site:  Radial artery  Site Prep: chlorhexidine    Line Secured:  Wrist Brace, tape and Tegaderm    Assessment    Events: patient tolerated procedure well with no complications      Medications  10/11/2023 9:11 PM      Additional Comments

## 2023-10-12 NOTE — OPERATIVE REPORT
[facility]    PATIENT'S NAME: Cassidy Davenport   ATTENDING PHYSICIAN: Iline Schilder, MD   OPERATING PHYSICIAN: Iline Schilder, MD   PATIENT ACCOUNT#:   [id number]    LOCATION:  [address] [place] [address]  MEDICAL RECORD #:   [id number]       YOB: 1960  ADMISSION DATE:       10/11/2023      OPERATION DATE:  10/11/2023    OPERATIVE REPORT    PREOPERATIVE DIAGNOSIS:  Coronary artery disease. POSTOPERATIVE DIAGNOSIS:  Coronary artery disease. PROCEDURE:  Coronary revascularization x3: Left internal mammary artery graft to the left anterior descending; saphenous vein graft to the obtuse marginal and right coronary artery. ASSISTANT SURGEON:  Ajith Siegel MD.    ASSISTANT:  Jasmin Anderson PA-C     ANESTHESIA:  General endotracheal.    BLOOD LOSS:  600 mL. COMPLICATIONS:  None. INDICATIONS:  This patient is a very pleasant middle-aged female patient with known coronary artery disease, having had prior stent to the circumflex. She presents with symptoms prompting further workup including catheterization demonstrating pre-stent stenosis of the circumflex, roughly 70% lesion in the LAD, and a fairly high-grade lesion of the ostium of the smallest right coronary artery. Risks, benefits, and options of surgical correction were discussed. OPERATIVE TECHNIQUE:  Appropriate lines and catheters were placed. General anesthesia induced. Farr catheter was placed in the bladder for drainage. [place] was placed. Transesophageal echo was placed. Transcranial oximetry was placed. Patient prepped and draped. Sternotomy was performed. Left internal mammary artery was taken down. Greater saphenous graft was harvested endoscopically from the left leg. The patient was heparinized and cannulated for cardiopulmonary bypass. On bypass, patient was cooled to 32 degrees centigrade. The aorta was crossclamped. Blood cardioplegia infused to achieve electromechanical arrest of the heart.   Obtuse marginal was bypassed first.  This is a 1.5 mm artery of fairly good quality. A segment of saphenous graft was placed here. Cardioplegia was given, following which the right coronary artery was inspected. This turned out to be a better than expected artery. It was a solid 1.5 mm in diameter of good quality. Segment of saphenous graft was placed here. Cardioplegia was given, following which the left internal mammary was placed to the LAD roughly in the midportion of the LAD. Rewarming was begun while proximal anastomoses for the 2 vein grafts were constructed end-to-side to the aorta. Warm cardioplegia was given. The aorta was unclamped. Heart spontaneously defibrillated. Following complete and thorough rewarming, the patient weaned from bypass without difficulty. Off pump, the patient was decannulated. Protamine was administered. Pacing leads were applied to the heart. Chest was closed with double-stranded wire supplemented with titanium plates for a BMI of 24.5.     Dictated By Stephie Pineda MD  d: 10/11/2023 10:42:54  t: 10/11/2023 16:59:58  [facility] [id number]  /

## 2023-10-12 NOTE — ANESTHESIA PROCEDURE NOTES
Arterial Line    Date/Time: 10/12/2023 6:00 AM    Performed by: Mina Fung MD  Authorized by: Mina Fung MD    General Information and Staff    Procedure Start:  10/12/2023 6:00 AM  Procedure End:  10/12/2023 6:07 AM  Anesthesiologist:  Mina Fung MD  Performed By:  Anesthesiologist  Patient Location:  ICU  Indication: continuous blood pressure monitoring and blood sampling needed    Site Identification: real time ultrasound guided    Preanesthetic Checklist: 2 patient identifiers, IV checked, risks and benefits discussed, monitors and equipment checked, pre-op evaluation, timeout performed, anesthesia consent and sterile technique used    Procedure Details    Catheter Size:  20 G  Catheter Length:  5 inch  Catheter Type:  Arrow  Seldinger Technique?: Yes    Laterality:  Left  Site:  Radial artery  Site Prep: chlorhexidine    Line Secured:  Tape and Tegaderm    Assessment    Events: patient tolerated procedure well with no complications      Medications  10/12/2023 6:00 AM      Additional Comments

## 2023-10-12 NOTE — PLAN OF CARE
Received pt around 1300. Pt sedated on precedex. CABG with usual lines. Pt on ventilator, after initial ABG, fiO2 decreased to 80%. Additional ABGs relayed to Dr Fritz Delgado, decision made to keep pt intubated overnight. Pt aroused on precedex gtt, follows commands in all extremities and nods/shakes head appropriately. Pt nods yes to pain in throat and to wanting pain medication, dilaudid PCA used. Propofol added for overnight. Pt on dobutamine gtt upon arrival. SBP 150s and CI 1.9, nitroglycerin gtt added to keep SBP < 140. See flowsheets for advanced hemodynamics. Pt with 700ml urine out, 70ml chest tube output. Family updated on plan of care.

## 2023-10-12 NOTE — OCCUPATIONAL THERAPY NOTE
OT orders received, chart reviewed. Patient remains vented/sedated at this time. Will hold today and continue to follow, resuming OT when medically appropriate.

## 2023-10-12 NOTE — ANESTHESIA PROCEDURE NOTES
Arterial Line    Performed by: Melissa Iverson MD  Authorized by: Melissa Iverson MD    General Information and Staff    Procedure Start:   Anesthesiologist:  Melissa Iverson MD  Performed By:  Anesthesiologist  Patient Location:  ICU  Indication: continuous blood pressure monitoring and blood sampling needed    Site Identification: real time ultrasound guided    Preanesthetic Checklist: 2 patient identifiers, IV checked, risks and benefits discussed, monitors and equipment checked, pre-op evaluation, timeout performed, anesthesia consent and sterile technique used    Procedure Details    Catheter Size:  20 G  Catheter Length:  5 inch  Catheter Type:  Arrow  Seldinger Technique?: Yes    Laterality:  Right  Site:  Radial artery  Site Prep: chlorhexidine    Line Secured:  Tape and Tegaderm    Assessment    Events: patient tolerated procedure well with no complications      Medications      Additional Comments

## 2023-10-13 ENCOUNTER — APPOINTMENT (OUTPATIENT)
Dept: GENERAL RADIOLOGY | Facility: HOSPITAL | Age: 63
DRG: 235 | End: 2023-10-13
Attending: THORACIC SURGERY (CARDIOTHORACIC VASCULAR SURGERY)
Payer: COMMERCIAL

## 2023-10-13 ENCOUNTER — APPOINTMENT (OUTPATIENT)
Dept: GENERAL RADIOLOGY | Facility: HOSPITAL | Age: 63
DRG: 235 | End: 2023-10-13
Attending: PHYSICIAN ASSISTANT
Payer: COMMERCIAL

## 2023-10-13 LAB
ANION GAP SERPL CALC-SCNC: 1 MMOL/L (ref 0–18)
BASE EXCESS BLD CALC-SCNC: -6 MMOL/L
BLOOD TYPE BARCODE: 7300
BUN BLD-MCNC: 23 MG/DL (ref 7–18)
CA-I BLD-SCNC: 1.31 MMOL/L (ref 1.12–1.32)
CALCIUM BLD-MCNC: 8.3 MG/DL (ref 8.5–10.1)
CHLORIDE SERPL-SCNC: 114 MMOL/L (ref 98–112)
CO2 BLD-SCNC: 22 MMOL/L (ref 22–32)
CO2 SERPL-SCNC: 24 MMOL/L (ref 21–32)
CREAT BLD-MCNC: 1.42 MG/DL
EGFRCR SERPLBLD CKD-EPI 2021: 42 ML/MIN/1.73M2 (ref 60–?)
ERYTHROCYTE [DISTWIDTH] IN BLOOD BY AUTOMATED COUNT: 13.7 %
GLUCOSE BLD-MCNC: 107 MG/DL (ref 70–99)
GLUCOSE BLD-MCNC: 118 MG/DL (ref 70–99)
GLUCOSE BLD-MCNC: 120 MG/DL (ref 70–99)
GLUCOSE BLD-MCNC: 120 MG/DL (ref 70–99)
GLUCOSE BLD-MCNC: 125 MG/DL (ref 70–99)
GLUCOSE BLD-MCNC: 135 MG/DL (ref 70–99)
GLUCOSE BLD-MCNC: 145 MG/DL (ref 70–99)
GLUCOSE BLD-MCNC: 152 MG/DL (ref 70–99)
GLUCOSE BLD-MCNC: 158 MG/DL (ref 70–99)
GLUCOSE BLD-MCNC: 189 MG/DL (ref 70–99)
HCO3 BLD-SCNC: 20.6 MEQ/L
HCT VFR BLD AUTO: 32 %
HCT VFR BLD CALC: 33 %
HGB BLD-MCNC: 10.8 G/DL
MCH RBC QN AUTO: 29.1 PG (ref 26–34)
MCHC RBC AUTO-ENTMCNC: 33.8 G/DL (ref 31–37)
MCV RBC AUTO: 86.3 FL
OSMOLALITY SERPL CALC.SUM OF ELEC: 295 MOSM/KG (ref 275–295)
PCO2 BLD: 42.3 MMHG
PH BLD: 7.3 [PH]
PLATELET # BLD AUTO: 155 10(3)UL (ref 150–450)
PO2 BLD: 95 MMHG
POTASSIUM BLD-SCNC: 3.9 MMOL/L (ref 3.6–5.1)
POTASSIUM SERPL-SCNC: 4.3 MMOL/L (ref 3.5–5.1)
RBC # BLD AUTO: 3.71 X10(6)UL
SAO2 % BLD: 96 %
SODIUM BLD-SCNC: 139 MMOL/L (ref 136–145)
SODIUM SERPL-SCNC: 139 MMOL/L (ref 136–145)
UNIT VOLUME: 350 ML
WBC # BLD AUTO: 15.5 X10(3) UL (ref 4–11)

## 2023-10-13 PROCEDURE — 99232 SBSQ HOSP IP/OBS MODERATE 35: CPT | Performed by: HOSPITALIST

## 2023-10-13 PROCEDURE — 71045 X-RAY EXAM CHEST 1 VIEW: CPT | Performed by: THORACIC SURGERY (CARDIOTHORACIC VASCULAR SURGERY)

## 2023-10-13 PROCEDURE — 71045 X-RAY EXAM CHEST 1 VIEW: CPT | Performed by: PHYSICIAN ASSISTANT

## 2023-10-13 RX ORDER — HYDROCODONE BITARTRATE AND ACETAMINOPHEN 5; 325 MG/1; MG/1
2 TABLET ORAL EVERY 4 HOURS PRN
Status: DISCONTINUED | OUTPATIENT
Start: 2023-10-13 | End: 2023-10-17

## 2023-10-13 RX ORDER — FUROSEMIDE 10 MG/ML
20 INJECTION INTRAMUSCULAR; INTRAVENOUS
Status: DISCONTINUED | OUTPATIENT
Start: 2023-10-13 | End: 2023-10-13

## 2023-10-13 RX ORDER — FUROSEMIDE 10 MG/ML
20 INJECTION INTRAMUSCULAR; INTRAVENOUS
Status: COMPLETED | OUTPATIENT
Start: 2023-10-13 | End: 2023-10-15

## 2023-10-13 RX ORDER — HYDROCODONE BITARTRATE AND ACETAMINOPHEN 5; 325 MG/1; MG/1
1 TABLET ORAL EVERY 4 HOURS PRN
Status: DISCONTINUED | OUTPATIENT
Start: 2023-10-13 | End: 2023-10-17

## 2023-10-13 NOTE — PLAN OF CARE
Received patient up in chair. Usual lines post CABG. Insulin gtt running, switched to sliding scale and subcutaneous. Farr, cordis, swan, a-line and chest tubes removed per order. Repeat chest x-ray completed post chest tube removal. Lasix given per order. Ambulating halls x1 for shift. C/o lots of pain, see MAR. Family at bedside participating in care.

## 2023-10-13 NOTE — PLAN OF CARE
Assumed care of pt this am, vented, sedated, on dobs, insulin, propofol, and precedex, swan, cordis, CT, smallwood. Pt extubated at 18:15, and tolerated well. POC explained to pt and family, all questions answered.

## 2023-10-13 NOTE — PHYSICAL THERAPY NOTE
PHYSICAL THERAPY EVALUATION - INPATIENT     Room Number: 4748/8520-W  Evaluation Date: 10/13/2023  Type of Evaluation: Initial  Physician Order: PT Eval and Treat    Presenting Problem: CABG x3 10/11  Co-Morbidities : CAD, HTN, HLD, LUCILLE  Reason for Therapy: Mobility Dysfunction and Discharge Planning    History related to current admission: Patient is a 66year old female admitted on 10/11/2023 from home for scheduled CABG x3 10/11. ASSESSMENT   In this PT evaluation, the patient presents with the following impairments decreased strength, functional mobility, trunk control, endurance, balance. These impairments and comorbidities manifest themselves as functional limitations in independent bed mobility, transfers, and gait. The patient is below baseline and would benefit from skilled inpatient PT to address the above deficits to assist patient in returning to prior to level of function. Functional outcome measures completed include [facility]. The AM-PAC '6-Clicks' Inpatient Basic Mobility Short Form was completed and this patient is demonstrating a Approx Degree of Impairment: 46.58%  degree of impairment in mobility. Research supports that patients with this level of impairment may benefit from [address] [address] . DISCHARGE RECOMMENDATIONS  PT Discharge Recommendations: Home with home health PT    PLAN  PT Treatment Plan: Bed mobility; Body mechanics; Endurance; Energy conservation;Patient education; Family education;Gait training;Balance training;Transfer training;Strengthening  Rehab Potential : Good  Frequency (Obs): 3-5x/week  Number of Visits to Meet Established Goals: 4      CURRENT GOALS    Goal #1 Patient is able to demonstrate supine - sit EOB @ level: modified independent     Goal #2 Patient is able to demonstrate transfers EOB to/from Chair/Wheelchair at assistance level: modified independent     Goal #3 Patient is able to ambulate 150 feet with assist device:  LRAD  at assistance level: supervision Goal #4 Pt is able to ascend and descend flight of stairs with supervision and rail    Goal #5    Goal #6    Goal Comments: Goals established on 10/13/2023    HOME SITUATION  Type of Home: House   Home Layout: Two level  Stairs to Enter : 3     Stairs to Bedroom: 15  Railing: Yes    Lives With: Spouse  Drives: Yes  Patient Owned Equipment: None       Prior Level of [place]: Pt typically indep with ADLs and mobility. Works as an . Lives with spouse. SUBJECTIVE  \"I feel sick\"      OBJECTIVE  Precautions: Bed/chair alarm;Sternal;Cardiac (SBP <130)  Fall Risk: High fall risk    WEIGHT BEARING RESTRICTION  Weight Bearing Restriction: None                PAIN ASSESSMENT  Rating: Unable to rate  Location: chest  Management Techniques: Body mechanics    COGNITION  Problem Solving:  assistance required to generate solutions and assistance required to implement solutions    RANGE OF MOTION AND STRENGTH ASSESSMENT  See OT note for UE assessment    Lower extremity ROM is within functional limits    Lower extremity strength is grossly 4/5      BALANCE  Static Sitting: Fair -  Dynamic Sitting: Poor +  Static Standing: Poor +  Dynamic Standing: Poor +    ADDITIONAL TESTS                                    ACTIVITY TOLERANCE                         O2 WALK       NEUROLOGICAL FINDINGS                        AM-PAC '6-Clicks' INPATIENT SHORT FORM - BASIC MOBILITY  How much difficulty does the patient currently have. .. Patient Difficulty: Turning over in bed (including adjusting bedclothes, sheets and blankets)?: A Little   Patient Difficulty: Sitting down on and standing up from a chair with arms (e.g., wheelchair, bedside commode, etc.): A Little   Patient Difficulty: Moving from lying on back to sitting on the side of the bed?: A Little   How much help from another person does the patient currently need. ..    Help from Another: Moving to and from a bed to a chair (including a wheelchair)?: A Little   Help from Another: Need to walk in hospital room?: A Little   Help from Another: Climbing 3-5 steps with a railing?: A Little       AM-PAC Score:  Raw Score: 18   Approx Degree of Impairment: 46.58%   Standardized Score (AM-PAC Scale): 43.63   CMS Modifier (G-Code): CK    FUNCTIONAL ABILITY STATUS  Gait Assessment   Functional Mobility/Gait Assessment  Gait Assistance: Contact guard assist  Distance (ft): 3  Assistive Device: Rolling walker  Pattern:  (dec sandra, short step/stride length)    Skilled Therapy Provided     Gait Training:   Pt cued on upright standing posture to improve alignment with UEs  Pt cued on gait sequencing with RW  Pt cued on proper UE placement on RW handles    Therapeutic Activity:   Cued on LE sequencing towards EOB for supine>sit tx   Pt cued on placement of UE and LEs for optimal force generation and safe STS transfer. Pt cued on usage of arm rests for controlled descent into sitting  Pt cued on scooting anteriorly towards EOB to facilitate improved sitting balance and prepare for sit>stand tx  Pt educated on sternal precautions - limiting pushing/pulling/lifting to <10lbs     Bed Mobility:  Rolling: NT  Supine to sit: Deidra with HOB elevated  Sit to supine: NT     Transfer Mobility:  Sit to stand: CGA   Stand to sit: CGA  Gait = CGA    Therapist's Comments: RN cleared for session. Pt agreeable for therapy, received supine. Instructed to call for nursing staff for any needs and OOB mobility. .       Exercise/Education Provided:  Bed mobility  Body mechanics  Energy conservation  Functional activity tolerated  Gait training  Posture  Strengthening  Transfer training    Patient End of Session: Up in chair;Needs met;Call light within reach;RN aware of session/findings; All patient questions and concerns addressed      Patient Evaluation Complexity Level:  History Moderate - 1 or 2 personal factors and/or co-morbidities   Examination of body systems Low - addressing 1-2 elements   Clinical Presentation Moderate - Evolving   Clinical Decision Making Moderate - Evolving       PT Session Time: 25 minutes  Gait Trainin minutes  Therapeutic Activity: 10 minutes

## 2023-10-13 NOTE — PLAN OF CARE
Assumed care of pt around 1930. On 6L nasal cannula, tolerating well. VSS. Insulin gtt infusing. Dilaudid PCA for pain control.  Up to chair this am.

## 2023-10-14 LAB
ANION GAP SERPL CALC-SCNC: 6 MMOL/L (ref 0–18)
BUN BLD-MCNC: 31 MG/DL (ref 7–18)
CALCIUM BLD-MCNC: 8.6 MG/DL (ref 8.5–10.1)
CHLORIDE SERPL-SCNC: 110 MMOL/L (ref 98–112)
CO2 SERPL-SCNC: 25 MMOL/L (ref 21–32)
CREAT BLD-MCNC: 1.12 MG/DL
EGFRCR SERPLBLD CKD-EPI 2021: 56 ML/MIN/1.73M2 (ref 60–?)
ERYTHROCYTE [DISTWIDTH] IN BLOOD BY AUTOMATED COUNT: 13.7 %
GLUCOSE BLD-MCNC: 119 MG/DL (ref 70–99)
GLUCOSE BLD-MCNC: 120 MG/DL (ref 70–99)
GLUCOSE BLD-MCNC: 126 MG/DL (ref 70–99)
GLUCOSE BLD-MCNC: 141 MG/DL (ref 70–99)
HCT VFR BLD AUTO: 33 %
HGB BLD-MCNC: 10.9 G/DL
MCH RBC QN AUTO: 29.1 PG (ref 26–34)
MCHC RBC AUTO-ENTMCNC: 33 G/DL (ref 31–37)
MCV RBC AUTO: 88.2 FL
OSMOLALITY SERPL CALC.SUM OF ELEC: 300 MOSM/KG (ref 275–295)
PLATELET # BLD AUTO: 166 10(3)UL (ref 150–450)
POTASSIUM SERPL-SCNC: 5 MMOL/L (ref 3.5–5.1)
RBC # BLD AUTO: 3.74 X10(6)UL
SODIUM SERPL-SCNC: 141 MMOL/L (ref 136–145)
WBC # BLD AUTO: 13.2 X10(3) UL (ref 4–11)

## 2023-10-14 PROCEDURE — 99232 SBSQ HOSP IP/OBS MODERATE 35: CPT | Performed by: HOSPITALIST

## 2023-10-14 RX ORDER — METOPROLOL TARTRATE 50 MG/1
50 TABLET, FILM COATED ORAL
Status: DISCONTINUED | OUTPATIENT
Start: 2023-10-14 | End: 2023-10-16

## 2023-10-14 RX ORDER — DOCUSATE SODIUM 100 MG/1
100 CAPSULE, LIQUID FILLED ORAL 2 TIMES DAILY
Status: DISCONTINUED | OUTPATIENT
Start: 2023-10-14 | End: 2023-10-17

## 2023-10-14 NOTE — PLAN OF CARE
Patient care assumed 1930 in chair, oxygen via NC, pacer wires still in place. Walking halls with assistance and oxygen. Purewick at night.

## 2023-10-14 NOTE — PLAN OF CARE
Received patient up in chair. VSS on 3L nasal cannula. Up in chair for meals and ambulating halls. Pacer wires removed per order. Patient with orders to transfer out of ICU, report called to accepting RN, patient transferred with belongings to new room assignment in wheelchair via transport services.

## 2023-10-14 NOTE — PLAN OF CARE
Received patient as transfer into 8600 around 1600. Oriented to room, call light and safety. Skin check completed with PCT. Mid sternal and left leg incision CDI with betadine painted steri strips intact. Call light and personal items in reach. All needs met.

## 2023-10-15 LAB
ANION GAP SERPL CALC-SCNC: 3 MMOL/L (ref 0–18)
BASOPHILS # BLD AUTO: 0.06 X10(3) UL (ref 0–0.2)
BASOPHILS NFR BLD AUTO: 0.5 %
BUN BLD-MCNC: 22 MG/DL (ref 7–18)
CALCIUM BLD-MCNC: 8.6 MG/DL (ref 8.5–10.1)
CHLORIDE SERPL-SCNC: 107 MMOL/L (ref 98–112)
CO2 SERPL-SCNC: 30 MMOL/L (ref 21–32)
CREAT BLD-MCNC: 1 MG/DL
EGFRCR SERPLBLD CKD-EPI 2021: 64 ML/MIN/1.73M2 (ref 60–?)
EOSINOPHIL # BLD AUTO: 0.23 X10(3) UL (ref 0–0.7)
EOSINOPHIL NFR BLD AUTO: 2 %
ERYTHROCYTE [DISTWIDTH] IN BLOOD BY AUTOMATED COUNT: 13.3 %
GLUCOSE BLD-MCNC: 114 MG/DL (ref 70–99)
GLUCOSE BLD-MCNC: 115 MG/DL (ref 70–99)
GLUCOSE BLD-MCNC: 116 MG/DL (ref 70–99)
GLUCOSE BLD-MCNC: 124 MG/DL (ref 70–99)
GLUCOSE BLD-MCNC: 150 MG/DL (ref 70–99)
HCT VFR BLD AUTO: 32.4 %
HGB BLD-MCNC: 11.3 G/DL
IMM GRANULOCYTES # BLD AUTO: 0.06 X10(3) UL (ref 0–1)
IMM GRANULOCYTES NFR BLD: 0.5 %
LYMPHOCYTES # BLD AUTO: 2.68 X10(3) UL (ref 1–4)
LYMPHOCYTES NFR BLD AUTO: 23.8 %
MCH RBC QN AUTO: 28.9 PG (ref 26–34)
MCHC RBC AUTO-ENTMCNC: 34.9 G/DL (ref 31–37)
MCV RBC AUTO: 82.9 FL
MONOCYTES # BLD AUTO: 0.83 X10(3) UL (ref 0.1–1)
MONOCYTES NFR BLD AUTO: 7.4 %
NEUTROPHILS # BLD AUTO: 7.42 X10 (3) UL (ref 1.5–7.7)
NEUTROPHILS # BLD AUTO: 7.42 X10(3) UL (ref 1.5–7.7)
NEUTROPHILS NFR BLD AUTO: 65.8 %
OSMOLALITY SERPL CALC.SUM OF ELEC: 294 MOSM/KG (ref 275–295)
PLATELET # BLD AUTO: 207 10(3)UL (ref 150–450)
POTASSIUM SERPL-SCNC: 4 MMOL/L (ref 3.5–5.1)
RBC # BLD AUTO: 3.91 X10(6)UL
SODIUM SERPL-SCNC: 140 MMOL/L (ref 136–145)
WBC # BLD AUTO: 11.3 X10(3) UL (ref 4–11)

## 2023-10-15 PROCEDURE — 99232 SBSQ HOSP IP/OBS MODERATE 35: CPT | Performed by: HOSPITALIST

## 2023-10-15 RX ORDER — FUROSEMIDE 20 MG/1
20 TABLET ORAL DAILY
Status: DISCONTINUED | OUTPATIENT
Start: 2023-10-16 | End: 2023-10-17

## 2023-10-15 NOTE — PLAN OF CARE
Assumed care for pt at 19:30 on 10/14    As of Sunday, POD#4. Midsternal and LLE/calf steristrips coated with betadine. A&Ox4. 2 tabs Norco 5 q 4h prn to manage incisional pain. 177/100, prn iv Hydralazine given. 150/86. Other VSS on 1L. NSR on tele. Pt says she was supposed to get a sleep study but hasn't yet. SpO2 87% on RA while sleeping, placed on 1L. spO2 90-95%. 1750 on IS best effort. Nonpitting BLE edema. Purewick in place. Prn miralax given. Ambulated 325 feet, had to stop once to sit due to pain. Bilat SCDs on for VTE prophylaxis. Plan: Daily weight, AM BMP/CBC draw, per Dr. Petros Viera note IV lasix to be stopped, ambulate qid, wean O2    Call light in reach. Pt's mother at bedside. Able to make needs known.

## 2023-10-15 NOTE — OCCUPATIONAL THERAPY NOTE
OCCUPATIONAL THERAPY                             OT treatment attempted. Patient reported that she already did exercises today and is tired. OT oriented patient briefly to UE and chest CV exercises in binder . Patient reported she has sternal pain right now and requested therapist to return tomorrow. Will follow. Discussed with RN.

## 2023-10-15 NOTE — PLAN OF CARE
Assumed care of patient at change of shift. AO x4. NSR on tele monitor. O2 sat adequate on RA. Denies chest pain and shortness of breath. Ambulating with walker. Incisions CDI with steri strips. Plan of care updated. Bed locked, in lowest position, with alarm on. Call light and personal items in reach. All needs met. Problem: Patient/Family Goals  Goal: Patient/Family Long Term Goal  Description: Patient's Long Term Goal: back to normal    Interventions:  - See additional Care Plan goals for specific interventions  10/15/2023 1754 by Vale Smith RN  Outcome: Progressing  Goal: Patient/Family Short Term Goal  Description: Patient's Short Term Goal: pain control    Interventions:   - See additional Care Plan goals for specific interventions  10/15/2023 1754 by Vale Smith RN  Outcome: Progressing     Problem: CARDIOVASCULAR - ADULT  Goal: Maintains optimal cardiac output and hemodynamic stability  Description: INTERVENTIONS:  - Monitor vital signs, rhythm, and trends  - Monitor for bleeding, hypotension and signs of decreased cardiac output  - Evaluate effectiveness of vasoactive medications to optimize hemodynamic stability  - Monitor arterial and/or venous puncture sites for bleeding and/or hematoma  - Assess quality of pulses, skin color and temperature  - Assess for signs of decreased coronary artery perfusion - ex.  Angina  - Evaluate fluid balance, assess for edema, trend weights  10/15/2023 1754 by Vale Smith RN  Outcome: Progressing  Goal: Absence of cardiac arrhythmias or at baseline  Description: INTERVENTIONS:  - Continuous cardiac monitoring, monitor vital signs, obtain 12 lead EKG if indicated  - Evaluate effectiveness of antiarrhythmic and heart rate control medications as ordered  - Initiate emergency measures for life threatening arrhythmias  - Monitor electrolytes and administer replacement therapy as ordered  10/15/2023 1754 by Vale Smith RN  Outcome: Progressing     Problem: RESPIRATORY - ADULT  Goal: Achieves optimal ventilation and oxygenation  Description: INTERVENTIONS:  - Assess for changes in respiratory status  - Assess for changes in mentation and behavior  - Position to facilitate oxygenation and minimize respiratory effort  - Oxygen supplementation based on oxygen saturation or ABGs  - Provide Smoking Cessation handout, if applicable  - Encourage broncho-pulmonary hygiene including cough, deep breathe, Incentive Spirometry  - Assess the need for suctioning and perform as needed  - Assess and instruct to report SOB or any respiratory difficulty  - Respiratory Therapy support as indicated  - Manage/alleviate anxiety  - Monitor for signs/symptoms of CO2 retention  10/15/2023 1754 by Druscilla Cheadle, RN  Outcome: Progressing

## 2023-10-15 NOTE — PHYSICAL THERAPY NOTE
PHYSICAL THERAPY TREATMENT NOTE - INPATIENT    Room Number: 7503/4594-T     Session: 1     Number of Visits to Meet Established Goals: 4    Presenting Problem: CABG x3 10/11  Co-Morbidities : CAD, HTN, HLD, LUCILLE  ASSESSMENT     Pt declined gait training and stair training this session 2/2 reported incisional pain - RN aware. Educated on post op exercises per protocol as appropriate. DISCHARGE RECOMMENDATIONS  PT Discharge Recommendations: Home with home health PT     PLAN  PT Treatment Plan: Bed mobility; Body mechanics; Endurance; Energy conservation;Patient education; Family education;Gait training;Balance training;Transfer training;Strengthening  Rehab Potential : Good  Frequency (Obs): 3-5x/week    CURRENT GOALS     Goal #1 Patient is able to demonstrate supine - sit EOB @ level: modified independent      Goal #2 Patient is able to demonstrate transfers EOB to/from Chair/Wheelchair at assistance level: modified independent      Goal #3 Patient is able to ambulate 150 feet with assist device:  LRAD  at assistance level: supervision      Goal #4 Pt is able to ascend and descend flight of stairs with supervision and rail    Goal #5     Goal #6     Goal Comments: Goals established on 10/13/2023    10/15/2023 all goals ongoing      SUBJECTIVE  \" I am not moving right now\"     OBJECTIVE  Precautions: Bed/chair alarm;Sternal;Cardiac (SBP <130)    WEIGHT BEARING RESTRICTION  Weight Bearing Restriction: None                PAIN ASSESSMENT   Rating: Unable to rate  Location: incisional  Management Techniques: Body mechanics; Relaxation;Breathing techniques; Activity promotion    BALANCE                                                                                                                       Static Sitting: Fair -  Dynamic Sitting: Poor +           Static Standing: Poor +  Dynamic Standing: Poor +    ACTIVITY TOLERANCE                         O2 WALK         AM-PAC '6-Clicks' INPATIENT SHORT FORM - BASIC MOBILITY  How much difficulty does the patient currently have. .. Patient Difficulty: Turning over in bed (including adjusting bedclothes, sheets and blankets)?: None   Patient Difficulty: Sitting down on and standing up from a chair with arms (e.g., wheelchair, bedside commode, etc.): A Little   Patient Difficulty: Moving from lying on back to sitting on the side of the bed?: A Little   How much help from another person does the patient currently need. .. Help from Another: Moving to and from a bed to a chair (including a wheelchair)?: A Little   Help from Another: Need to walk in hospital room?: A Little   Help from Another: Climbing 3-5 steps with a railing?: A Little       AM-PAC Score:  Raw Score: 19   Approx Degree of Impairment: 41.77%   Standardized Score (AM-PAC Scale): 45.44   CMS Modifier (G-Code): CK    FUNCTIONAL ABILITY STATUS      Skilled Therapy Provided      THERAPEUTIC EXERCISES  Lower Extremity Alternating marching  Ankle pumps  LAQ     Upper Extremity  - open/close and Scapular Retraction  Cervical Rotation, shoulder shrugs    Position Sitting     Repetitions   10   Sets   1     Patient End of Session: Up in chair;Needs met;Call light within reach;RN aware of session/findings; All patient questions and concerns addressed    PT Session Time: 15 minutes  Gait Training:  minutes  Therapeutic Activity:  minutes  Therapeutic Exercise: 15 minutes   Neuromuscular Re-education:  minutes

## 2023-10-16 LAB
ANION GAP SERPL CALC-SCNC: 3 MMOL/L (ref 0–18)
BUN BLD-MCNC: 22 MG/DL (ref 7–18)
CALCIUM BLD-MCNC: 9.1 MG/DL (ref 8.5–10.1)
CHLORIDE SERPL-SCNC: 105 MMOL/L (ref 98–112)
CHOLEST SERPL-MCNC: 128 MG/DL (ref ?–200)
CO2 SERPL-SCNC: 30 MMOL/L (ref 21–32)
CREAT BLD-MCNC: 1.03 MG/DL
EGFRCR SERPLBLD CKD-EPI 2021: 62 ML/MIN/1.73M2 (ref 60–?)
ERYTHROCYTE [DISTWIDTH] IN BLOOD BY AUTOMATED COUNT: 13.2 %
GLUCOSE BLD-MCNC: 105 MG/DL (ref 70–99)
GLUCOSE BLD-MCNC: 124 MG/DL (ref 70–99)
GLUCOSE BLD-MCNC: 135 MG/DL (ref 70–99)
GLUCOSE BLD-MCNC: 141 MG/DL (ref 70–99)
GLUCOSE BLD-MCNC: 151 MG/DL (ref 70–99)
HCT VFR BLD AUTO: 35.4 %
HDLC SERPL-MCNC: 48 MG/DL (ref 40–59)
HGB BLD-MCNC: 11.8 G/DL
LDLC SERPL CALC-MCNC: 56 MG/DL (ref ?–100)
MCH RBC QN AUTO: 28.6 PG (ref 26–34)
MCHC RBC AUTO-ENTMCNC: 33.3 G/DL (ref 31–37)
MCV RBC AUTO: 85.7 FL
NONHDLC SERPL-MCNC: 80 MG/DL (ref ?–130)
OSMOLALITY SERPL CALC.SUM OF ELEC: 292 MOSM/KG (ref 275–295)
PLATELET # BLD AUTO: 258 10(3)UL (ref 150–450)
POTASSIUM SERPL-SCNC: 4.2 MMOL/L (ref 3.5–5.1)
RBC # BLD AUTO: 4.13 X10(6)UL
SODIUM SERPL-SCNC: 138 MMOL/L (ref 136–145)
TRIGL SERPL-MCNC: 137 MG/DL (ref 30–149)
VLDLC SERPL CALC-MCNC: 20 MG/DL (ref 0–30)
WBC # BLD AUTO: 9 X10(3) UL (ref 4–11)

## 2023-10-16 PROCEDURE — 99232 SBSQ HOSP IP/OBS MODERATE 35: CPT | Performed by: HOSPITALIST

## 2023-10-16 RX ORDER — KETOROLAC TROMETHAMINE 15 MG/ML
15 INJECTION, SOLUTION INTRAMUSCULAR; INTRAVENOUS EVERY 6 HOURS PRN
Status: DISCONTINUED | OUTPATIENT
Start: 2023-10-16 | End: 2023-10-17

## 2023-10-16 RX ORDER — LOSARTAN POTASSIUM 50 MG/1
50 TABLET ORAL DAILY
Status: DISCONTINUED | OUTPATIENT
Start: 2023-10-17 | End: 2023-10-17

## 2023-10-16 RX ORDER — LOSARTAN POTASSIUM 50 MG/1
50 TABLET ORAL 2 TIMES DAILY
Status: DISCONTINUED | OUTPATIENT
Start: 2023-10-16 | End: 2023-10-16

## 2023-10-16 NOTE — PLAN OF CARE
TTE- [facility] 8/18/2023     1. The study quality is good. 2. The left ventricle is normal in size, wall thickness, and global left ventricular systolic function. The left ventricular ejection fraction is 60%. The left ventricle diastolic function is impaired (Grade I) with normal left atrial pressure. No regional wall motion abnormalities. 3. The right ventricle is normal in size. Right ventricular systolic function is normal.  4. Pulmonary artery pressure not obtained due to insignificant TR jet. 5. Dilatation of the ascending aorta is noted measuring 4.0cms. 6. No significant valvular stenosis or regurgitation noted.

## 2023-10-16 NOTE — PLAN OF CARE
Assumed care for pt at 19:30 on 10/15    As of Monday, POD#5. Midsternal and LLE incisions coated with betadine. A&Ox4. Prn Norco for pain. BP elevated, other VSS on RA when awake. Placed on 2L during sleep. NSR on tele. Continent of B&B. Up with walker and 1 assist. Bilat SCDs on for VTE prophylaxis. Plan: Encourage IS, encourage activity/ambulation, wants miralax and another suppository, pain meds    Bed alarm on, call light in reach, able to make needs known.

## 2023-10-16 NOTE — CM/SW NOTE
SW checked in with pt per RN request. Pt appreciative. Pt hopeful to be discharged on Tuesday. Confirmed that [place] [id number] is all arranged for pt. Pt was up walking the unit. Pt confirmed that she spoke with CV Surgery regarding the need for her paperwork to be completed.  &  to remain available and supportive for discharge planning needs.     BAO Humphreys, [facility]  Discharge [address] [facility].

## 2023-10-16 NOTE — PHYSICAL THERAPY NOTE
PHYSICAL THERAPY TREATMENT NOTE - INPATIENT    Room Number: 4143/0330-N     Session:2    Number of Visits to Meet Established Goals: 4    Presenting Problem: CABG x3 10/11  Co-Morbidities : CAD, HTN, HLD, LUCILLE  ASSESSMENT     The pt is seen for physical therapy treatment session in am for gait training, exercises, transfer training,stair management, and patient education on activity recommendation. Patient was educated on sternal precautions and proper technique during bed mobility. Pt demonstrates progress in endurance and mobility. At this time, pt has met all goals and completed education on exercises per binder. PT will sign off. The AM-PAC '6-Clicks' Inpatient Basic Mobility Short Form was completed and this patient is demonstrating a 35% degree of impairment in mobility. Research supports that patients with this level of impairment may benefit from [address] DISCHARGE RECOMMENDATIONS  PT Discharge Recommendations: Home with home health PT     PLAN  PT Treatment Plan: Bed mobility; Body mechanics; Endurance; Energy conservation;Patient education; Family education;Gait training;Balance training;Transfer training;Strengthening  Rehab Potential : Good  Frequency (Obs): 3-5x/week    CURRENT GOALS     Goal #1 Patient is able to demonstrate supine - sit EOB @ level: modified independent      Goal #2 Patient is able to demonstrate transfers EOB to/from Chair/Wheelchair at assistance level: modified independent  MET      Goal #3 Patient is able to ambulate 150 feet with assist device:  LRAD  at assistance level: supervision  MET      Goal #4 Pt is able to ascend and descend flight of stairs with supervision and rail   MET   Goal #5     Goal #6     Goal Comments: Goals established on 10/13/2023    10/16/2023 all goals except 1 met. Patient admits that her  able to assist.       SUBJECTIVE  \" My incisions hurt so much. \"    OBJECTIVE  Precautions: Bed/chair alarm;Sternal;Cardiac (SBP <130)    WEIGHT BEARING RESTRICTION  Weight Bearing Restriction: None                PAIN ASSESSMENT   Rating: Unable to rate  Location: incisional  Management Techniques: Body mechanics; Relaxation;Breathing techniques; Activity promotion    BALANCE                                                                                                                       Static Sitting: Fair -  Dynamic Sitting: Poor +           Static Standing: Poor +  Dynamic Standing: Poor +    ACTIVITY TOLERANCE                         O2 WALK         AM-PAC '6-Clicks' INPATIENT SHORT FORM - BASIC MOBILITY  How much difficulty does the patient currently have. .. Patient Difficulty: Turning over in bed (including adjusting bedclothes, sheets and blankets)?: None   Patient Difficulty: Sitting down on and standing up from a chair with arms (e.g., wheelchair, bedside commode, etc.): None   Patient Difficulty: Moving from lying on back to sitting on the side of the bed?: A Little   How much help from another person does the patient currently need. .. Help from Another: Moving to and from a bed to a chair (including a wheelchair)?: None   Help from Another: Need to walk in hospital room?: A Little   Help from Another: Climbing 3-5 steps with a railing?: A Little       AM-PAC Score:  Raw Score: 21   Approx Degree of Impairment: 28.97%   Standardized Score (AM-PAC Scale): 50.25   CMS Modifier (G-Code): CJ    FUNCTIONAL ABILITY STATUS    Pt performed bed mobility training with min assist.   STS with sup to mod ind depending on surface height. Gait: amb 200' with RW and sup. Navigated 9 steps. Patient was instructed on exercises per cardiac binder; proper body mechanics and log roll for bed mobility; proper sit to stand technique with reinforcement of LE force generation; sternal precautions; energy conservation techniques; and activity guidelines. Patient c/o pain and downward pull from the incisions, called CD and ordered a bra. RN was made aware. Exercises Repetitions   UPPER EXTREMITY     Head turns  10   Hand pumps  10        Bicep Curls - Alternating  10   Shoulder Flexion > 90 degrees  10              LOWER EXTREMITY     Ankle pumps 10       Heel Raises - bilateral 10   Partial Arc Quad - alternating 10   Seated Marching 10             CHEST EXERCISES     Trunk Rotation 10   Elbow Circles 10   Chest Fly's  10   Scapular Retraction 10          Patient End of Session: Up in chair;Needs met;Call light within reach;RN aware of session/findings; All patient questions and concerns addressed    PT Session Time: 23 minutes  Gait Training:15  minutes  Therapeutic Activity: 8 minutes

## 2023-10-16 NOTE — PLAN OF CARE
Assumed patient care, patient is alert and orientated x4. Patient on room air, no shortness of breath. Sinus rhythm on tele, no complain of chest pain. Abdomen is soft, non-tendered, bowel sounds are active in all four quadrants. Bed in lower position, call light within reach.      Problem: Patient/Family Goals  Goal: Patient/Family Long Term Goal  Description: Patient's Long Term Goal: back to normal    Interventions:  - See additional Care Plan goals for specific interventions  Outcome: Progressing  Goal: Patient/Family Short Term Goal  Description: Patient's Short Term Goal: pain control    Interventions:   - See additional Care Plan goals for specific interventions  Outcome: Progressing

## 2023-10-17 VITALS
DIASTOLIC BLOOD PRESSURE: 72 MMHG | WEIGHT: 256.81 LBS | OXYGEN SATURATION: 89 % | RESPIRATION RATE: 18 BRPM | TEMPERATURE: 98 F | BODY MASS INDEX: 40.31 KG/M2 | HEIGHT: 67 IN | SYSTOLIC BLOOD PRESSURE: 123 MMHG | HEART RATE: 64 BPM

## 2023-10-17 LAB
ANION GAP SERPL CALC-SCNC: 4 MMOL/L (ref 0–18)
ATRIAL RATE: 75 BPM
BUN BLD-MCNC: 19 MG/DL (ref 7–18)
CALCIUM BLD-MCNC: 9.2 MG/DL (ref 8.5–10.1)
CHLORIDE SERPL-SCNC: 105 MMOL/L (ref 98–112)
CO2 SERPL-SCNC: 30 MMOL/L (ref 21–32)
CREAT BLD-MCNC: 0.93 MG/DL
EGFRCR SERPLBLD CKD-EPI 2021: 70 ML/MIN/1.73M2 (ref 60–?)
GLUCOSE BLD-MCNC: 104 MG/DL (ref 70–99)
GLUCOSE BLD-MCNC: 110 MG/DL (ref 70–99)
GLUCOSE BLD-MCNC: 149 MG/DL (ref 70–99)
OSMOLALITY SERPL CALC.SUM OF ELEC: 291 MOSM/KG (ref 275–295)
P AXIS: 51 DEGREES
P-R INTERVAL: 144 MS
POTASSIUM SERPL-SCNC: 4.2 MMOL/L (ref 3.5–5.1)
Q-T INTERVAL: 394 MS
QRS DURATION: 80 MS
QTC CALCULATION (BEZET): 439 MS
R AXIS: -14 DEGREES
SODIUM SERPL-SCNC: 139 MMOL/L (ref 136–145)
T AXIS: 65 DEGREES
VENTRICULAR RATE: 75 BPM

## 2023-10-17 PROCEDURE — 99232 SBSQ HOSP IP/OBS MODERATE 35: CPT | Performed by: HOSPITALIST

## 2023-10-17 RX ORDER — HYDROCODONE BITARTRATE AND ACETAMINOPHEN 5; 325 MG/1; MG/1
1-2 TABLET ORAL EVERY 6 HOURS PRN
Qty: 30 TABLET | Refills: 0 | Status: SHIPPED | OUTPATIENT
Start: 2023-10-17

## 2023-10-17 RX ORDER — FUROSEMIDE 40 MG/1
20 TABLET ORAL DAILY
Status: SHIPPED | COMMUNITY
Start: 2023-10-17

## 2023-10-17 RX ORDER — LANCETS 30 GAUGE
EACH MISCELLANEOUS
Qty: 1 KIT | Refills: 0 | Status: SHIPPED | OUTPATIENT
Start: 2023-10-17

## 2023-10-17 RX ORDER — ROSUVASTATIN CALCIUM 20 MG/1
20 TABLET, COATED ORAL NIGHTLY
Qty: 30 TABLET | Refills: 3 | Status: SHIPPED
Start: 2023-10-17

## 2023-10-17 RX ORDER — BLOOD-GLUCOSE METER
EACH MISCELLANEOUS
Qty: 50 STRIP | Refills: 6 | Status: SHIPPED | OUTPATIENT
Start: 2023-10-17

## 2023-10-17 NOTE — PLAN OF CARE
Assumed care for pt at 19:30 on 10/16    As of Tuesday, POD#6 midsternal and RLE incision steristips c/d/I    A&Ox4. Anxious, feels she had a great day and night isn't going well. Able to sleep in recliner. Pain treated with 2 tabs Norco 5. BP elevated, 157/89. Other VSS on RA. NSR on tele. Continent of B&B, declined colace. Up with walker and 1 assist.     Plan: Daily weight, BMP AM draw, anticipated dc    Bed alarm in use, call light in reach, able to make needs known.

## 2023-10-17 NOTE — CM/SW NOTE
AVS attached to [facility] notifying of discharge today.      BAO Dumont, [facility]  Discharge [address] [facility].

## 2023-10-18 ENCOUNTER — TELEPHONE (OUTPATIENT)
Dept: INTERNAL MEDICINE CLINIC | Facility: CLINIC | Age: 63
End: 2023-10-18

## 2023-10-18 ENCOUNTER — TELEPHONE (OUTPATIENT)
Dept: CARDIOLOGY UNIT | Facility: HOSPITAL | Age: 63
End: 2023-10-18

## 2023-10-18 ENCOUNTER — PATIENT OUTREACH (OUTPATIENT)
Dept: CASE MANAGEMENT | Age: 63
End: 2023-10-18

## 2023-10-18 DIAGNOSIS — Z02.9 ENCOUNTERS FOR UNSPECIFIED ADMINISTRATIVE PURPOSE: Primary | ICD-10-CM

## 2023-10-18 DIAGNOSIS — I25.810 CORONARY ARTERY DISEASE INVOLVING OTHER CORONARY ARTERY BYPASS GRAFT, UNSPECIFIED WHETHER ANGINA PRESENT: ICD-10-CM

## 2023-10-18 NOTE — PROGRESS NOTES
NCM attempted to contact patient for HFU, no answer, unable to LM on VM, call rings once, then disconnects. NCM will try calling back at a later time.

## 2023-10-18 NOTE — PROGRESS NOTES
Follow Up Phone Call    1. How are you doing now that you are home? C/O rough first night with pain but doing ok on norco.    2. Have there been any changes in your wound/incision since going home? No    3. Is your pain manageable at home? Yes, medical reason: Norco    4. Are you following the walking routine given to you in the hospital? Yes    5. Are you continuing to use your incentive spirometer? Yes    6. Do you have your appointments for Chest Xray?  Yes, medical reason: 10/23                                                              Primary MD?  Yes, medical reason: will call to make appt                                                              Cardiologist?  Yes, medical reason: 10/23                                                              Dr. Herrera? Yes, medical reason: 11/8                                                              Cardiac Rehab?  Yes, medical reason: 11/9    7. Do you have any other questions or concerns today? No        Bernadette BLANCO RN  10/18/2023  11:10 AM

## 2023-10-18 NOTE — TELEPHONE ENCOUNTER
Spoke to pt for TCM today. pt has TCM appointment scheduled for TCM on 10/26/23 with PCP. Pt is requesting for appointment to be changed to virtual visit. Please discuss with PCP if okay to change visit to virtual. Thank you.

## 2023-10-18 NOTE — DISCHARGE SUMMARY
[facility]  Discharge Summary    Dominick Hilario Patient Status:  Inpatient    10/13/1960 MRN [id number]   [facility] 8NE-A Attending No att. providers found   Hosp Day # 6 PCP Aram Ocampo MD     Admit date: 10/11/2023    Discharge date and time: 10/17/2023  5:30 PM     Discharge Diagnoses:   Coronary artery disease     Procedures Performed:   Coronary revascularization x3: Left internal mammary artery graft to the left anterior descending; saphenous vein graft to the obtuse marginal and right coronary artery     HPI & Hospital Course: Dominick Hilario is a 66year old female with coronary artery disease who was admitted to the hospital for coronary revascularization. Surgery was performed and recovery was uneventful. For more detailed information please see the medical record. Discharge Condition: Stable     Discharge Instructions:  Pt was instructed not to lift anything heavy and to follow up with Dr. Marcelo Hernandez and Cardiology as directed.      Signed:  Radha Perrin MD  10/18/2023

## 2023-10-18 NOTE — PROGRESS NOTES
NCM attempted to contact the patient for HFU. A woman answered, said hello then hung up. SHINE will try again later.

## 2023-10-19 NOTE — TELEPHONE ENCOUNTER
Spoke to , Nivia Butler, on verbal. He stated pt was with home therapist at the time. Explained to him that appt on 10/26 needs to be in person. He v/u and stated they will be there then.

## 2023-10-23 ENCOUNTER — HOSPITAL ENCOUNTER (OUTPATIENT)
Dept: GENERAL RADIOLOGY | Facility: HOSPITAL | Age: 63
Discharge: HOME | End: 2023-10-23
Attending: THORACIC SURGERY (CARDIOTHORACIC VASCULAR SURGERY)

## 2023-10-23 DIAGNOSIS — J90 PLEURAL EFFUSION: ICD-10-CM

## 2023-10-23 PROCEDURE — 71048 X-RAY EXAM CHEST 4+ VIEWS: CPT | Performed by: THORACIC SURGERY (CARDIOTHORACIC VASCULAR SURGERY)

## 2023-10-26 ENCOUNTER — OFFICE VISIT (OUTPATIENT)
Dept: INTERNAL MEDICINE CLINIC | Facility: CLINIC | Age: 63
End: 2023-10-26

## 2023-10-26 VITALS
BODY MASS INDEX: 39.65 KG/M2 | TEMPERATURE: 98 F | OXYGEN SATURATION: 97 % | WEIGHT: 252.63 LBS | DIASTOLIC BLOOD PRESSURE: 68 MMHG | HEIGHT: 67 IN | SYSTOLIC BLOOD PRESSURE: 122 MMHG | HEART RATE: 74 BPM

## 2023-10-26 DIAGNOSIS — E11.9 TYPE 2 DIABETES MELLITUS WITHOUT COMPLICATION, WITHOUT LONG-TERM CURRENT USE OF INSULIN (HCC): ICD-10-CM

## 2023-10-26 DIAGNOSIS — E66.9 OBESITY (BMI 30-39.9): ICD-10-CM

## 2023-10-26 DIAGNOSIS — Z12.31 VISIT FOR SCREENING MAMMOGRAM: ICD-10-CM

## 2023-10-26 DIAGNOSIS — L03.116 CELLULITIS OF LEFT LOWER EXTREMITY: ICD-10-CM

## 2023-10-26 DIAGNOSIS — I25.810 CORONARY ARTERY DISEASE INVOLVING OTHER CORONARY ARTERY BYPASS GRAFT, UNSPECIFIED WHETHER ANGINA PRESENT: Primary | ICD-10-CM

## 2023-10-26 PROCEDURE — 99495 TRANSJ CARE MGMT MOD F2F 14D: CPT | Performed by: FAMILY MEDICINE

## 2023-10-26 PROCEDURE — 3008F BODY MASS INDEX DOCD: CPT | Performed by: FAMILY MEDICINE

## 2023-10-26 PROCEDURE — 3074F SYST BP LT 130 MM HG: CPT | Performed by: FAMILY MEDICINE

## 2023-10-26 PROCEDURE — 3078F DIAST BP <80 MM HG: CPT | Performed by: FAMILY MEDICINE

## 2023-10-26 RX ORDER — IBUPROFEN 600 MG/1
TABLET ORAL
COMMUNITY
Start: 2023-10-23

## 2023-10-26 RX ORDER — CEPHALEXIN 500 MG/1
500 CAPSULE ORAL 3 TIMES DAILY
COMMUNITY
Start: 2023-10-23

## 2023-10-30 ENCOUNTER — ORDER TRANSCRIPTION (OUTPATIENT)
Dept: CARDIAC REHAB | Facility: HOSPITAL | Age: 63
End: 2023-10-30

## 2023-10-30 DIAGNOSIS — Z95.1 S/P CABG (CORONARY ARTERY BYPASS GRAFT): Primary | ICD-10-CM

## 2023-11-02 ENCOUNTER — APPOINTMENT (OUTPATIENT)
Dept: GENERAL RADIOLOGY | Facility: HOSPITAL | Age: 63
End: 2023-11-02
Payer: COMMERCIAL

## 2023-11-02 ENCOUNTER — APPOINTMENT (OUTPATIENT)
Dept: CT IMAGING | Facility: HOSPITAL | Age: 63
End: 2023-11-02
Attending: EMERGENCY MEDICINE
Payer: COMMERCIAL

## 2023-11-02 ENCOUNTER — HOSPITAL ENCOUNTER (INPATIENT)
Facility: HOSPITAL | Age: 63
LOS: 2 days | Discharge: HOME | End: 2023-11-05
Attending: EMERGENCY MEDICINE | Admitting: HOSPITALIST
Payer: COMMERCIAL

## 2023-11-02 DIAGNOSIS — R79.89 ELEVATED TROPONIN: Primary | ICD-10-CM

## 2023-11-02 DIAGNOSIS — I26.94 MULTIPLE SUBSEGMENTAL PULMONARY EMBOLI WITHOUT ACUTE COR PULMONALE (HCC): ICD-10-CM

## 2023-11-02 DIAGNOSIS — I77.1 CELIAC ARTERY STENOSIS (HCC): ICD-10-CM

## 2023-11-02 LAB
ALBUMIN SERPL-MCNC: 3.8 G/DL (ref 3.4–5)
ALBUMIN/GLOB SERPL: 0.9 {RATIO} (ref 1–2)
ALP LIVER SERPL-CCNC: 115 U/L
ALT SERPL-CCNC: 36 U/L
ANION GAP SERPL CALC-SCNC: 5 MMOL/L (ref 0–18)
AST SERPL-CCNC: 22 U/L (ref 15–37)
BASOPHILS # BLD AUTO: 0.04 X10(3) UL (ref 0–0.2)
BASOPHILS NFR BLD AUTO: 0.4 %
BILIRUB SERPL-MCNC: 0.4 MG/DL (ref 0.1–2)
BUN BLD-MCNC: 14 MG/DL (ref 9–23)
CALCIUM BLD-MCNC: 10 MG/DL (ref 8.5–10.1)
CHLORIDE SERPL-SCNC: 106 MMOL/L (ref 98–112)
CHOLEST SERPL-MCNC: 154 MG/DL (ref ?–200)
CO2 SERPL-SCNC: 27 MMOL/L (ref 21–32)
CREAT BLD-MCNC: 1.25 MG/DL
EGFRCR SERPLBLD CKD-EPI 2021: 49 ML/MIN/1.73M2 (ref 60–?)
EOSINOPHIL # BLD AUTO: 0.43 X10(3) UL (ref 0–0.7)
EOSINOPHIL NFR BLD AUTO: 4.6 %
ERYTHROCYTE [DISTWIDTH] IN BLOOD BY AUTOMATED COUNT: 13.7 %
GLOBULIN PLAS-MCNC: 4.2 G/DL (ref 2.8–4.4)
GLUCOSE BLD-MCNC: 110 MG/DL (ref 70–99)
HCT VFR BLD AUTO: 34.6 %
HDLC SERPL-MCNC: 56 MG/DL (ref 40–59)
HGB BLD-MCNC: 12 G/DL
IMM GRANULOCYTES # BLD AUTO: 0.01 X10(3) UL (ref 0–1)
IMM GRANULOCYTES NFR BLD: 0.1 %
LDLC SERPL CALC-MCNC: 75 MG/DL (ref ?–100)
LIPASE SERPL-CCNC: 41 U/L (ref 13–75)
LYMPHOCYTES # BLD AUTO: 2.84 X10(3) UL (ref 1–4)
LYMPHOCYTES NFR BLD AUTO: 30.5 %
MCH RBC QN AUTO: 28.5 PG (ref 26–34)
MCHC RBC AUTO-ENTMCNC: 34.7 G/DL (ref 31–37)
MCV RBC AUTO: 82.2 FL
MONOCYTES # BLD AUTO: 0.85 X10(3) UL (ref 0.1–1)
MONOCYTES NFR BLD AUTO: 9.1 %
NEUTROPHILS # BLD AUTO: 5.15 X10 (3) UL (ref 1.5–7.7)
NEUTROPHILS # BLD AUTO: 5.15 X10(3) UL (ref 1.5–7.7)
NEUTROPHILS NFR BLD AUTO: 55.3 %
NONHDLC SERPL-MCNC: 98 MG/DL (ref ?–130)
OSMOLALITY SERPL CALC.SUM OF ELEC: 287 MOSM/KG (ref 275–295)
PLATELET # BLD AUTO: 315 10(3)UL (ref 150–450)
POTASSIUM SERPL-SCNC: 4.2 MMOL/L (ref 3.5–5.1)
PROT SERPL-MCNC: 8 G/DL (ref 6.4–8.2)
RBC # BLD AUTO: 4.21 X10(6)UL
SODIUM SERPL-SCNC: 138 MMOL/L (ref 136–145)
TRIGL SERPL-MCNC: 130 MG/DL (ref 30–149)
TROPONIN I SERPL HS-MCNC: 244 NG/L
VLDLC SERPL CALC-MCNC: 20 MG/DL (ref 0–30)
WBC # BLD AUTO: 9.3 X10(3) UL (ref 4–11)

## 2023-11-02 PROCEDURE — 99223 1ST HOSP IP/OBS HIGH 75: CPT | Performed by: HOSPITALIST

## 2023-11-02 PROCEDURE — 71275 CT ANGIOGRAPHY CHEST: CPT | Performed by: EMERGENCY MEDICINE

## 2023-11-02 PROCEDURE — 71045 X-RAY EXAM CHEST 1 VIEW: CPT | Performed by: EMERGENCY MEDICINE

## 2023-11-02 PROCEDURE — 74177 CT ABD & PELVIS W/CONTRAST: CPT | Performed by: EMERGENCY MEDICINE

## 2023-11-02 RX ORDER — MORPHINE SULFATE 4 MG/ML
4 INJECTION, SOLUTION INTRAMUSCULAR; INTRAVENOUS EVERY 30 MIN PRN
Status: COMPLETED | OUTPATIENT
Start: 2023-11-02 | End: 2023-11-04

## 2023-11-02 RX ORDER — SODIUM CHLORIDE 9 MG/ML
125 INJECTION, SOLUTION INTRAVENOUS CONTINUOUS
Status: DISCONTINUED | OUTPATIENT
Start: 2023-11-02 | End: 2023-11-05

## 2023-11-02 RX ORDER — ONDANSETRON 2 MG/ML
4 INJECTION INTRAMUSCULAR; INTRAVENOUS ONCE
Status: COMPLETED | OUTPATIENT
Start: 2023-11-02 | End: 2023-11-02

## 2023-11-03 ENCOUNTER — APPOINTMENT (OUTPATIENT)
Dept: CV DIAGNOSTICS | Facility: HOSPITAL | Age: 63
End: 2023-11-03
Attending: HOSPITALIST
Payer: COMMERCIAL

## 2023-11-03 ENCOUNTER — APPOINTMENT (OUTPATIENT)
Dept: ULTRASOUND IMAGING | Facility: HOSPITAL | Age: 63
End: 2023-11-03
Attending: HOSPITALIST
Payer: COMMERCIAL

## 2023-11-03 PROBLEM — I26.94 MULTIPLE SUBSEGMENTAL PULMONARY EMBOLI WITHOUT ACUTE COR PULMONALE (HCC): Status: ACTIVE | Noted: 2023-11-03

## 2023-11-03 LAB
ANION GAP SERPL CALC-SCNC: 5 MMOL/L (ref 0–18)
ANION GAP SERPL CALC-SCNC: 6 MMOL/L (ref 0–18)
APTT PPP: 25.8 SECONDS (ref 23.3–35.6)
APTT PPP: 68 SECONDS (ref 23.3–35.6)
APTT PPP: >240 SECONDS (ref 23.3–35.6)
ATRIAL RATE: 86 BPM
BUN BLD-MCNC: 12 MG/DL (ref 9–23)
BUN BLD-MCNC: 12 MG/DL (ref 9–23)
CALCIUM BLD-MCNC: 9.2 MG/DL (ref 8.5–10.1)
CALCIUM BLD-MCNC: 9.3 MG/DL (ref 8.5–10.1)
CHLORIDE SERPL-SCNC: 107 MMOL/L (ref 98–112)
CHLORIDE SERPL-SCNC: 108 MMOL/L (ref 98–112)
CO2 SERPL-SCNC: 24 MMOL/L (ref 21–32)
CO2 SERPL-SCNC: 26 MMOL/L (ref 21–32)
CREAT BLD-MCNC: 1.12 MG/DL
CREAT BLD-MCNC: 1.22 MG/DL
EGFRCR SERPLBLD CKD-EPI 2021: 50 ML/MIN/1.73M2 (ref 60–?)
EGFRCR SERPLBLD CKD-EPI 2021: 56 ML/MIN/1.73M2 (ref 60–?)
ERYTHROCYTE [DISTWIDTH] IN BLOOD BY AUTOMATED COUNT: 14 %
ERYTHROCYTE [DISTWIDTH] IN BLOOD BY AUTOMATED COUNT: 14.1 %
GLUCOSE BLD-MCNC: 117 MG/DL (ref 70–99)
GLUCOSE BLD-MCNC: 118 MG/DL (ref 70–99)
GLUCOSE BLD-MCNC: 120 MG/DL (ref 70–99)
GLUCOSE BLD-MCNC: 129 MG/DL (ref 70–99)
GLUCOSE BLD-MCNC: 130 MG/DL (ref 70–99)
GLUCOSE BLD-MCNC: 132 MG/DL (ref 70–99)
GLUCOSE BLD-MCNC: 135 MG/DL (ref 70–99)
HCT VFR BLD AUTO: 34.2 %
HCT VFR BLD AUTO: 34.4 %
HGB BLD-MCNC: 11.5 G/DL
HGB BLD-MCNC: 11.7 G/DL
MCH RBC QN AUTO: 28.6 PG (ref 26–34)
MCH RBC QN AUTO: 29.2 PG (ref 26–34)
MCHC RBC AUTO-ENTMCNC: 33.4 G/DL (ref 31–37)
MCHC RBC AUTO-ENTMCNC: 34.2 G/DL (ref 31–37)
MCV RBC AUTO: 85.3 FL
MCV RBC AUTO: 85.6 FL
OSMOLALITY SERPL CALC.SUM OF ELEC: 286 MOSM/KG (ref 275–295)
OSMOLALITY SERPL CALC.SUM OF ELEC: 289 MOSM/KG (ref 275–295)
P AXIS: 44 DEGREES
P-R INTERVAL: 162 MS
PLATELET # BLD AUTO: 270 10(3)UL (ref 150–450)
PLATELET # BLD AUTO: 281 10(3)UL (ref 150–450)
POTASSIUM SERPL-SCNC: 4.2 MMOL/L (ref 3.5–5.1)
POTASSIUM SERPL-SCNC: 4.4 MMOL/L (ref 3.5–5.1)
Q-T INTERVAL: 374 MS
QRS DURATION: 86 MS
QTC CALCULATION (BEZET): 447 MS
R AXIS: -22 DEGREES
RBC # BLD AUTO: 4.01 X10(6)UL
RBC # BLD AUTO: 4.02 X10(6)UL
SARS-COV-2 RNA RESP QL NAA+PROBE: NOT DETECTED
SODIUM SERPL-SCNC: 137 MMOL/L (ref 136–145)
SODIUM SERPL-SCNC: 139 MMOL/L (ref 136–145)
T AXIS: 74 DEGREES
TROPONIN I SERPL HS-MCNC: 226 NG/L
TROPONIN I SERPL HS-MCNC: 243 NG/L
VENTRICULAR RATE: 86 BPM
WBC # BLD AUTO: 7.8 X10(3) UL (ref 4–11)
WBC # BLD AUTO: 9 X10(3) UL (ref 4–11)

## 2023-11-03 PROCEDURE — 93306 TTE W/DOPPLER COMPLETE: CPT | Performed by: HOSPITALIST

## 2023-11-03 PROCEDURE — 99233 SBSQ HOSP IP/OBS HIGH 50: CPT | Performed by: HOSPITALIST

## 2023-11-03 PROCEDURE — 93970 EXTREMITY STUDY: CPT | Performed by: HOSPITALIST

## 2023-11-03 RX ORDER — DEXTROSE MONOHYDRATE 25 G/50ML
50 INJECTION, SOLUTION INTRAVENOUS
Status: DISCONTINUED | OUTPATIENT
Start: 2023-11-03 | End: 2023-11-05

## 2023-11-03 RX ORDER — ASPIRIN 81 MG/1
81 TABLET ORAL DAILY
Status: DISCONTINUED | OUTPATIENT
Start: 2023-11-03 | End: 2023-11-05

## 2023-11-03 RX ORDER — ROSUVASTATIN CALCIUM 20 MG/1
20 TABLET, COATED ORAL NIGHTLY
Status: DISCONTINUED | OUTPATIENT
Start: 2023-11-03 | End: 2023-11-05

## 2023-11-03 RX ORDER — TRAMADOL HYDROCHLORIDE 50 MG/1
50 TABLET ORAL EVERY 6 HOURS PRN
Status: DISCONTINUED | OUTPATIENT
Start: 2023-11-03 | End: 2023-11-05

## 2023-11-03 RX ORDER — HYDROCODONE BITARTRATE AND ACETAMINOPHEN 5; 325 MG/1; MG/1
1-2 TABLET ORAL EVERY 6 HOURS PRN
Status: DISCONTINUED | OUTPATIENT
Start: 2023-11-03 | End: 2023-11-05

## 2023-11-03 RX ORDER — MORPHINE SULFATE 4 MG/ML
4 INJECTION, SOLUTION INTRAMUSCULAR; INTRAVENOUS EVERY 2 HOUR PRN
Status: DISCONTINUED | OUTPATIENT
Start: 2023-11-03 | End: 2023-11-05

## 2023-11-03 RX ORDER — NICOTINE POLACRILEX 4 MG
30 LOZENGE BUCCAL
Status: DISCONTINUED | OUTPATIENT
Start: 2023-11-03 | End: 2023-11-05

## 2023-11-03 RX ORDER — NICOTINE POLACRILEX 4 MG
15 LOZENGE BUCCAL
Status: DISCONTINUED | OUTPATIENT
Start: 2023-11-03 | End: 2023-11-05

## 2023-11-03 RX ORDER — FUROSEMIDE 20 MG/1
20 TABLET ORAL DAILY
Status: DISCONTINUED | OUTPATIENT
Start: 2023-11-03 | End: 2023-11-05

## 2023-11-03 RX ORDER — HEPARIN SODIUM AND DEXTROSE 10000; 5 [USP'U]/100ML; G/100ML
18 INJECTION INTRAVENOUS ONCE
Status: COMPLETED | OUTPATIENT
Start: 2023-11-03 | End: 2023-11-03

## 2023-11-03 RX ORDER — HEPARIN SODIUM AND DEXTROSE 10000; 5 [USP'U]/100ML; G/100ML
INJECTION INTRAVENOUS CONTINUOUS
Status: DISCONTINUED | OUTPATIENT
Start: 2023-11-03 | End: 2023-11-03

## 2023-11-03 RX ORDER — SENNOSIDES 8.6 MG
17.2 TABLET ORAL NIGHTLY PRN
Status: DISCONTINUED | OUTPATIENT
Start: 2023-11-03 | End: 2023-11-05

## 2023-11-03 RX ORDER — ACETAMINOPHEN 500 MG
500 TABLET ORAL EVERY 4 HOURS PRN
Status: DISCONTINUED | OUTPATIENT
Start: 2023-11-03 | End: 2023-11-05

## 2023-11-03 RX ORDER — HEPARIN SODIUM AND DEXTROSE 10000; 5 [USP'U]/100ML; G/100ML
INJECTION INTRAVENOUS CONTINUOUS
Status: DISCONTINUED | OUTPATIENT
Start: 2023-11-03 | End: 2023-11-05

## 2023-11-03 RX ORDER — HEPARIN SODIUM 1000 [USP'U]/ML
80 INJECTION, SOLUTION INTRAVENOUS; SUBCUTANEOUS ONCE
Status: COMPLETED | OUTPATIENT
Start: 2023-11-03 | End: 2023-11-03

## 2023-11-03 RX ORDER — MORPHINE SULFATE 2 MG/ML
2 INJECTION, SOLUTION INTRAMUSCULAR; INTRAVENOUS EVERY 2 HOUR PRN
Status: DISCONTINUED | OUTPATIENT
Start: 2023-11-03 | End: 2023-11-05

## 2023-11-03 RX ORDER — ENOXAPARIN SODIUM 100 MG/ML
40 INJECTION SUBCUTANEOUS DAILY
Status: DISCONTINUED | OUTPATIENT
Start: 2023-11-03 | End: 2023-11-03

## 2023-11-03 RX ORDER — PROCHLORPERAZINE EDISYLATE 5 MG/ML
5 INJECTION INTRAMUSCULAR; INTRAVENOUS EVERY 8 HOURS PRN
Status: DISCONTINUED | OUTPATIENT
Start: 2023-11-03 | End: 2023-11-05

## 2023-11-03 RX ORDER — PANTOPRAZOLE SODIUM 40 MG/1
40 TABLET, DELAYED RELEASE ORAL
Status: DISCONTINUED | OUTPATIENT
Start: 2023-11-03 | End: 2023-11-05

## 2023-11-03 RX ORDER — ENEMA 19; 7 G/133ML; G/133ML
1 ENEMA RECTAL ONCE AS NEEDED
Status: DISCONTINUED | OUTPATIENT
Start: 2023-11-03 | End: 2023-11-05

## 2023-11-03 RX ORDER — MELATONIN
3 NIGHTLY PRN
Status: DISCONTINUED | OUTPATIENT
Start: 2023-11-03 | End: 2023-11-05

## 2023-11-03 RX ORDER — ONDANSETRON 2 MG/ML
4 INJECTION INTRAMUSCULAR; INTRAVENOUS EVERY 6 HOURS PRN
Status: DISCONTINUED | OUTPATIENT
Start: 2023-11-03 | End: 2023-11-05

## 2023-11-03 RX ORDER — MORPHINE SULFATE 2 MG/ML
1 INJECTION, SOLUTION INTRAMUSCULAR; INTRAVENOUS EVERY 2 HOUR PRN
Status: DISCONTINUED | OUTPATIENT
Start: 2023-11-03 | End: 2023-11-05

## 2023-11-03 RX ORDER — BISACODYL 10 MG
10 SUPPOSITORY, RECTAL RECTAL
Status: DISCONTINUED | OUTPATIENT
Start: 2023-11-03 | End: 2023-11-05

## 2023-11-03 RX ORDER — DICYCLOMINE HCL 20 MG
20 TABLET ORAL EVERY 4 HOURS PRN
Status: DISCONTINUED | OUTPATIENT
Start: 2023-11-03 | End: 2023-11-05

## 2023-11-03 RX ORDER — METOPROLOL SUCCINATE 100 MG/1
100 TABLET, EXTENDED RELEASE ORAL
Status: DISCONTINUED | OUTPATIENT
Start: 2023-11-03 | End: 2023-11-05

## 2023-11-03 RX ORDER — POLYETHYLENE GLYCOL 3350 17 G/17G
17 POWDER, FOR SOLUTION ORAL DAILY PRN
Status: DISCONTINUED | OUTPATIENT
Start: 2023-11-03 | End: 2023-11-05

## 2023-11-03 RX ORDER — LOSARTAN POTASSIUM 100 MG/1
100 TABLET ORAL DAILY
Status: DISCONTINUED | OUTPATIENT
Start: 2023-11-03 | End: 2023-11-05

## 2023-11-03 NOTE — RESPIRATORY THERAPY NOTE
Patient with a history of sleep apnea. She states that she is intolerant of cpap and doesn't wear one at home. On LUCILLE protocol.

## 2023-11-03 NOTE — PLAN OF CARE
Molly Lyon Patient Status:  Observation    10/13/1960 MRN [id number]   [facility] 8NE-A Attending Emanuel Womack [address] Day # 0 PCP Alexa Simmons MD       Cardiology Nocturnal APN Note    Page Received: Dr. Denise Schroeder, ED Physician    HPI:     Patient is a 66year old female with PMH of CAD s/p CABG x 3 on 10/11/2023 (LIMA-->LAD, SVG -->OM, SVG-->rPDA), PAD s/p angioplasty, HTN, HLD and LUCILLE who presented to the ED with c/o epigastric pain that radiates to her chest. Pt reportes her symptoms started after eating breakfast in the morning. Pt denied dyspnea or any other associated symptoms. In ED, troponin was noted to be elevated. [facility] consulted for chest pain and elevated troponin. Plan of care reviewed with on call cardiologist. Heparin protocol for ACS was started. HPI obtained from chart review and information provided by ED physician. ED Clinical Course    EKG: Normal sinus rhythm. No acute ischemic changes. Labs: Cr 1.25, troponin 244    Imaging: CXR with no acute abnormality    Medications: Heparin                Vital Signs:       11/3/2023     1:45 AM 11/3/2023     1:49 AM   Vitals History   /82 139/70   BP Location Left arm Left arm   Pulse 97 110   Weight  253 lbs 12 oz   BMI  39.74 kg/m2        Labs:   Lab Results   Component Value Date    WBC 9.3 2023    HGB 12.0 2023    HCT 34.6 2023    .0 2023    CREATSERUM 1.25 2023    BUN 14 2023     2023    K 4.2 2023     2023    CO2 27.0 2023     2023    CA 10.0 2023    ALB 3.8 2023    ALKPHO 115 2023    BILT 0.4 2023    TP 8.0 2023    AST 22 2023    ALT 36 2023    PTT 25.8 2023    LIP 41 2023    TROPHS 244 2023       Diagnostics:   XR CHEST AP PORTABLE  (CPT=71045)    Result Date: 2023  CONCLUSION:  1. Stable cardiomegaly and changes of prior CABG.  2. No evidence of acute cardiopulmonary disease. LOCATION:  [facility]      Dictated by (CST): Flora Solorio, DO on 11/02/2023 at 10:45 PM     Finalized by (CST): Flora Solorio, DO on 11/02/2023 at 10:45 PM       XR CHEST DECUBITUS BILAT INCL PA AND LAT(4 VIEWS)(CPT=71048)    Result Date: 10/23/2023  CONCLUSION:  Trace left pleural effusion. LOCATION:  [facility]    Dictated by (CST): Lore Olivares MD on 10/23/2023 at 2:31 PM     Finalized by (CST): Lore Olivares MD on 10/23/2023 at 2:33 PM        Allergies:    Hydrochlorothiazide     RASH    Comment:\"TABS\"  Sulfa Drugs Cross R*    RASH    Medications:    aspirin    dicyclomine    furosemide    HYDROcodone-acetaminophen    losartan    metoprolol succinate ER    rosuvastatin    glucose **OR** glucose **OR** glucose-vitamin C **OR** dextrose **OR** glucose **OR** glucose **OR** glucose-vitamin C    melatonin    acetaminophen    ondansetron    prochlorperazine    polyethylene glycol (PEG 3350)    sennosides    bisacodyl    fleet enema    insulin aspart    ED initial dose (PE/DVT) heparin    continuous dose heparin    morphINE **OR** morphINE **OR** morphINE    morphINE    sodium chloride       Assessment/Plan:    - Heparin protocol for ACS  - 2D echocardiogram pending  - Continue to trend troponin  - Continue to monitor overnight on telemetry  - Formal cardiology consult to follow in AM.       Maggie Elaine, [address]  11/3/2023  3:29 AM

## 2023-11-03 NOTE — PROGRESS NOTES
NURSING ADMISSION NOTE      Patient admitted via Cart  Oriented to room. Safety precautions initiated. Bed in low position. Call light in reach. Received patient from ED at approximately [address] [facility]. Admission navigators and PTA med list completed. Patient alert and oriented x 4. Maintaining O2 saturation WNL on 2L/min via NC. NSR on tele monitor. Patient complaining of pain in the abdomen. PRN Morphine given per MAR. Skin check done, scabbed incision to the abdomen noted. Mid sternal and lf lower leg incision healing well. Heparin gtt infusing per MAR. Patient aware of plan of care. Safety precautions in place, bed alarm in place, call light within reach.         POC:     ECHO   BLE doppler US   Heparin gtt   Troponin trend  Pain management

## 2023-11-03 NOTE — ED QUICK NOTES
Orders for admission, patient is aware of plan and ready to go upstairs. Any questions, please call ED RN Martita Gordon at extension [phone].      Patient Covid vaccination status: Fully vaccinated     COVID Test Ordered in ED: None    COVID Suspicion at Admission: N/A    Running Infusions:    sodium chloride 125 mL/hr (11/02/23 2234)      Heparin 20 mL/hr     Mental Status/LOC at time of transport: A&O x3     Other pertinent information:   CIWA score: N/A   NIH score:  N/A

## 2023-11-03 NOTE — PLAN OF CARE
Patient Samara Lawrence. On 2l/nc. NSR on tele. C/o mid chest pain,medicated with morphine with relief. Remains on heparin drip. IVF infusing. Continent of bowel and bladder. Call light within reach. Plan of care updated.   Problem: Diabetes/Glucose Control  Goal: Glucose maintained within prescribed range  Description: INTERVENTIONS:  - Monitor Blood Glucose as ordered  - Assess for signs and symptoms of hyperglycemia and hypoglycemia  - Administer ordered medications to maintain glucose within target range  - Assess barriers to adequate nutritional intake and initiate nutrition consult as needed  - Instruct patient on self management of diabetes  11/3/2023 1006 by Bruno Burnett RN  Outcome: Progressing  11/3/2023 1006 by Bruno Burnett RN  Outcome: Progressing     Problem: RESPIRATORY - ADULT  Goal: Achieves optimal ventilation and oxygenation  Description: INTERVENTIONS:  - Assess for changes in respiratory status  - Assess for changes in mentation and behavior  - Position to facilitate oxygenation and minimize respiratory effort  - Oxygen supplementation based on oxygen saturation or ABGs  - Provide Smoking Cessation handout, if applicable  - Encourage broncho-pulmonary hygiene including cough, deep breathe, Incentive Spirometry  - Assess the need for suctioning and perform as needed  - Assess and instruct to report SOB or any respiratory difficulty  - Respiratory Therapy support as indicated  - Manage/alleviate anxiety  - Monitor for signs/symptoms of CO2 retention  Outcome: Progressing

## 2023-11-03 NOTE — CM/SW NOTE
11/03/23 1200   CM/SW Referral Data   Referral Source Social Work (self-referral)   Reason for Referral Discharge planning   Informant EMR;Clinical Staff Member   Patient Status Prior to Admission   [facility] place [facility] to [facility]   Discharge Needs   Anticipated D/C needs Home health care       Chart reviewed for discharge planning. Pt with recent CABG surgery w/ Dr Ronald Yeboah. SW spoke with [facility]. Confirmed pt is still current w/ them for services. Updates sent in Aidin. Pt is observation status. Will need SMION orders (for RN and PT) if pt discharges. [address]  P: [phone]  F: [facility], [facility]  Discharge Planner  [id number]    Addendum: pt switched to inpatient status. SIMON order sent to [facility].

## 2023-11-03 NOTE — DISCHARGE INSTRUCTIONS
Resume home health care with [facility] at discharge    [address]: [phone]  F: [phone]       [address] at [facility] [address] [facility]    [place] at [place] [id number]       Over-The-Counter Miralax 17g recommended by Dr. Dori Rosas

## 2023-11-03 NOTE — PLAN OF CARE
Repeat troponin this morning flat. Echocardiogram unchanged. Heparin no longer needed for ACS. She has a PE and we will continue heparin for this. Continue ASA and statin. No further treatment from a cardiology standpoint.

## 2023-11-03 NOTE — ED INITIAL ASSESSMENT (HPI)
Left sided chest pain and abdominal pain started this morning around 5am. Triple bypass 10/11 released 10/16. Nausea.  Denies SOB

## 2023-11-04 LAB
APTT PPP: 116.4 SECONDS (ref 23.3–35.6)
APTT PPP: 79.1 SECONDS (ref 23.3–35.6)
BASOPHILS # BLD AUTO: 0.04 X10(3) UL (ref 0–0.2)
BASOPHILS NFR BLD AUTO: 0.5 %
EOSINOPHIL # BLD AUTO: 0.49 X10(3) UL (ref 0–0.7)
EOSINOPHIL NFR BLD AUTO: 5.9 %
ERYTHROCYTE [DISTWIDTH] IN BLOOD BY AUTOMATED COUNT: 13.8 %
GLUCOSE BLD-MCNC: 117 MG/DL (ref 70–99)
GLUCOSE BLD-MCNC: 120 MG/DL (ref 70–99)
GLUCOSE BLD-MCNC: 124 MG/DL (ref 70–99)
GLUCOSE BLD-MCNC: 134 MG/DL (ref 70–99)
HCT VFR BLD AUTO: 32.6 %
HGB BLD-MCNC: 11.1 G/DL
IMM GRANULOCYTES # BLD AUTO: 0.02 X10(3) UL (ref 0–1)
IMM GRANULOCYTES NFR BLD: 0.2 %
LYMPHOCYTES # BLD AUTO: 3.04 X10(3) UL (ref 1–4)
LYMPHOCYTES NFR BLD AUTO: 36.7 %
MCH RBC QN AUTO: 28.4 PG (ref 26–34)
MCHC RBC AUTO-ENTMCNC: 34 G/DL (ref 31–37)
MCV RBC AUTO: 83.4 FL
MONOCYTES # BLD AUTO: 0.87 X10(3) UL (ref 0.1–1)
MONOCYTES NFR BLD AUTO: 10.5 %
NEUTROPHILS # BLD AUTO: 3.83 X10 (3) UL (ref 1.5–7.7)
NEUTROPHILS # BLD AUTO: 3.83 X10(3) UL (ref 1.5–7.7)
NEUTROPHILS NFR BLD AUTO: 46.2 %
PLATELET # BLD AUTO: 267 10(3)UL (ref 150–450)
RBC # BLD AUTO: 3.91 X10(6)UL
TROPONIN I SERPL HS-MCNC: 214 NG/L
WBC # BLD AUTO: 8.3 X10(3) UL (ref 4–11)

## 2023-11-04 PROCEDURE — 99233 SBSQ HOSP IP/OBS HIGH 50: CPT | Performed by: HOSPITALIST

## 2023-11-04 RX ORDER — POLYETHYLENE GLYCOL 3350 17 G/17G
17 POWDER, FOR SOLUTION ORAL DAILY
Status: DISCONTINUED | OUTPATIENT
Start: 2023-11-04 | End: 2023-11-05

## 2023-11-04 NOTE — CM/SW NOTE
RN faxed Eliquis to patient's [employer] ([employer]). Eliquis over $800, requires a prior auth. [employer] ([employer]) faxing info. Patient could use coupons if needed. GI consult pending. Unsure when patient will be ready for discharge. SW/CM to remain available for support and/or discharge planning.     KENDALL Jon  Discharge Planner  [phone]

## 2023-11-04 NOTE — PLAN OF CARE
Acquired patient around 0730. Alert and oriented x4. On 1L NC. SpO2 within normal limits. NSR on tele. heparin gtt. Patient denies SOB. Patient complains of consistent left upper quadrant pain. IV morphine 2mg administered per MAR. This RN educated patient on starting with PO pain medication before providing IV pain medication. Pt refused PO pain  medication and insisted on IV pain medication. GI consulted. Call light within reach. Continue plan of care.      1715: tap water enema x1 order per MD. Pt tolerated 750 cc    Problem: Diabetes/Glucose Control  Goal: Glucose maintained within prescribed range  Description: INTERVENTIONS:  - Monitor Blood Glucose as ordered  - Assess for signs and symptoms of hyperglycemia and hypoglycemia  - Administer ordered medications to maintain glucose within target range  - Assess barriers to adequate nutritional intake and initiate nutrition consult as needed  - Instruct patient on self management of diabetes  Outcome: Progressing     Problem: Patient/Family Goals  Goal: Patient/Family Long Term Goal  Description: Patient's Long Term Goal: Patient denies of pain    Interventions:  - encourage patient to rate pain using appropriate pain scale   - See additional Care Plan goals for specific interventions  Outcome: Progressing  Goal: Patient/Family Short Term Goal  Description: Patient's Short Term Goal: patient verbalizes conformability     Interventions:   - encourage using non-pharmacological interventions  - See additional Care Plan goals for specific interventions  Outcome: Progressing     Problem: RESPIRATORY - ADULT  Goal: Achieves optimal ventilation and oxygenation  Description: INTERVENTIONS:  - Assess for changes in respiratory status  - Assess for changes in mentation and behavior  - Position to facilitate oxygenation and minimize respiratory effort  - Oxygen supplementation based on oxygen saturation or ABGs  - Provide Smoking Cessation handout, if applicable  - Encourage broncho-pulmonary hygiene including cough, deep breathe, Incentive Spirometry  - Assess the need for suctioning and perform as needed  - Assess and instruct to report SOB or any respiratory difficulty  - Respiratory Therapy support as indicated  - Manage/alleviate anxiety  - Monitor for signs/symptoms of CO2 retention  Outcome: Progressing

## 2023-11-05 ENCOUNTER — APPOINTMENT (OUTPATIENT)
Dept: ULTRASOUND IMAGING | Facility: HOSPITAL | Age: 63
End: 2023-11-05
Attending: INTERNAL MEDICINE
Payer: COMMERCIAL

## 2023-11-05 VITALS
BODY MASS INDEX: 39.83 KG/M2 | WEIGHT: 253.75 LBS | HEIGHT: 67 IN | TEMPERATURE: 98 F | RESPIRATION RATE: 18 BRPM | OXYGEN SATURATION: 95 % | DIASTOLIC BLOOD PRESSURE: 68 MMHG | HEART RATE: 77 BPM | SYSTOLIC BLOOD PRESSURE: 144 MMHG

## 2023-11-05 LAB
APTT PPP: 74.3 SECONDS (ref 23.3–35.6)
GLUCOSE BLD-MCNC: 115 MG/DL (ref 70–99)
GLUCOSE BLD-MCNC: 129 MG/DL (ref 70–99)
GLUCOSE BLD-MCNC: 133 MG/DL (ref 70–99)
PLATELET # BLD AUTO: 273 10(3)UL (ref 150–450)

## 2023-11-05 PROCEDURE — 93975 VASCULAR STUDY: CPT | Performed by: INTERNAL MEDICINE

## 2023-11-05 PROCEDURE — 76700 US EXAM ABDOM COMPLETE: CPT | Performed by: INTERNAL MEDICINE

## 2023-11-05 PROCEDURE — 99239 HOSP IP/OBS DSCHRG MGMT >30: CPT | Performed by: HOSPITALIST

## 2023-11-05 RX ORDER — DICYCLOMINE HCL 20 MG
20 TABLET ORAL EVERY 4 HOURS PRN
Status: SHIPPED | COMMUNITY
Start: 2023-11-05 | End: 2023-11-05

## 2023-11-05 RX ORDER — DICYCLOMINE HYDROCHLORIDE 10 MG/1
10 CAPSULE ORAL
Status: DISCONTINUED | OUTPATIENT
Start: 2023-11-05 | End: 2023-11-05

## 2023-11-05 RX ORDER — PANTOPRAZOLE SODIUM 40 MG/1
40 TABLET, DELAYED RELEASE ORAL
Qty: 30 TABLET | Refills: 0 | Status: SHIPPED | OUTPATIENT
Start: 2023-11-06 | End: 2023-12-06

## 2023-11-05 RX ORDER — DICYCLOMINE HCL 20 MG
20 TABLET ORAL EVERY 4 HOURS PRN
Qty: 30 TABLET | Refills: 0 | Status: SHIPPED | OUTPATIENT
Start: 2023-11-05

## 2023-11-05 NOTE — DISCHARGE SUMMARY
[facility]  DISCHARGE SUMMARY     Elisa Abebe Patient Status:  Inpatient    10/13/1960 MRN [id number]   [facility] 8NE-A Attending No att. providers found   Hosp Day # 2 PCP Demetrius Meeks MD     Date of Admission: 2023  Date of Discharge:  2023     Discharge Disposition: Home or Self Care    Discharge Diagnosis:  Pulmonary embolism  Intractable abdominal pain  CAD with recent CABG 10/12/23  Demand ischemia   DMII  CKDIII  GERD    History of Present Illness: Elisa Abebe is a 66year old female with history of coronary disease with recent three-vessel bypass earlier this month, hypertension, hyperlipidemia, LUCILLE, obesity presents emergency room with chest pain as well as some shortness of breath that started after eating a pancake this morning pain is in the left upper quadrant radiates to the back. No fevers, chills, nausea, vomiting, diarrhea, constipation. Patient is the pain is sharp in nature rates it a 10 out of 10 no exacerbating or remitting factors. Patient denies any hemoptysis, hematemesis, melena, hematochezia. Brief Synopsis: Patient admitted with acute PE. Patient placed on heparin gtt and transitioned to DOAC at discharge. Venous dopplers negative. Patient seen by cardiology. Troponin elevation presumed due to demand ischemia. Patient seen by GI due to intractable abdominal pain. PPI started, no acute endoscopic evaluation recommended at this time. Patient discharged home in stable condition. Lace+ Score: 61  59-90 High Risk  29-58 Medium Risk  0-28   Low Risk  Patient was referred to the [facility]. TCM Follow-Up Recommendation:  LACE > 58:  High Risk of readmission after discharge from the hospital.      Procedures during hospitalization:   None    Incidental or significant findings and recommendations (brief descriptions):  None    Lab/Test results pending at Discharge:   None    Consultants:  Cardiology  GI    Discharge Medication List:     Discharge Medications        START taking these medications        Instructions Prescription details   pantoprazole 40 MG Tbec  Commonly known as: Protonix      Take 1 tablet (40 mg total) by mouth every morning before breakfast.   Stop taking on: December 6, 2023  Quantity: 30 tablet  Refills: 0     rivaroxaban 15 MG Tabs  Commonly known as: Xarelto  Notes to patient: Take for 21 days       Take 1 tablet (15 mg total) by mouth 2 (two) times daily with meals for 21 days. Stop taking on: November 26, 2023  Quantity: 42 tablet  Refills: 0     rivaroxaban 20 MG Tabs  Commonly known as: Xarelto  Start taking on: November 27, 2023  Notes to patient: Start taking on day 22      Take 1 tablet (20 mg total) by mouth nightly. Stop taking on: February 5, 2024  Quantity: 70 tablet  Refills: 0            CHANGE how you take these medications        Instructions Prescription details   Irbesartan 300 MG Tabs  What changed: when to take this      TAKE 1 TABLET(300 MG) BY MOUTH DAILY   Quantity: 90 tablet  Refills: 0     metoprolol succinate  MG Tb24  Commonly known as: Toprol XL  What changed: when to take this      Take 1 tablet (100 mg total) by mouth daily. Quantity: 90 tablet  Refills: 0            CONTINUE taking these medications        Instructions Prescription details   aspirin 81 MG Tbec      Take 1 tablet (81 mg total) by mouth daily. Quantity: 30 tablet  Refills: 2     dicyclomine 20 MG Tabs  Commonly known as: Bentyl      Take 1 tablet (20 mg total) by mouth every 4 (four) hours as needed. Quantity: 30 tablet  Refills: 0     furosemide 40 MG Tabs  Commonly known as: Lasix      Take 0.5 tablets (20 mg total) by mouth daily. Refills: 0     HYDROcodone-acetaminophen 5-325 MG Tabs  Commonly known as: Norco      Take 1-2 tablets by mouth every 6 (six) hours as needed.    Quantity: 30 tablet  Refills: 0     methocarbamol 500 MG Tabs  Commonly known as: Robaxin      Take 1 tablet (500 mg total) by mouth 3 (three) times daily as needed. Quantity: 30 tablet  Refills: 1     One-A-Day Womens 50+ Advantage Tabs      Take 1 tablet by mouth daily. Refills: 0     Hair/Skin/Nails Tabs      Take 1 tablet by mouth daily. Refills: 0     OneTouch Delica Lancets Fine Misc      Test twice a day   Quantity: 50 each  Refills: 6     OneTouch Verio Flex System w/Device Kit      As directed   Quantity: 1 kit  Refills: 0     OneTouch Verio Strp      Test fasting and before dinner   Quantity: 50 strip  Refills: 6     rosuvastatin 20 MG Tabs  Commonly known as: Crestor      Take 1 tablet (20 mg total) by mouth nightly.    Quantity: 30 tablet  Refills: 3            STOP taking these medications      cephalexin 500 MG Caps  Commonly known as: Keflex        ibuprofen 600 MG Tabs  Commonly known as: Motrin                  Where to Get Your Medications        These medications were sent to [address], [phone], [phone]  [address] [facility], [facility] [address] [id number]      Phone: [phone]   dicyclomine 20 MG Tabs  pantoprazole 40 MG Tbec       Please  your prescriptions at the location directed by your doctor or nurse    Bring a paper prescription for each of these medications  rivaroxaban 15 MG Tabs  rivaroxaban 20 MG Tabs         [place]    Follow-up appointment:   Anna Lawson MD  [address] [place] [address]  [phone]    Follow up in 1 month(s)  Our office will contact you with an appointment    Zilphia Barthel, [address] [place] [address] [place] [phone]    Call  office will call you to schedule follow up    Appointments for Next 30 Days 11/6/2023 - 12/6/2023        Date Arrival Time Visit Type Length Department Provider     11/8/2023 10:15 AM  POST OP [32312] 15 min [facility], [place], [place]    Patient Instructions:                    11/9/2023 4:00 PM  [facility] CARDIAC REHAB PHASE II INI [8] 60 min [facility] Cardiopulmonary Rehabilitation CARD PULM INITIAL    Patient Instructions:     Check with Insurance for coverage                                      First visit 1 1/2 hours                                                                   Dress comfortably                                                                                                                                                Location Instructions: Your appointment is scheduled at [facility]. The address is&nbsp; [address] [facility]. [address], [place]. To reach Registration, park in the [address] [place] [address] [place]. Go through the entrance doors located on the ground floor. Elise Spillers left past the Information Desk and proceed through the lobby past the staircase to check in at the Cardiopulmonary Rehab Registration desk. Masks are optional for all patients and visitors, unless otherwise indicated. 2023  8:40 AM  FOLLOW-UP OV [99914] 20 min [facility],  Nate Alston MD    Patient Instructions:     Please arrive 15 minutes prior to your scheduled appointment time. -----------------------------------------------------------------------------------------------  PATIENT DISCHARGE INSTRUCTIONS: See electronic chart    Belinda Yeboah DO    Total time spent on discharge plannin minutes     The [facility] makes medical notes like these available to patients in the interest of transparency. Please be advised this is a medical document. Medical documents are intended to carry relevant information, facts as evident, and the clinical opinion of the practitioner. The medical note is intended as peer to peer communication and may appear blunt or direct. It is written in medical language and may contain abbreviations or verbiage that are unfamiliar.

## 2023-11-05 NOTE — CM/SW NOTE
Script for xarelto faxed to patient's [facility]'s (fax  [phone])--will call shortly and request cost as well as availability    Phone  [phone]

## 2023-11-05 NOTE — PLAN OF CARE
Acquired patient around 0730. Alert and oriented x4. On RA. SpO2 within normal limits. NSR on tele. Heparin gtt. DOAC Leigh Ann Sotelo) script. Pt c/o upper left abdominal pain rating 8 on a scale of 0-10. PRN pain medication provided. US abd in AM. Denies of SOB. Continent of bowel and bladder. Call light within reach. Continue plan of care.           Problem: Diabetes/Glucose Control  Goal: Glucose maintained within prescribed range  Description: INTERVENTIONS:  - Monitor Blood Glucose as ordered  - Assess for signs and symptoms of hyperglycemia and hypoglycemia  - Administer ordered medications to maintain glucose within target range  - Assess barriers to adequate nutritional intake and initiate nutrition consult as needed  - Instruct patient on self management of diabetes  Outcome: Progressing     Problem: Patient/Family Goals  Goal: Patient/Family Long Term Goal  Description: Patient's Long Term Goal: Patient denies of pain    Interventions:  - encourage patient to rate pain using appropriate pain scale   - See additional Care Plan goals for specific interventions  Outcome: Progressing  Goal: Patient/Family Short Term Goal  Description: Patient's Short Term Goal: patient verbalizes conformability     Interventions:   - encourage using non-pharmacological interventions  - See additional Care Plan goals for specific interventions  Outcome: Progressing     Problem: RESPIRATORY - ADULT  Goal: Achieves optimal ventilation and oxygenation  Description: INTERVENTIONS:  - Assess for changes in respiratory status  - Assess for changes in mentation and behavior  - Position to facilitate oxygenation and minimize respiratory effort  - Oxygen supplementation based on oxygen saturation or ABGs  - Provide Smoking Cessation handout, if applicable  - Encourage broncho-pulmonary hygiene including cough, deep breathe, Incentive Spirometry  - Assess the need for suctioning and perform as needed  - Assess and instruct to report SOB or any respiratory difficulty  - Respiratory Therapy support as indicated  - Manage/alleviate anxiety  - Monitor for signs/symptoms of CO2 retention  Outcome: Progressing

## 2023-11-05 NOTE — PLAN OF CARE
Assumed care of pt at [facility]. Pt A&Ox 4; seemingly anxious. On RA with occasional use of 2L NC. NSR on tele; no c/o cardiac symptoms. Pt continent of B&B; voiding without difficulty last BM reported today after use of bowel regimen including tap water enema. Pt notified about needing stool sample. Pt denies chest pain but c/o abdominal discomfort; PRN pain medication given as ordered, seems to be resting comfortably at this time. Pt states \"when taking norco and tramadol they make me loopy\" Pt educated on constipation with narcotic use and use of Morphine vs PO; verbalized and understood by pt. Pt stated to PCT after conversation, \"if I get loopy I'm about to go off on someone\". Pt up with SBA, tolerating well. POC cont heparin per PE protocol. US abdomen. Price Eliquis. Pain management. Plan of care reviewed with pt; all questions answered at this time and all needs met. Bed in lowest position; call light within reach. High fall risk precautions are in place per unit protocol.      Problem: Diabetes/Glucose Control  Goal: Glucose maintained within prescribed range  Description: INTERVENTIONS:  - Monitor Blood Glucose as ordered  - Assess for signs and symptoms of hyperglycemia and hypoglycemia  - Administer ordered medications to maintain glucose within target range  - Assess barriers to adequate nutritional intake and initiate nutrition consult as needed  - Instruct patient on self management of diabetes  Outcome: Progressing     Problem: Patient/Family Goals  Goal: Patient/Family Long Term Goal  Description: Patient's Long Term Goal: Patient denies of pain    Interventions:  - encourage patient to rate pain using appropriate pain scale   - See additional Care Plan goals for specific interventions  Outcome: Progressing  Goal: Patient/Family Short Term Goal  Description: Patient's Short Term Goal: patient verbalizes conformability     Interventions:   - encourage using non-pharmacological interventions  - See additional Care Plan goals for specific interventions  Outcome: Progressing     Problem: RESPIRATORY - ADULT  Goal: Achieves optimal ventilation and oxygenation  Description: INTERVENTIONS:  - Assess for changes in respiratory status  - Assess for changes in mentation and behavior  - Position to facilitate oxygenation and minimize respiratory effort  - Oxygen supplementation based on oxygen saturation or ABGs  - Provide Smoking Cessation handout, if applicable  - Encourage broncho-pulmonary hygiene including cough, deep breathe, Incentive Spirometry  - Assess the need for suctioning and perform as needed  - Assess and instruct to report SOB or any respiratory difficulty  - Respiratory Therapy support as indicated  - Manage/alleviate anxiety  - Monitor for signs/symptoms of CO2 retention  Outcome: Progressing

## 2023-11-06 ENCOUNTER — PATIENT OUTREACH (OUTPATIENT)
Dept: CASE MANAGEMENT | Age: 63
End: 2023-11-06

## 2023-11-06 ENCOUNTER — TELEPHONE (OUTPATIENT)
Dept: INTERNAL MEDICINE CLINIC | Facility: CLINIC | Age: 63
End: 2023-11-06

## 2023-11-06 DIAGNOSIS — Z02.9 ENCOUNTERS FOR UNSPECIFIED ADMINISTRATIVE PURPOSE: Primary | ICD-10-CM

## 2023-11-06 DIAGNOSIS — R79.89 ELEVATED TROPONIN: ICD-10-CM

## 2023-11-06 NOTE — PROGRESS NOTES
Explained discharge instructions including medications and follow ups to the patient, verbalize understanding. Xarelto and Protonix scripts ready for pickup at [facility] on [address] d/t medication availability; Bentyl at pt;s original preferred [facility] on [address] d/t medication availability- pt aware and agreeable. PIV removed, tele monitor discontinued. 1845:  arrived at bedside upset with RN and PCT in [facility]- states \"the hospital\" called him at 2pm saying pt was ready to dc but pt was not ready to dc until ~6pm d/t issues with getting Xarelto priced out and filled. Unclear who he received a call from as it was not this RN.  helped pt into wheelchair and insisted he wheel her down to the car himself, refused two attempts from staff to aid in wheeling pt off unit for dc home.

## 2023-11-06 NOTE — TELEPHONE ENCOUNTER
Spoke to pt for TCM today. Pt does not have an appointment scheduled at this time. HFU appt recommended by 11/9/23 as pt is a high risk for readmission. Please advise. BOOK BY DATE (last date for TCM): 11/19/23    Clinical staff:  Please f/u with pt and try to get them to schedule as pt would greatly benefit from TCM appt. Thank you! Pt would like to follow up with Cardiology. If needed she will f/u with PCP after. Please discuss with PCP if okay to follow up with specialist only. Thank you.

## 2023-11-06 NOTE — TELEPHONE ENCOUNTER
Future Appointments   Date Time Provider Mic Mota   11/8/2023 10:15 AM TORREY Dozier CSA [facility] CSA   11/9/2023  4:00 PM CARD PULM INITIAL [address]   11/15/2023 11:00 AM Liana Mercado MD EMG 8 [facility] [place]   11/29/2023  8:40 AM Philip Queen MD SGINP E[facility] SUB GI   1/26/2024 11:30 AM Liana Mercado MD EMG 8 [facility] [place]       Pt scheduled for next week, pt stated too many appts this week.

## 2023-11-06 NOTE — PROGRESS NOTES
Initial Post Discharge Follow Up   Discharge Date: 11/5/23  Contact Date: 11/6/2023    Consent Verification:  Assessment Completed With: Patient  HIPAA Verified? Yes    Discharge Dx:   Pulmonary embolism  Intractable abdominal pain  CAD with recent CABG 10/12/23  Demand ischemia   DMII  CKDIII  GERD    General:   How have you been since your discharge from the hospital?  Spoke with patient states she is feeling okay. Pt denies any shortness of breath or chest pain. Pt denies any fevers, chills, nausea, vomiting or any other symptoms. Pt is taking pain medications as prescribed. Pt states she feels pretty sore in her chest, but no pain. Do you have any pain since discharge? No    How well was your pain managed while in the hospital?   On a scale of 1-5   1- Very Poor and 5- Very well   5  When you were leaving the hospital were your discharge instructions reviewed with you? Yes  How well were your discharge instructions explained to you? On a scale of 1-5   1- Very Poor and 5- Very well   5  Do you have any questions about your discharge instructions? No  Before leaving the hospital was your diagnoses explained to you? Yes  Do you have any questions about your diagnoses? No  Are you able to perform normal daily activities of living as you have prior to your hospital stay (dressing, bathing, ambulating to the bathroom, etc)? yes  (NCM) Was patient given a different diet per AVS? no      Medications: Reviewed medication list.  Medications are up to date. Current Outpatient Medications   Medication Sig Dispense Refill    pantoprazole 40 MG Oral Tab EC Take 1 tablet (40 mg total) by mouth every morning before breakfast. 30 tablet 0    dicyclomine 20 MG Oral Tab Take 1 tablet (20 mg total) by mouth every 4 (four) hours as needed. 30 tablet 0    rivaroxaban 15 MG Oral Tab Take 1 tablet (15 mg total) by mouth 2 (two) times daily with meals for 21 days.  42 tablet 0    [START ON 11/27/2023] rivaroxaban 20 MG Oral Tab Take 1 tablet (20 mg total) by mouth nightly. 70 tablet 0    Blood Glucose Monitoring Suppl (ONETOUCH VERIO FLEX SYSTEM) w/Device Does not apply Kit As directed 1 kit 0    Glucose Blood (ONETOUCH VERIO) In Vitro Strip Test fasting and before dinner 50 strip 6    OneTouch Delica Lancets Fine Does not apply Misc Test twice a day 50 each 6    furosemide 40 MG Oral Tab Take 0.5 tablets (20 mg total) by mouth daily. rosuvastatin 20 MG Oral Tab Take 1 tablet (20 mg total) by mouth nightly. 30 tablet 3    HYDROcodone-acetaminophen 5-325 MG Oral Tab Take 1-2 tablets by mouth every 6 (six) hours as needed. 30 tablet 0    metoprolol succinate  MG Oral Tablet 24 Hr Take 1 tablet (100 mg total) by mouth daily. (Patient taking differently: Take 1 tablet (100 mg total) by mouth every morning.) 90 tablet 0    methocarbamol 500 MG Oral Tab Take 1 tablet (500 mg total) by mouth 3 (three) times daily as needed. 30 tablet 1    IRBESARTAN 300 MG Oral Tab TAKE 1 TABLET(300 MG) BY MOUTH DAILY (Patient taking differently: Take 1 tablet (300 mg total) by mouth every morning.) 90 tablet 0    aspirin 81 MG Oral Tab EC Take 1 tablet (81 mg total) by mouth daily. 30 tablet 2    Multiple Vitamins-Minerals (ONE-A-DAY WOMENS 50+ ADVANTAGE) Oral Tab Take 1 tablet by mouth daily. Multiple Vitamins-Minerals (HAIR/SKIN/NAILS) Oral Tab Take 1 tablet by mouth daily. Were there any changes to your current medication(s) noted on the AVS? Yes  If so, were these medication changes discussed with you prior to leaving the hospital? Yes  If a new medication was prescribed:    Was the new medication's purpose & side effects reviewed? Yes  Do you have any questions about your new medication? No  Did you  your discharge medications when you left the hospital? Yes  Let's go over your medications together to make sure we are not missing anything.  Medications Reviewed  Are there any reasons that keep you from taking your medication as prescribed? No  Are you having any concerns with constipation? No  Did patient receive their flu shot (Sept-March)? No    Discharge medications reviewed/discussed/and reconciled against outpatient medications with patient. Any changes or updates to medications sent to PCP. Patient Acknowledged     Referrals/orders at D/C:  Referrals/orders placed at D/C? yes  What services:   Home health and PT   (If HH was ordered) Has HH been set up? Yes    If Yes: With Whom: [facility]    DME ordered at D/C? No      Discharge orders, AVS reviewed and discussed with patient. Any changes or updates to orders sent to PCP. Patient Acknowledged      SDOH:   Transportation Needs: No Transportation Needs (11/3/2023)      Transportation Needs          Lack of Transportation: No  Financial Resource Strain: Low Risk  (10/18/2023)      Financial Resource Strain          Difficulty of Paying Living Expenses: Not very hard          Med Affordability: No    Re-Admit:  Tell me what led up to your readmission: elevated troponin  Did you call your PCP office first? yes  Did you attempt to get in with your PCP?   no  Do you feel this could have been prevented? no           Follow up appointments:      Your appointments       Date & Time Appointment Department [facility])    Nov 08, 2023 10:15 AM CST Post Op with [facility], [facility])        Nov 09, 2023  4:00 PM CST CARDIAC REHAB PHASE II INITIAL with [address]  Cardiop[facility] ([address])    Check with Insurance for coverage                                      First visit 1 1/2 hours                                                                   Dress comfortably                                                                                                                                                Nov 29, 2023  8:40 AM CST Follow-Up OV with Misael Rodriguez MD [facility] ([facility])    Please arrive 15 minutes prior to your scheduled appointment time. Jan 26, 2024 11:30 AM CST Follow Up Visit with Joy Warren MD [address] [place] [address] Dr Mendoza, [address] ([facility])              [facility], [address] [facility]  [facility]  [address]tr[address]  [address] [address]  [address]  [place] [id number]  [address] [facility], [facility] [place]  [address] [facility] 100  [facility] [id number] [address] [facility]  [place] ([facility]) [facility]  [address] [facility] [address] [phone]            TCC  Was TCC ordered: Yes  Was TCC scheduled: No, Explainpt prefers to follow up with PCP      PCP (If no TCC appointment)  Does patient already have a PCP appointment scheduled? No  NCM Attempted to schedule PCP office HFU appointment with patient   If no appointment scheduled: Explainpt prefers to follow up with specialist.     Specialist    Does the patient have any other follow up appointment(s) needing to be scheduled? Yes  If yes: NCM reviewed upcoming specialist appointment with patient: Yes  Does the patient need assistance scheduling appointment(s): No    Is there any reason as to why you cannot make your appointment(s)? No     Needs post D/C:   Now that you are home, are there any needs or concerns you need addressed before your next visit with your PCP?  (DME, meds, questions, etc.): No    Interventions by NCM:   All discharge instructions reviewed with the patient. Reviewed when to call MD vs when to call 911 or go the ED. Educated patient on the importance of taking all meds as prescribed as well as close f/u with PCP/specialists.  Pt verbalized understanding and will contact the office with any further questions or concerns. Patient denies fevers, chills, nausea, vomiting, shortness of breath, chest pain, or any other symptoms at this time. NCM attempted to schedule HFU, patient declined will call PCP/TCC office directly. NCM sent TE to PCP office re: assistance in scheduling HFU appt. NCM provided contact information for any further questions/concerns. Patient verbalized understanding and agreeable. Overall Rating:    How would you rate the care you received while in the hospital? good    CCM referral placed:    No    BOOK BY DATE: 11/18/23

## 2023-11-09 ENCOUNTER — CARDPULM VISIT (OUTPATIENT)
Dept: CARDIAC REHAB | Facility: HOSPITAL | Age: 63
End: 2023-11-09
Attending: INTERNAL MEDICINE
Payer: COMMERCIAL

## 2023-11-10 ENCOUNTER — TELEPHONE (OUTPATIENT)
Dept: INTERNAL MEDICINE CLINIC | Facility: CLINIC | Age: 63
End: 2023-11-10

## 2023-11-10 NOTE — TELEPHONE ENCOUNTER
Incoming fax from [facility] Cardiopulmonary Rehab    Placed in TO in basket for review

## 2023-11-15 ENCOUNTER — CARDPULM VISIT (OUTPATIENT)
Dept: CARDIAC REHAB | Facility: HOSPITAL | Age: 63
End: 2023-11-15
Attending: INTERNAL MEDICINE
Payer: COMMERCIAL

## 2023-11-15 ENCOUNTER — OFFICE VISIT (OUTPATIENT)
Dept: INTERNAL MEDICINE CLINIC | Facility: CLINIC | Age: 63
End: 2023-11-15
Payer: COMMERCIAL

## 2023-11-15 VITALS
SYSTOLIC BLOOD PRESSURE: 124 MMHG | HEART RATE: 89 BPM | TEMPERATURE: 98 F | BODY MASS INDEX: 39.27 KG/M2 | DIASTOLIC BLOOD PRESSURE: 80 MMHG | HEIGHT: 67 IN | WEIGHT: 250.19 LBS | OXYGEN SATURATION: 95 %

## 2023-11-15 DIAGNOSIS — I25.810 CORONARY ARTERY DISEASE INVOLVING OTHER CORONARY ARTERY BYPASS GRAFT, UNSPECIFIED WHETHER ANGINA PRESENT: ICD-10-CM

## 2023-11-15 DIAGNOSIS — R04.0 NOSEBLEED: ICD-10-CM

## 2023-11-15 DIAGNOSIS — E66.9 OBESITY (BMI 30-39.9): ICD-10-CM

## 2023-11-15 DIAGNOSIS — I26.94 MULTIPLE SUBSEGMENTAL PULMONARY EMBOLI WITHOUT ACUTE COR PULMONALE (HCC): Primary | ICD-10-CM

## 2023-11-15 DIAGNOSIS — R10.13 EPIGASTRIC PAIN: ICD-10-CM

## 2023-11-15 PROCEDURE — 93798 PHYS/QHP OP CAR RHAB W/ECG: CPT

## 2023-11-15 PROCEDURE — 3079F DIAST BP 80-89 MM HG: CPT | Performed by: FAMILY MEDICINE

## 2023-11-15 PROCEDURE — 90677 PCV20 VACCINE IM: CPT | Performed by: FAMILY MEDICINE

## 2023-11-15 PROCEDURE — 90686 IIV4 VACC NO PRSV 0.5 ML IM: CPT | Performed by: FAMILY MEDICINE

## 2023-11-15 PROCEDURE — 3074F SYST BP LT 130 MM HG: CPT | Performed by: FAMILY MEDICINE

## 2023-11-15 PROCEDURE — 99215 OFFICE O/P EST HI 40 MIN: CPT | Performed by: FAMILY MEDICINE

## 2023-11-15 PROCEDURE — 90471 IMMUNIZATION ADMIN: CPT | Performed by: FAMILY MEDICINE

## 2023-11-15 PROCEDURE — 3008F BODY MASS INDEX DOCD: CPT | Performed by: FAMILY MEDICINE

## 2023-11-15 PROCEDURE — 90472 IMMUNIZATION ADMIN EACH ADD: CPT | Performed by: FAMILY MEDICINE

## 2023-11-15 RX ORDER — METHOCARBAMOL 500 MG/1
500 TABLET, FILM COATED ORAL 3 TIMES DAILY PRN
Qty: 30 TABLET | Refills: 1 | Status: SHIPPED | OUTPATIENT
Start: 2023-11-15

## 2023-11-15 RX ORDER — CLOPIDOGREL BISULFATE 75 MG/1
TABLET ORAL
COMMUNITY
Start: 2022-08-01

## 2023-11-15 RX ORDER — RIVAROXABAN 15 MG-20MG
1 KIT ORAL AS DIRECTED
COMMUNITY
Start: 2023-11-05

## 2023-11-16 ENCOUNTER — ORDER TRANSCRIPTION (OUTPATIENT)
Dept: CARDIAC REHAB | Facility: HOSPITAL | Age: 63
End: 2023-11-16

## 2023-11-16 ENCOUNTER — TELEPHONE (OUTPATIENT)
Dept: INTERNAL MEDICINE CLINIC | Facility: CLINIC | Age: 63
End: 2023-11-16

## 2023-11-16 DIAGNOSIS — Z95.1 S/P CABG (CORONARY ARTERY BYPASS GRAFT): Primary | ICD-10-CM

## 2023-11-16 NOTE — TELEPHONE ENCOUNTER
PENDED letter, please sign if agree. Thanks! Spoke with Torres Dutta at [facility]. She stated they already have orders from Cardiologist, but since pt was seen by TO yesterday, they are requesting a letter of clearance. She needs it to say pt is cleared to resume cardiac rehab with no limitations.       Fax to:   [phone] sergey goodwin

## 2023-11-16 NOTE — TELEPHONE ENCOUNTER
Miguel A Swanson from cardiac rehab [facility] would like to request a release for pt to continue rehab.

## 2023-11-17 ENCOUNTER — CARDPULM VISIT (OUTPATIENT)
Dept: CARDIAC REHAB | Facility: HOSPITAL | Age: 63
End: 2023-11-17
Attending: INTERNAL MEDICINE
Payer: COMMERCIAL

## 2023-11-17 PROCEDURE — 93798 PHYS/QHP OP CAR RHAB W/ECG: CPT

## 2023-11-20 ENCOUNTER — CARDPULM VISIT (OUTPATIENT)
Dept: CARDIAC REHAB | Facility: HOSPITAL | Age: 63
End: 2023-11-20
Attending: INTERNAL MEDICINE
Payer: COMMERCIAL

## 2023-11-20 PROCEDURE — 93798 PHYS/QHP OP CAR RHAB W/ECG: CPT

## 2023-11-22 ENCOUNTER — CARDPULM VISIT (OUTPATIENT)
Dept: CARDIAC REHAB | Facility: HOSPITAL | Age: 63
End: 2023-11-22
Attending: INTERNAL MEDICINE
Payer: COMMERCIAL

## 2023-11-22 PROCEDURE — 93798 PHYS/QHP OP CAR RHAB W/ECG: CPT

## 2023-11-24 ENCOUNTER — CARDPULM VISIT (OUTPATIENT)
Dept: CARDIAC REHAB | Facility: HOSPITAL | Age: 63
End: 2023-11-24
Attending: INTERNAL MEDICINE
Payer: COMMERCIAL

## 2023-11-24 PROCEDURE — 93798 PHYS/QHP OP CAR RHAB W/ECG: CPT

## 2023-11-27 ENCOUNTER — CARDPULM VISIT (OUTPATIENT)
Dept: CARDIAC REHAB | Facility: HOSPITAL | Age: 63
End: 2023-11-27
Attending: INTERNAL MEDICINE
Payer: COMMERCIAL

## 2023-11-27 PROCEDURE — 93798 PHYS/QHP OP CAR RHAB W/ECG: CPT

## 2023-11-28 ENCOUNTER — TELEPHONE (OUTPATIENT)
Dept: INTERNAL MEDICINE CLINIC | Facility: CLINIC | Age: 63
End: 2023-11-28

## 2023-11-28 DIAGNOSIS — L76.82 INCISIONAL PAIN: Primary | ICD-10-CM

## 2023-11-28 NOTE — TELEPHONE ENCOUNTER
TO- Please advise; will you manage patient's xarelto/incision concerns or should follow with cardiology? Spoke with patient via phone for further triage. Patient has \"1-week supply of Xarelto left with starter pack that was given to her\", patient states \"instructions on box instruct her to contact physician at Day 22\". Explained this is more than likely to request refill or determine if medication should be continued. Patient also shared \"area around chest incision line is extremely sensitive- and is very uncomfortable when even her clothes touch the incision. \"  Patient denies any redness, swelling or drainage for incision line. Patient states she had a \"friend have the same thing and had seen a dermatologist that then did steroid injections to the area to help decrease sensitivity\".   Patient asking if you would recommend she see dermatologist and if so would like a referral.

## 2023-11-28 NOTE — TELEPHONE ENCOUNTER
Patient called and stated that she is taking Xarelto and was instructed to call us on \"Day 22\" of medication.      Please advise

## 2023-11-29 ENCOUNTER — CARDPULM VISIT (OUTPATIENT)
Dept: CARDIAC REHAB | Facility: HOSPITAL | Age: 63
End: 2023-11-29
Attending: INTERNAL MEDICINE
Payer: COMMERCIAL

## 2023-11-29 PROCEDURE — 93798 PHYS/QHP OP CAR RHAB W/ECG: CPT

## 2023-11-29 NOTE — TELEPHONE ENCOUNTER
Referral placed:   Wendy Nicolas, [facility] [phone]      Spoke with pt and explained that xarelto 20 mg was called in yesterday by Dr. Michelle Self. She v/u    Pt was notified about derm referral, but she has appt next week with cardio, so she thinks she'll wait to discuss with cardio. Pt also asked who she saw in the past for knee pain. Gave her contact info for Dr. Dennis Cormier.

## 2023-12-01 ENCOUNTER — CARDPULM VISIT (OUTPATIENT)
Dept: CARDIAC REHAB | Facility: HOSPITAL | Age: 63
End: 2023-12-01
Attending: INTERNAL MEDICINE
Payer: COMMERCIAL

## 2023-12-01 PROCEDURE — 93798 PHYS/QHP OP CAR RHAB W/ECG: CPT

## 2023-12-03 ENCOUNTER — HOSPITAL ENCOUNTER (OUTPATIENT)
Dept: GENERAL RADIOLOGY | Age: 63
Discharge: HOME | End: 2023-12-03
Attending: FAMILY MEDICINE
Payer: COMMERCIAL

## 2023-12-03 ENCOUNTER — HOSPITAL ENCOUNTER (OUTPATIENT)
Dept: GENERAL RADIOLOGY | Age: 63
End: 2023-12-03
Attending: FAMILY MEDICINE
Payer: COMMERCIAL

## 2023-12-03 DIAGNOSIS — M79.604 RIGHT LEG PAIN: ICD-10-CM

## 2023-12-03 PROCEDURE — 73552 X-RAY EXAM OF FEMUR 2/>: CPT | Performed by: FAMILY MEDICINE

## 2023-12-03 RX ORDER — FUROSEMIDE 40 MG/1
40 TABLET ORAL DAILY
Qty: 90 TABLET | Refills: 0 | Status: SHIPPED | OUTPATIENT
Start: 2023-12-03

## 2023-12-03 RX ORDER — IRBESARTAN 300 MG/1
300 TABLET ORAL DAILY
Qty: 90 TABLET | Refills: 0 | Status: SHIPPED | OUTPATIENT
Start: 2023-12-03

## 2023-12-04 ENCOUNTER — CARDPULM VISIT (OUTPATIENT)
Dept: CARDIAC REHAB | Facility: HOSPITAL | Age: 63
End: 2023-12-04
Attending: INTERNAL MEDICINE
Payer: COMMERCIAL

## 2023-12-04 ENCOUNTER — TELEPHONE (OUTPATIENT)
Dept: INTERNAL MEDICINE CLINIC | Facility: CLINIC | Age: 63
End: 2023-12-04

## 2023-12-04 PROCEDURE — 93798 PHYS/QHP OP CAR RHAB W/ECG: CPT

## 2023-12-04 NOTE — TELEPHONE ENCOUNTER
TO: please advise --    Pt requesting order for an MRI of the R Knee because the x-ray she had done yesterday didn't show any signs of a tear. Pt requesting to have this done before she see's Orthopedic on 12/6.       Xray result note 12/3:   Mild arthritic changes in the hip and knee, otherwise looks OK

## 2023-12-04 NOTE — TELEPHONE ENCOUNTER
Pt requesting order for an MRI of the R Knee because the x-ray she had done yesterday didn't show any signs of a tear. Pt requesting to have this done before she see's Orthopedic on 12/6. Advised pt turn around time is 24-48 hours.

## 2023-12-06 ENCOUNTER — CARDPULM VISIT (OUTPATIENT)
Dept: CARDIAC REHAB | Facility: HOSPITAL | Age: 63
End: 2023-12-06
Attending: INTERNAL MEDICINE
Payer: COMMERCIAL

## 2023-12-06 PROCEDURE — 93798 PHYS/QHP OP CAR RHAB W/ECG: CPT

## 2023-12-08 ENCOUNTER — APPOINTMENT (OUTPATIENT)
Dept: CARDIAC REHAB | Facility: HOSPITAL | Age: 63
End: 2023-12-08
Attending: INTERNAL MEDICINE
Payer: COMMERCIAL

## 2023-12-11 ENCOUNTER — APPOINTMENT (OUTPATIENT)
Dept: CARDIAC REHAB | Facility: HOSPITAL | Age: 63
End: 2023-12-11
Attending: INTERNAL MEDICINE
Payer: COMMERCIAL

## 2023-12-13 ENCOUNTER — APPOINTMENT (OUTPATIENT)
Dept: CARDIAC REHAB | Facility: HOSPITAL | Age: 63
End: 2023-12-13
Attending: INTERNAL MEDICINE
Payer: COMMERCIAL

## 2023-12-15 ENCOUNTER — APPOINTMENT (OUTPATIENT)
Dept: CARDIAC REHAB | Facility: HOSPITAL | Age: 63
End: 2023-12-15
Attending: INTERNAL MEDICINE
Payer: COMMERCIAL

## 2023-12-18 ENCOUNTER — CARDPULM VISIT (OUTPATIENT)
Dept: CARDIAC REHAB | Facility: HOSPITAL | Age: 63
End: 2023-12-18
Attending: INTERNAL MEDICINE
Payer: COMMERCIAL

## 2023-12-18 PROCEDURE — 93798 PHYS/QHP OP CAR RHAB W/ECG: CPT

## 2023-12-20 ENCOUNTER — CARDPULM VISIT (OUTPATIENT)
Dept: CARDIAC REHAB | Facility: HOSPITAL | Age: 63
End: 2023-12-20
Attending: INTERNAL MEDICINE
Payer: COMMERCIAL

## 2023-12-20 PROCEDURE — 93798 PHYS/QHP OP CAR RHAB W/ECG: CPT

## 2023-12-22 ENCOUNTER — CARDPULM VISIT (OUTPATIENT)
Dept: CARDIAC REHAB | Facility: HOSPITAL | Age: 63
End: 2023-12-22
Attending: INTERNAL MEDICINE
Payer: COMMERCIAL

## 2023-12-22 PROCEDURE — 93798 PHYS/QHP OP CAR RHAB W/ECG: CPT

## 2023-12-27 ENCOUNTER — APPOINTMENT (OUTPATIENT)
Dept: CARDIAC REHAB | Facility: HOSPITAL | Age: 63
End: 2023-12-27
Attending: INTERNAL MEDICINE
Payer: COMMERCIAL

## 2023-12-28 ENCOUNTER — TELEPHONE (OUTPATIENT)
Dept: ORTHOPEDICS CLINIC | Facility: CLINIC | Age: 63
End: 2023-12-28

## 2023-12-28 DIAGNOSIS — Z01.89 ENCOUNTER FOR LOWER EXTREMITY COMPARISON IMAGING STUDY: ICD-10-CM

## 2023-12-28 DIAGNOSIS — M25.561 RIGHT KNEE PAIN, UNSPECIFIED CHRONICITY: Primary | ICD-10-CM

## 2023-12-29 ENCOUNTER — CARDPULM VISIT (OUTPATIENT)
Dept: CARDIAC REHAB | Facility: HOSPITAL | Age: 63
End: 2023-12-29
Attending: INTERNAL MEDICINE
Payer: COMMERCIAL

## 2023-12-29 PROCEDURE — 93798 PHYS/QHP OP CAR RHAB W/ECG: CPT

## 2024-01-01 ENCOUNTER — MED REC SCAN ONLY (OUTPATIENT)
Dept: ORTHOPEDICS CLINIC | Facility: CLINIC | Age: 64
End: 2024-01-01

## 2024-01-02 ENCOUNTER — LAB ENCOUNTER (OUTPATIENT)
Dept: LAB | Age: 64
End: 2024-01-02
Attending: FAMILY MEDICINE
Payer: COMMERCIAL

## 2024-01-02 DIAGNOSIS — Z78.9 NORMAL TISSUE: ICD-10-CM

## 2024-01-02 DIAGNOSIS — Z00.00 LABORATORY EXAMINATION ORDERED AS PART OF A ROUTINE GENERAL MEDICAL EXAMINATION: ICD-10-CM

## 2024-01-02 DIAGNOSIS — I73.9 PERIPHERAL VASCULAR DISEASE, UNSPECIFIED (HCC): Primary | ICD-10-CM

## 2024-01-02 LAB
ALBUMIN SERPL-MCNC: 4 G/DL (ref 3.4–5)
ALBUMIN/GLOB SERPL: 1 {RATIO} (ref 1–2)
ALP LIVER SERPL-CCNC: 106 U/L
ALT SERPL-CCNC: 36 U/L
ANION GAP SERPL CALC-SCNC: 4 MMOL/L (ref 0–18)
AST SERPL-CCNC: 19 U/L (ref 15–37)
BASOPHILS # BLD AUTO: 0.05 X10(3) UL (ref 0–0.2)
BASOPHILS NFR BLD AUTO: 0.7 %
BILIRUB SERPL-MCNC: 0.6 MG/DL (ref 0.1–2)
BUN BLD-MCNC: 16 MG/DL (ref 9–23)
CALCIUM BLD-MCNC: 9.6 MG/DL (ref 8.5–10.1)
CHLORIDE SERPL-SCNC: 109 MMOL/L (ref 98–112)
CHOLEST SERPL-MCNC: 187 MG/DL (ref ?–200)
CO2 SERPL-SCNC: 27 MMOL/L (ref 21–32)
CREAT BLD-MCNC: 1.16 MG/DL
EGFRCR SERPLBLD CKD-EPI 2021: 54 ML/MIN/1.73M2 (ref 60–?)
EOSINOPHIL # BLD AUTO: 0.19 X10(3) UL (ref 0–0.7)
EOSINOPHIL NFR BLD AUTO: 2.8 %
ERYTHROCYTE [DISTWIDTH] IN BLOOD BY AUTOMATED COUNT: 14.7 %
EST. AVERAGE GLUCOSE BLD GHB EST-MCNC: 146 MG/DL (ref 68–126)
FASTING PATIENT LIPID ANSWER: NO
FASTING STATUS PATIENT QL REPORTED: NO
GLOBULIN PLAS-MCNC: 3.9 G/DL (ref 2.8–4.4)
GLUCOSE BLD-MCNC: 130 MG/DL (ref 70–99)
HBA1C MFR BLD: 6.7 % (ref ?–5.7)
HCT VFR BLD AUTO: 39.1 %
HDLC SERPL-MCNC: 58 MG/DL (ref 40–59)
HGB BLD-MCNC: 13 G/DL
IMM GRANULOCYTES # BLD AUTO: 0.02 X10(3) UL (ref 0–1)
IMM GRANULOCYTES NFR BLD: 0.3 %
LDLC SERPL CALC-MCNC: 105 MG/DL (ref ?–100)
LYMPHOCYTES # BLD AUTO: 2.44 X10(3) UL (ref 1–4)
LYMPHOCYTES NFR BLD AUTO: 35.4 %
MCH RBC QN AUTO: 28.1 PG (ref 26–34)
MCHC RBC AUTO-ENTMCNC: 33.2 G/DL (ref 31–37)
MCV RBC AUTO: 84.4 FL
MONOCYTES # BLD AUTO: 0.58 X10(3) UL (ref 0.1–1)
MONOCYTES NFR BLD AUTO: 8.4 %
NEUTROPHILS # BLD AUTO: 3.61 X10 (3) UL (ref 1.5–7.7)
NEUTROPHILS # BLD AUTO: 3.61 X10(3) UL (ref 1.5–7.7)
NEUTROPHILS NFR BLD AUTO: 52.4 %
NONHDLC SERPL-MCNC: 129 MG/DL (ref ?–130)
OSMOLALITY SERPL CALC.SUM OF ELEC: 293 MOSM/KG (ref 275–295)
PLATELET # BLD AUTO: 302 10(3)UL (ref 150–450)
POTASSIUM SERPL-SCNC: 4.1 MMOL/L (ref 3.5–5.1)
PROT SERPL-MCNC: 7.9 G/DL (ref 6.4–8.2)
RBC # BLD AUTO: 4.63 X10(6)UL
SODIUM SERPL-SCNC: 140 MMOL/L (ref 136–145)
TRIGL SERPL-MCNC: 140 MG/DL (ref 30–149)
TSI SER-ACNC: 1.65 MIU/ML (ref 0.36–3.74)
VLDLC SERPL CALC-MCNC: 24 MG/DL (ref 0–30)
WBC # BLD AUTO: 6.9 X10(3) UL (ref 4–11)

## 2024-01-02 PROCEDURE — 80061 LIPID PANEL: CPT

## 2024-01-02 PROCEDURE — 84443 ASSAY THYROID STIM HORMONE: CPT

## 2024-01-02 PROCEDURE — 36415 COLL VENOUS BLD VENIPUNCTURE: CPT

## 2024-01-02 PROCEDURE — 3044F HG A1C LEVEL LT 7.0%: CPT | Performed by: FAMILY MEDICINE

## 2024-01-02 PROCEDURE — 85025 COMPLETE CBC W/AUTO DIFF WBC: CPT

## 2024-01-02 PROCEDURE — 83036 HEMOGLOBIN GLYCOSYLATED A1C: CPT

## 2024-01-02 PROCEDURE — 82306 VITAMIN D 25 HYDROXY: CPT

## 2024-01-02 PROCEDURE — 80053 COMPREHEN METABOLIC PANEL: CPT

## 2024-01-03 ENCOUNTER — APPOINTMENT (OUTPATIENT)
Dept: CARDIAC REHAB | Facility: HOSPITAL | Age: 64
End: 2024-01-03
Attending: INTERNAL MEDICINE
Payer: COMMERCIAL

## 2024-01-03 LAB — VIT D+METAB SERPL-MCNC: 41.9 NG/ML (ref 30–100)

## 2024-01-03 PROCEDURE — 93798 PHYS/QHP OP CAR RHAB W/ECG: CPT

## 2024-01-04 ENCOUNTER — CARDPULM VISIT (OUTPATIENT)
Dept: CARDIAC REHAB | Facility: HOSPITAL | Age: 64
End: 2024-01-04
Attending: INTERNAL MEDICINE
Payer: COMMERCIAL

## 2024-01-04 PROCEDURE — 93798 PHYS/QHP OP CAR RHAB W/ECG: CPT

## 2024-01-05 ENCOUNTER — APPOINTMENT (OUTPATIENT)
Dept: CARDIAC REHAB | Facility: HOSPITAL | Age: 64
End: 2024-01-05
Attending: INTERNAL MEDICINE
Payer: COMMERCIAL

## 2024-01-05 ENCOUNTER — OFFICE VISIT (OUTPATIENT)
Dept: ORTHOPEDICS CLINIC | Facility: CLINIC | Age: 64
End: 2024-01-05
Payer: COMMERCIAL

## 2024-01-05 ENCOUNTER — HOSPITAL ENCOUNTER (OUTPATIENT)
Dept: GENERAL RADIOLOGY | Age: 64
Discharge: HOME | End: 2024-01-05
Attending: ORTHOPAEDIC SURGERY
Payer: COMMERCIAL

## 2024-01-05 ENCOUNTER — TELEPHONE (OUTPATIENT)
Dept: ORTHOPEDICS CLINIC | Facility: CLINIC | Age: 64
End: 2024-01-05

## 2024-01-05 VITALS — HEIGHT: 67 IN | BODY MASS INDEX: 39.24 KG/M2 | WEIGHT: 250 LBS

## 2024-01-05 DIAGNOSIS — M62.81 QUADRICEPS WEAKNESS: ICD-10-CM

## 2024-01-05 DIAGNOSIS — M25.461 EFFUSION OF RIGHT KNEE: Primary | ICD-10-CM

## 2024-01-05 DIAGNOSIS — M22.2X2 PATELLOFEMORAL ARTHRALGIA OF BOTH KNEES: ICD-10-CM

## 2024-01-05 DIAGNOSIS — M22.2X1 PATELLOFEMORAL ARTHRALGIA OF BOTH KNEES: ICD-10-CM

## 2024-01-05 DIAGNOSIS — Z01.89 ENCOUNTER FOR LOWER EXTREMITY COMPARISON IMAGING STUDY: ICD-10-CM

## 2024-01-05 DIAGNOSIS — M25.561 RIGHT KNEE PAIN, UNSPECIFIED CHRONICITY: ICD-10-CM

## 2024-01-05 PROCEDURE — 99214 OFFICE O/P EST MOD 30 MIN: CPT | Performed by: ORTHOPAEDIC SURGERY

## 2024-01-05 PROCEDURE — 20610 DRAIN/INJ JOINT/BURSA W/O US: CPT | Performed by: ORTHOPAEDIC SURGERY

## 2024-01-05 PROCEDURE — 3008F BODY MASS INDEX DOCD: CPT | Performed by: ORTHOPAEDIC SURGERY

## 2024-01-05 PROCEDURE — 73564 X-RAY EXAM KNEE 4 OR MORE: CPT | Performed by: ORTHOPAEDIC SURGERY

## 2024-01-05 PROCEDURE — 73562 X-RAY EXAM OF KNEE 3: CPT | Performed by: ORTHOPAEDIC SURGERY

## 2024-01-05 RX ORDER — TRIAMCINOLONE ACETONIDE 40 MG/ML
40 INJECTION, SUSPENSION INTRA-ARTICULAR; INTRAMUSCULAR ONCE
Status: COMPLETED | OUTPATIENT
Start: 2024-01-05 | End: 2024-01-05

## 2024-01-05 RX ADMIN — TRIAMCINOLONE ACETONIDE 40 MG: 40 INJECTION, SUSPENSION INTRA-ARTICULAR; INTRAMUSCULAR at 09:47:00

## 2024-01-05 NOTE — H&P
Orthopaedic Surgery  [address] [place] [id number]  [phone]       Name: Marquita Cooper   MRN: [id number]  Date: 1/5/2024     CC: Right Knee Pain    REFERRED BY: Adela Miller MD    HPI:   Marquita Cooper is a very pleasant 63 year old female who presents today for evaluation of right knee pain ongoing since 2021 with swelling and extreme pain that is limiting her ability bear weight.  This is gradually worsened and has made it challenging to walk.  Pain level is up to 8-9 out of 10.  She has night pain.    She is known to our team for undergoing treatment for left knee management.  This was successfully treated with intra-articular corticosteroid injection 2 years prior.    PMH:   Past Medical History:   Diagnosis Date    CAD (coronary artery disease)     Coronary atherosclerosis     Fatigue     High blood pressure     High cholesterol     Myocardial infarction (HCC) 05/01/2012    Other and unspecified hyperlipidemia     Peripheral vascular disease (HCC)     Sleep apnea     Stented coronary artery     Unspecified essential hypertension     Wears glasses     Weight gain        PAST SURGICAL HX:  Past Surgical History:   Procedure Laterality Date    CARDIAC SURGERY      stent    TUBAL LIGATION         FAMILY HX:  Family History   Problem Relation Age of Onset    Ovarian Cancer Paternal Grandmother     Heart Disease Father     Other (acute MI) Father     Other (CAD) Father     Other (Other) Mother     Other (stroke syndrome) Mother     Other (HTN) Sister     Stroke Neg     Cancer Neg        ALLERGIES:  Hydrochlorothiazide and Sulfa drugs cross reactors    MEDICATIONS:   Current Outpatient Medications   Medication Sig Dispense Refill    Irbesartan 300 MG Oral Tab Take 1 tablet (300 mg total) by mouth daily. 90 tablet 0    furosemide 40 MG Oral Tab Take 1 tablet (40 mg total) by mouth daily. PHYSICAL DUE NOW 90 tablet 0    XARELTO STARTER PACK 15 & 20 MG Oral Tablet Therapy Pack Take 1 tablet by mouth As  Directed. FOLLOW DIRECTIONS ON PACKAGING      clopidogrel 75 MG Oral Tab       methocarbamol 500 MG Oral Tab Take 1 tablet (500 mg total) by mouth 3 (three) times daily as needed. 30 tablet 1    dicyclomine 20 MG Oral Tab Take 1 tablet (20 mg total) by mouth every 4 (four) hours as needed. 30 tablet 0    rivaroxaban 20 MG Oral Tab Take 1 tablet (20 mg total) by mouth nightly. 70 tablet 0    Blood Glucose Monitoring Suppl (ONETOUCH VERIO FLEX SYSTEM) w/Device Does not apply Kit As directed 1 kit 0    Glucose Blood (ONETOUCH VERIO) In Vitro Strip Test fasting and before dinner 50 strip 6    OneTouch Delica Lancets Fine Does not apply Misc Test twice a day 50 each 6    rosuvastatin 20 MG Oral Tab Take 1 tablet (20 mg total) by mouth nightly. 30 tablet 3    HYDROcodone-acetaminophen 5-325 MG Oral Tab Take 1-2 tablets by mouth every 6 (six) hours as needed. 30 tablet 0    metoprolol succinate  MG Oral Tablet 24 Hr Take 1 tablet (100 mg total) by mouth daily. (Patient taking differently: Take 1 tablet (100 mg total) by mouth every morning.) 90 tablet 0    aspirin 81 MG Oral Tab EC Take 1 tablet (81 mg total) by mouth daily. 30 tablet 2    Multiple Vitamins-Minerals (ONE-A-DAY WOMENS 50+ ADVANTAGE) Oral Tab Take 1 tablet by mouth daily.      Multiple Vitamins-Minerals (HAIR/SKIN/NAILS) Oral Tab Take 1 tablet by mouth daily.         ROS: A comprehensive 14 point review of systems was performed and was negative aside from the aforementioned per history of present illness.    SOCIAL HX:  Social History     Tobacco Use    Smoking status: Former     Packs/day: 0.20     Years: 15.00     Additional pack years: 0.00     Total pack years: 3.00     Types: Cigarettes    Smokeless tobacco: Never    Tobacco comments:     Smokes 4 cigarettes daily.   Substance Use Topics    Alcohol use: Yes     Alcohol/week: 6.0 standard drinks of alcohol     Types: 3 Glasses of wine, 3 Shots of liquor per week     Comment: occ       PE:   Vitals:     01/05/24 0928   Weight: 250 lb (113.4 kg)   Height: 5' 7\" (1.702 m)     Estimated body mass index is 39.16 kg/m² as calculated from the following:    Height as of this encounter: 5' 7\" (1.702 m).    Weight as of this encounter: 250 lb (113.4 kg).    Physical Exam  Constitutional:       Appearance: Normal appearance.   HENT:      Head: Normocephalic and atraumatic.   Eyes:      Extraocular Movements: Extraocular movements intact.   Neck:      Musculoskeletal: Normal range of motion and neck supple.   Cardiovascular:      Pulses: Normal pulses.   Pulmonary:      Effort: Pulmonary effort is normal. No respiratory distress.   Abdominal:      General: There is no distension.   Skin:     General: Skin is warm.      Capillary Refill: Capillary refill takes less than 2 seconds.      Findings: No bruising.   Neurological:      General: No focal deficit present.      Mental Status: Alert.   Psychiatric:         Mood and Affect: Mood normal.     Examination of the right knee demonstrates:   Skin is intact, warm and dry.   Atrophy: mild    Effusion: moderate    Joint line tenderness: both  Crepitation: mild   Harvey: Equivocal  Patellar mobility: normal without apprehension  J-sign: none    ROM: Extension lacking less than 5 degrees  Flexion 90 degrees  ACL:  Negative Lachman, Negative Pivot Shift   PCL:  Negative Posterior Drawer  Collateral Ligaments: Stable to Varus and Valgus stress at 0 and 30 degrees  Strength: mild weakness   Hip joint: normal pain-free ROM   Gait:  normal   Leg length: equal and symmetric  Alignment:  neutral     No obvious peripheral edema noted.   Distal neurovascular exam demonstrates normal perfusion, intact sensation to light touch and full strength.     Examination of the contralateral knee demonstrates:  No significant atrophy, swelling or effusion. Full range of motion. Neurovascularly intact distally    Radiographic Examination/Diagnostics:  Knee XR personally viewed, independently  interpreted and radiology report was reviewed.    XR KNEE (3 VIEWS), LEFT (CPT=73562)    Result Date: 1/5/2024  CONCLUSION:  Moderate medial osteoarthritic changes.   LOCATION:  [facility]   Dictated by (CST): Melvin Hale MD on 1/05/2024 at 10:50 AM     Finalized by (CST): Melvin Hale MD on 1/05/2024 at 10:50 AM       XR KNEE, COMPLETE (4 OR MORE VIEWS), RIGHT (CPT=73564)    Result Date: 1/5/2024  CONCLUSION:  Medial joint space narrowing.  Patellar enthesopathy.   LOCATION:  [facility]   Dictated by (CST): Melvin Hale MD on 1/05/2024 at 10:49 AM     Finalized by (CST): Melvin Hale MD on 1/05/2024 at 10:50 AM          IMPRESSION: Marquita Cooper is a 63 year old female with left knee medial joint space narrowing and osteoarthritis in the setting of effusion and discomfort.  Amenable to conservative treatment with intra-articular corticosteroid injection and physical therapy.    PLAN:   We had a detailed discussion outlining the etiology, anatomy, pathophysiology, and natural history of knee osteoarthritis and pain. Imaging was reviewed in detail and correlated to a 3-dimensional model of the knee.     We reviewed the treatment of this disease condition.  Fortunately, treatment is amenable to conservative treatment which we chose to optimize at today's visit.  After a discussion of a variety of conservative treatment options, I recommended intra-articular injection with corticosteroid and ketorolac coupled with physical therapy to aid in strengthening, range of motion, functional improvement, and return to baseline activity.      We elected to proceed with the injection procedure at today's visit. We discussed the risk and benefits of the procedure; including, but not limited to: infection, injury to blood vessels, nerve injury, prolonged pain, swelling, site soreness, failure to progress, and need for advanced treatments.  The patient voiced understanding and agreed to proceed with the treatment plan.     The patient  had the opportunity to ask questions and all questions were answered appropriately.     I spent 30 minutes in preparation to see the patient, counseling/education of relevant pathology, discussing imaging results, ordering therapy  intervention, care coordination, and documentation into the electronic medical record.      FOLLOW-UP:  Return to clinic on an as needed basis.       Tawnya Hunt MD  Knee, Shoulder, & Elbow Surgery / Sports Medicine Specialist  Orthopaedic Surgery  [address] [place] [address] [id number] [place] [address].[email or web address]  [email or web address]  t: [phone]  o: [phone]  f: [phone]    This note was dictated using Dragon software.  While it was briefly proofread prior to completion, some grammatical, spelling, and word choice errors due to dictation may still occur.

## 2024-01-05 NOTE — TELEPHONE ENCOUNTER
Called patient. No answer at this time.  Message left to call the office back if she has any further questions.     [employer] message sent.

## 2024-01-05 NOTE — PROCEDURES
Right Knee Intra-articular Injection    Name: Marquita Cooper   MRN: [id number]  Date: 1/5/2024     Clinical Indications:   Persistent knee pain refractory to conservative measures.     After informed consent, the injection site was marked, sterilized with topical chlorhexidine antiseptic, and locally anesthetized with skin refrigerant.    The patient was situation in a comfortable position. Using sterile technique:   2 mL of 0.5% Bupivicaine, 2 mL of 1% Lidocaine, and 1 mL of 40 mg/ml Triamcinolone was injected utilizing anterolateral approach with a 22 gauge needle.  A band-aid was applied.  The patient tolerated the procedure well.        Tawnya Hunt MD  Knee, Shoulder, & Elbow Surgery / Sports Medicine Specialist  Orthopaedic Surgery  [address] [place] [address].[email or web address]  [email or web address]  t: [phone]  o: [phone]  f: [phone]

## 2024-01-08 ENCOUNTER — CARDPULM VISIT (OUTPATIENT)
Dept: CARDIAC REHAB | Facility: HOSPITAL | Age: 64
End: 2024-01-08
Attending: INTERNAL MEDICINE
Payer: COMMERCIAL

## 2024-01-08 PROCEDURE — 93798 PHYS/QHP OP CAR RHAB W/ECG: CPT

## 2024-01-09 NOTE — TELEPHONE ENCOUNTER
- patient states she had severe pain immediately after the injection.  It relieved after a few hours and she does not have pain at this time.  Pain has decreased each day since then.     She has gotten relief from the injection and states \"I'm skipping now\"

## 2024-01-10 ENCOUNTER — APPOINTMENT (OUTPATIENT)
Dept: CARDIAC REHAB | Facility: HOSPITAL | Age: 64
End: 2024-01-10
Attending: INTERNAL MEDICINE
Payer: COMMERCIAL

## 2024-01-10 ENCOUNTER — TELEPHONE (OUTPATIENT)
Dept: INTERNAL MEDICINE CLINIC | Facility: CLINIC | Age: 64
End: 2024-01-10

## 2024-01-10 PROCEDURE — 93798 PHYS/QHP OP CAR RHAB W/ECG: CPT

## 2024-01-11 ENCOUNTER — CARDPULM VISIT (OUTPATIENT)
Dept: CARDIAC REHAB | Facility: HOSPITAL | Age: 64
End: 2024-01-11
Attending: INTERNAL MEDICINE
Payer: COMMERCIAL

## 2024-01-11 PROCEDURE — 93798 PHYS/QHP OP CAR RHAB W/ECG: CPT

## 2024-01-12 ENCOUNTER — APPOINTMENT (OUTPATIENT)
Dept: CARDIAC REHAB | Facility: HOSPITAL | Age: 64
End: 2024-01-12
Attending: INTERNAL MEDICINE
Payer: COMMERCIAL

## 2024-01-15 ENCOUNTER — APPOINTMENT (OUTPATIENT)
Dept: CARDIAC REHAB | Facility: HOSPITAL | Age: 64
End: 2024-01-15
Payer: COMMERCIAL

## 2024-01-15 ENCOUNTER — APPOINTMENT (OUTPATIENT)
Dept: CARDIAC REHAB | Facility: HOSPITAL | Age: 64
End: 2024-01-15
Attending: INTERNAL MEDICINE
Payer: COMMERCIAL

## 2024-01-16 ENCOUNTER — TELEPHONE (OUTPATIENT)
Dept: INTERNAL MEDICINE CLINIC | Facility: CLINIC | Age: 64
End: 2024-01-16

## 2024-01-16 NOTE — TELEPHONE ENCOUNTER
MCM sent to patient asking for a list of vaccines recommended by travel agency.  Explained list can then be shared with TO for review/advisement.  Await patient response.

## 2024-01-16 NOTE — TELEPHONE ENCOUNTER
Patient will be traveling to [place] in 2025  Asking if appropriate for her to start receiving vaccinations in preperation for travel.  She will be receiving through travel agency she is using.    Any specific vaccines she should not receive?     OK to send response via [employer]

## 2024-01-17 ENCOUNTER — APPOINTMENT (OUTPATIENT)
Dept: CARDIAC REHAB | Facility: HOSPITAL | Age: 64
End: 2024-01-17
Attending: INTERNAL MEDICINE
Payer: COMMERCIAL

## 2024-01-17 PROCEDURE — 93798 PHYS/QHP OP CAR RHAB W/ECG: CPT

## 2024-01-18 ENCOUNTER — CARDPULM VISIT (OUTPATIENT)
Dept: CARDIAC REHAB | Facility: HOSPITAL | Age: 64
End: 2024-01-18
Attending: INTERNAL MEDICINE
Payer: COMMERCIAL

## 2024-01-18 PROCEDURE — 93798 PHYS/QHP OP CAR RHAB W/ECG: CPT

## 2024-01-19 ENCOUNTER — APPOINTMENT (OUTPATIENT)
Dept: CARDIAC REHAB | Facility: HOSPITAL | Age: 64
End: 2024-01-19
Attending: INTERNAL MEDICINE
Payer: COMMERCIAL

## 2024-01-22 ENCOUNTER — APPOINTMENT (OUTPATIENT)
Dept: CARDIAC REHAB | Facility: HOSPITAL | Age: 64
End: 2024-01-22
Attending: INTERNAL MEDICINE
Payer: COMMERCIAL

## 2024-01-22 ENCOUNTER — APPOINTMENT (OUTPATIENT)
Dept: CARDIAC REHAB | Facility: HOSPITAL | Age: 64
End: 2024-01-22
Payer: COMMERCIAL

## 2024-01-24 ENCOUNTER — APPOINTMENT (OUTPATIENT)
Dept: CARDIAC REHAB | Facility: HOSPITAL | Age: 64
End: 2024-01-24
Attending: INTERNAL MEDICINE
Payer: COMMERCIAL

## 2024-01-24 PROCEDURE — 93798 PHYS/QHP OP CAR RHAB W/ECG: CPT

## 2024-01-25 ENCOUNTER — CARDPULM VISIT (OUTPATIENT)
Dept: CARDIAC REHAB | Facility: HOSPITAL | Age: 64
End: 2024-01-25
Attending: INTERNAL MEDICINE
Payer: COMMERCIAL

## 2024-01-25 PROCEDURE — 93798 PHYS/QHP OP CAR RHAB W/ECG: CPT

## 2024-01-26 ENCOUNTER — APPOINTMENT (OUTPATIENT)
Dept: CARDIAC REHAB | Facility: HOSPITAL | Age: 64
End: 2024-01-26
Attending: INTERNAL MEDICINE
Payer: COMMERCIAL

## 2024-01-29 ENCOUNTER — CARDPULM VISIT (OUTPATIENT)
Dept: CARDIAC REHAB | Facility: HOSPITAL | Age: 64
End: 2024-01-29
Attending: INTERNAL MEDICINE
Payer: COMMERCIAL

## 2024-01-29 PROCEDURE — 93798 PHYS/QHP OP CAR RHAB W/ECG: CPT

## 2024-01-31 ENCOUNTER — CARDPULM VISIT (OUTPATIENT)
Dept: CARDIAC REHAB | Facility: HOSPITAL | Age: 64
End: 2024-01-31
Attending: INTERNAL MEDICINE
Payer: COMMERCIAL

## 2024-01-31 ENCOUNTER — OFFICE VISIT (OUTPATIENT)
Dept: INTERNAL MEDICINE CLINIC | Facility: CLINIC | Age: 64
End: 2024-01-31
Payer: COMMERCIAL

## 2024-01-31 VITALS
OXYGEN SATURATION: 98 % | HEIGHT: 67 IN | HEART RATE: 78 BPM | WEIGHT: 259.63 LBS | BODY MASS INDEX: 40.75 KG/M2 | DIASTOLIC BLOOD PRESSURE: 80 MMHG | SYSTOLIC BLOOD PRESSURE: 118 MMHG | TEMPERATURE: 98 F

## 2024-01-31 DIAGNOSIS — E11.9 TYPE 2 DIABETES MELLITUS WITHOUT COMPLICATION, WITHOUT LONG-TERM CURRENT USE OF INSULIN (HCC): ICD-10-CM

## 2024-01-31 DIAGNOSIS — E55.9 VITAMIN D DEFICIENCY: ICD-10-CM

## 2024-01-31 DIAGNOSIS — L98.9 SKIN LESION: ICD-10-CM

## 2024-01-31 DIAGNOSIS — F32.A DEPRESSION, UNSPECIFIED DEPRESSION TYPE: ICD-10-CM

## 2024-01-31 DIAGNOSIS — E66.9 OBESITY (BMI 30-39.9): ICD-10-CM

## 2024-01-31 DIAGNOSIS — I26.94 MULTIPLE SUBSEGMENTAL PULMONARY EMBOLI WITHOUT ACUTE COR PULMONALE (HCC): Primary | ICD-10-CM

## 2024-01-31 DIAGNOSIS — E78.00 PURE HYPERCHOLESTEROLEMIA: ICD-10-CM

## 2024-01-31 DIAGNOSIS — I25.810 CORONARY ARTERY DISEASE INVOLVING OTHER CORONARY ARTERY BYPASS GRAFT, UNSPECIFIED WHETHER ANGINA PRESENT: ICD-10-CM

## 2024-01-31 DIAGNOSIS — L91.0 KELOID: ICD-10-CM

## 2024-01-31 DIAGNOSIS — R10.13 EPIGASTRIC PAIN: ICD-10-CM

## 2024-01-31 PROCEDURE — 3079F DIAST BP 80-89 MM HG: CPT | Performed by: FAMILY MEDICINE

## 2024-01-31 PROCEDURE — 99215 OFFICE O/P EST HI 40 MIN: CPT | Performed by: FAMILY MEDICINE

## 2024-01-31 PROCEDURE — 3008F BODY MASS INDEX DOCD: CPT | Performed by: FAMILY MEDICINE

## 2024-01-31 PROCEDURE — 93798 PHYS/QHP OP CAR RHAB W/ECG: CPT

## 2024-01-31 PROCEDURE — 3074F SYST BP LT 130 MM HG: CPT | Performed by: FAMILY MEDICINE

## 2024-01-31 RX ORDER — AMOXICILLIN 500 MG/1
500 TABLET, FILM COATED ORAL EVERY 8 HOURS
COMMUNITY
Start: 2024-01-22

## 2024-01-31 RX ORDER — TIRZEPATIDE 2.5 MG/.5ML
2.5 INJECTION, SOLUTION SUBCUTANEOUS WEEKLY
Qty: 2 ML | Refills: 0 | Status: SHIPPED | OUTPATIENT
Start: 2024-01-31

## 2024-01-31 RX ORDER — ESCITALOPRAM OXALATE 10 MG/1
10 TABLET ORAL DAILY
Qty: 30 TABLET | Refills: 0 | Status: SHIPPED | OUTPATIENT
Start: 2024-01-31

## 2024-01-31 NOTE — PROGRESS NOTES
Marquita Cooper is a 63 year old female.  HPI:   Pt is here for medication check and follow up   Overall is doing well but has had some issues w her emotions , crying at times    under stress w her health    feels needs to talk to someone      was on meds long time ago for depression     wellbutrin she thinks , also for smoking     felt strange w it     Breathing is good    no CP   admits to stress eating but is getting better she feels     Getting PT for the R knee   On the blood thinners    no bleeding w it     Has keloid that is sore to touch   also left leg mole that is changing      Current Outpatient Medications   Medication Sig Dispense Refill    amoxicillin 500 MG Oral Tab Take 1 tablet (500 mg total) by mouth every 8 (eight) hours.      acetaminophen-codeine 300-30 MG Oral Tab Take 1-2 tablets by mouth every 4 to 6 hours as needed for Pain.      Acetaminophen ER (TYLENOL 8 HOUR) 650 MG Oral Tab CR Tylenol 8 Hour 650 mg tablet,extended release, [RxNorm: 7600948]      pantoprazole (PROTONIX) 40 MG Oral Tab EC Take 1 tablet (40 mg total) by mouth.      furosemide 20 MG Oral Tab Take 1 tablet (20 mg total) by mouth nightly.      Irbesartan 300 MG Oral Tab Take 1 tablet (300 mg total) by mouth daily. 90 tablet 0    furosemide 40 MG Oral Tab Take 1 tablet (40 mg total) by mouth daily. PHYSICAL DUE NOW 90 tablet 0    clopidogrel 75 MG Oral Tab       methocarbamol 500 MG Oral Tab Take 1 tablet (500 mg total) by mouth 3 (three) times daily as needed. 30 tablet 1    dicyclomine 20 MG Oral Tab Take 1 tablet (20 mg total) by mouth every 4 (four) hours as needed. 30 tablet 0    rivaroxaban 20 MG Oral Tab Take 1 tablet (20 mg total) by mouth nightly. 70 tablet 0    Blood Glucose Monitoring Suppl (ONETOUCH VERIO FLEX SYSTEM) w/Device Does not apply Kit As directed 1 kit 0    Glucose Blood (ONETOUCH VERIO) In Vitro Strip Test fasting and before dinner 50 strip 6    OneTouch Delica Lancets Fine Does not apply Misc Test twice a  day 50 each 6    rosuvastatin 20 MG Oral Tab Take 1 tablet (20 mg total) by mouth nightly. 30 tablet 3    metoprolol succinate  MG Oral Tablet 24 Hr Take 1 tablet (100 mg total) by mouth daily. (Patient taking differently: Take 1 tablet (100 mg total) by mouth every morning.) 90 tablet 0    aspirin 81 MG Oral Tab EC Take 1 tablet (81 mg total) by mouth daily. 30 tablet 2    Multiple Vitamins-Minerals (ONE-A-DAY WOMENS 50+ ADVANTAGE) Oral Tab Take 1 tablet by mouth daily.      Multiple Vitamins-Minerals (HAIR/SKIN/NAILS) Oral Tab Take 1 tablet by mouth daily.      ibuprofen 600 MG Oral Tab Take 1 tablet (600 mg total) by mouth every 6 (six) hours as needed.      XARELTO STARTER PACK 15 & 20 MG Oral Tablet Therapy Pack Take 1 tablet by mouth As Directed. FOLLOW DIRECTIONS ON PACKAGING (Patient not taking: Reported on 1/31/2024)      HYDROcodone-acetaminophen 5-325 MG Oral Tab Take 1-2 tablets by mouth every 6 (six) hours as needed. (Patient not taking: Reported on 1/31/2024) 30 tablet 0      Past Medical History:   Diagnosis Date    CAD (coronary artery disease)     Coronary atherosclerosis     Fatigue     High blood pressure     High cholesterol     Myocardial infarction (HCC) 05/01/2012    Other and unspecified hyperlipidemia     Peripheral vascular disease (HCC)     Sleep apnea     Stented coronary artery     Unspecified essential hypertension     Wears glasses     Weight gain       Social History:  Social History     Socioeconomic History    Marital status:    Tobacco Use    Smoking status: Former     Packs/day: 0.20     Years: 15.00     Additional pack years: 0.00     Total pack years: 3.00     Types: Cigarettes    Smokeless tobacco: Never    Tobacco comments:     Smokes 4 cigarettes daily.   Vaping Use    Vaping Use: Never used   Substance and Sexual Activity    Alcohol use: Yes     Alcohol/week: 6.0 standard drinks of alcohol     Types: 3 Glasses of wine, 3 Shots of liquor per week     Comment: occ     Drug use: Yes     Types: Cannabis     Comment: Edibles   Other Topics Concern    Caffeine Concern Yes     Comment: 1 cup of coffee daily.     Exercise No     Social Determinants of Health     Financial Resource Strain: Low Risk  (10/18/2023)    Financial Resource Strain     Difficulty of Paying Living Expenses: Not very hard     Med Affordability: No   Food Insecurity: No Food Insecurity (11/3/2023)    Food Insecurity     Food Insecurity: Never true   Transportation Needs: No Transportation Needs (11/3/2023)    Transportation Needs     Lack of Transportation: No   Housing Stability: Low Risk  (11/3/2023)    Housing Stability     Housing Instability: No        REVIEW OF SYSTEMS:   GENERAL HEALTH: feels well otherwise       EXAM:   /80   Pulse 78   Temp 97.9 °F (36.6 °C) (Temporal)   Ht 5' 7\" (1.702 m)   Wt 259 lb 9.6 oz (117.8 kg)   LMP 01/09/2015   SpO2 98%   BMI 40.66 kg/m²   GENERAL: well developed, well nourished,in no apparent distress  SKIN: no rashes     EXTREMITIES: Bilateral barefoot skin diabetic exam is normal, visualized feet and the appearance is normal.  Bilateral monofilament/sensation of both feet is normal.  Pulsation pedal pulse exam of both lower legs/feet is normal as well.        ASSESSMENT AND PLAN:   1. Multiple subsegmental pulmonary emboli without acute cor pulmonale (HCC)  Appears well   CPM w meds       2. Epigastric pain  Resolved          3. Coronary artery disease involving other coronary artery bypass graft, unspecified whether angina present  No acute issues         4. Type 2 diabetes mellitus without complication, without long-term current use of insulin (HCC)  Good A1c     discussed meds that may help the sugar as well as weight   Mounjaro ordered    jillian 1 mo    - Microalb/Creat Ratio, Random Urine; Future  - Tirzepatide (MOUNJARO) 2.5 MG/0.5ML Subcutaneous Solution Pen-injector; Inject 2.5 mg into the skin once a week.  Dispense: 2 mL; Refill: 0    5. Obesity (BMI  30-39.9)  Mounjaro ordered    jillian 1 mo      - Tirzepatide (MOUNJARO) 2.5 MG/0.5ML Subcutaneous Solution Pen-injector; Inject 2.5 mg into the skin once a week.  Dispense: 2 mL; Refill: 0    6. Vitamin D deficiency  Discussed recent labs   in range       7. Pure hypercholesterolemia  On crestor   discussed labs      8. Depression, unspecified depression type  D/w pt her s/s   BHI ordered     will restart lexapro 10     jillian 1 mo      - [facility] Referral - In Network    9. Keloid  To Dr Beth       - Derm Referral - In Network    10. Skin lesion  To Dr Beth     - Derm Referral - In Network     The patient indicates understanding of these issues and agrees to the plan.  .

## 2024-02-01 ENCOUNTER — CARDPULM VISIT (OUTPATIENT)
Dept: CARDIAC REHAB | Facility: HOSPITAL | Age: 64
End: 2024-02-01
Attending: INTERNAL MEDICINE
Payer: COMMERCIAL

## 2024-02-01 PROCEDURE — 93798 PHYS/QHP OP CAR RHAB W/ECG: CPT

## 2024-02-02 ENCOUNTER — APPOINTMENT (OUTPATIENT)
Dept: CARDIAC REHAB | Facility: HOSPITAL | Age: 64
End: 2024-02-02
Attending: INTERNAL MEDICINE
Payer: COMMERCIAL

## 2024-02-02 ENCOUNTER — TELEPHONE (OUTPATIENT)
Dept: INTERNAL MEDICINE CLINIC | Facility: CLINIC | Age: 64
End: 2024-02-02

## 2024-02-02 NOTE — TELEPHONE ENCOUNTER
Patient called insurance company, was provided with following:    Case #[id number]  [phone]

## 2024-02-02 NOTE — TELEPHONE ENCOUNTER
Called [facility] PA department     Spoke with Gifty in PA department she states that we need to reach out to [facility] due to them being the ones that handle the appeals for medications    Was given phone number [phone] and Fax # [phone]     Called [facility]--Spoke to Karlie and she states that she needs to reach out to provider services to see if we can get Appeal paperwork sent to our office     Spoke with Patience LINDA fax number was given to submit appeal [phone]; [address] [address] [facility] [address]    Needs claim/Case authorization number   Pt name and ID   Medical service dates and notes stating why patient is in need of medication       Faxed patients LOV notes as well as copy of insurance card

## 2024-02-05 ENCOUNTER — CARDPULM VISIT (OUTPATIENT)
Dept: CARDIAC REHAB | Facility: HOSPITAL | Age: 64
End: 2024-02-05
Attending: INTERNAL MEDICINE
Payer: COMMERCIAL

## 2024-02-05 PROCEDURE — 93798 PHYS/QHP OP CAR RHAB W/ECG: CPT

## 2024-02-07 ENCOUNTER — APPOINTMENT (OUTPATIENT)
Dept: CARDIAC REHAB | Facility: HOSPITAL | Age: 64
End: 2024-02-07
Attending: INTERNAL MEDICINE
Payer: COMMERCIAL

## 2024-02-07 PROCEDURE — 93798 PHYS/QHP OP CAR RHAB W/ECG: CPT

## 2024-02-08 ENCOUNTER — CARDPULM VISIT (OUTPATIENT)
Dept: CARDIAC REHAB | Facility: HOSPITAL | Age: 64
End: 2024-02-08
Attending: INTERNAL MEDICINE
Payer: COMMERCIAL

## 2024-02-08 PROCEDURE — 93798 PHYS/QHP OP CAR RHAB W/ECG: CPT

## 2024-02-09 ENCOUNTER — APPOINTMENT (OUTPATIENT)
Dept: CARDIAC REHAB | Facility: HOSPITAL | Age: 64
End: 2024-02-09
Attending: INTERNAL MEDICINE
Payer: COMMERCIAL

## 2024-02-12 ENCOUNTER — CARDPULM VISIT (OUTPATIENT)
Dept: CARDIAC REHAB | Facility: HOSPITAL | Age: 64
End: 2024-02-12
Attending: INTERNAL MEDICINE
Payer: COMMERCIAL

## 2024-02-12 PROCEDURE — 93798 PHYS/QHP OP CAR RHAB W/ECG: CPT

## 2024-02-14 ENCOUNTER — APPOINTMENT (OUTPATIENT)
Dept: CARDIAC REHAB | Facility: HOSPITAL | Age: 64
End: 2024-02-14
Attending: INTERNAL MEDICINE
Payer: COMMERCIAL

## 2024-02-14 PROCEDURE — 93798 PHYS/QHP OP CAR RHAB W/ECG: CPT

## 2024-02-15 ENCOUNTER — CARDPULM VISIT (OUTPATIENT)
Dept: CARDIAC REHAB | Facility: HOSPITAL | Age: 64
End: 2024-02-15
Attending: INTERNAL MEDICINE
Payer: COMMERCIAL

## 2024-02-15 PROCEDURE — 93798 PHYS/QHP OP CAR RHAB W/ECG: CPT

## 2024-02-19 ENCOUNTER — CARDPULM VISIT (OUTPATIENT)
Dept: CARDIAC REHAB | Facility: HOSPITAL | Age: 64
End: 2024-02-19
Attending: INTERNAL MEDICINE
Payer: COMMERCIAL

## 2024-02-19 PROCEDURE — 93798 PHYS/QHP OP CAR RHAB W/ECG: CPT

## 2024-02-21 ENCOUNTER — CARDPULM VISIT (OUTPATIENT)
Dept: CARDIAC REHAB | Facility: HOSPITAL | Age: 64
End: 2024-02-21
Attending: INTERNAL MEDICINE
Payer: COMMERCIAL

## 2024-02-21 PROCEDURE — 93798 PHYS/QHP OP CAR RHAB W/ECG: CPT

## 2024-02-22 ENCOUNTER — CARDPULM VISIT (OUTPATIENT)
Dept: CARDIAC REHAB | Facility: HOSPITAL | Age: 64
End: 2024-02-22
Attending: INTERNAL MEDICINE
Payer: COMMERCIAL

## 2024-02-22 PROCEDURE — 93798 PHYS/QHP OP CAR RHAB W/ECG: CPT

## 2024-02-23 ENCOUNTER — OFFICE VISIT (OUTPATIENT)
Dept: ORTHOPEDICS CLINIC | Facility: CLINIC | Age: 64
End: 2024-02-23
Payer: COMMERCIAL

## 2024-02-23 ENCOUNTER — TELEPHONE (OUTPATIENT)
Dept: ORTHOPEDICS CLINIC | Facility: CLINIC | Age: 64
End: 2024-02-23

## 2024-02-23 DIAGNOSIS — S83.206A POSITIVE MCMURRAY TEST OF RIGHT KNEE, INITIAL ENCOUNTER: ICD-10-CM

## 2024-02-23 DIAGNOSIS — M25.461 EFFUSION OF RIGHT KNEE: Primary | ICD-10-CM

## 2024-02-23 PROCEDURE — 99214 OFFICE O/P EST MOD 30 MIN: CPT | Performed by: ORTHOPAEDIC SURGERY

## 2024-02-23 NOTE — TELEPHONE ENCOUNTER
Patient does not want to go to an outside facility for her MRI- please rewrite it for internal. PLEASE call patient when entered

## 2024-02-23 NOTE — PROGRESS NOTES
Orthopaedic Surgery  [address] [place] [id number]  [phone]     Name: Marquita Cooper   MRN: [id number]  Date: 2/23/2024     REASON FOR VISIT: Right Knee Pain    INTERVAL HISTORY:   Marquita Cooper is a very pleasant 63 year old female who returns today for right knee pain ongoing since 2021, due to no known     To Summarize, The evaluation of right knee pain has been ongoing since 2021 with swelling and extreme pain that has  limited her ability bear weight. This made it challenging to walk. She experiences pain during the night time. She is known to our team for undergoing treatment for left knee management.  This was successfully treated with intra-articular corticosteroid injection 2 years prior.    Today, She returns for consistent right knee pain. She received an Intra-articular injection on 1/05/2024 with no significant relief. Overall, she has no change.    PE:   There were no vitals filed for this visit.  Estimated body mass index is 40.66 kg/m² as calculated from the following:    Height as of 1/31/24: 5' 7\" (1.702 m).    Weight as of 1/31/24: 259 lb 9.6 oz.    Physical Exam  Constitutional:       Appearance: Normal appearance.   HENT:      Head: Normocephalic and atraumatic.   Eyes:      Extraocular Movements: Extraocular movements intact.   Neck:      Musculoskeletal: Normal range of motion and neck supple.   Cardiovascular:      Pulses: Normal pulses.   Pulmonary:      Effort: Pulmonary effort is normal. No respiratory distress.   Abdominal:      General: There is no distension.   Skin:     General: Skin is warm.      Capillary Refill: Capillary refill takes less than 2 seconds.      Findings: No bruising.   Neurological:      General: No focal deficit present.      Mental Status: She is alert.   Psychiatric:         Mood and Affect: Mood normal.     Examination of the right knee demonstrates:   Skin is intact, warm and dry.   Atrophy: mild    Effusion: moderate    Joint line tenderness:  medial  Crepitation: none   Harvey: Positive   Patellar mobility: normal without apprehension  J-sign: none    ROM: Extension lacking less than 5 degrees  Flexion 90 degrees  ACL:  Negative Lachman, Negative Pivot Shift   PCL:  Negative Posterior Drawer  Collateral Ligaments: Stable to Varus and Valgus stress at 0 and 30 degrees  Strength: mild weakness   Hip joint: normal pain-free ROM   Gait:  normal   Leg length: equal and symmetric  Alignment:  neutral     No obvious peripheral edema noted.   Distal neurovascular exam demonstrates normal perfusion, intact sensation to light touch and full strength.     Examination of the contralateral knee demonstrates:  No significant atrophy, swelling or effusion. Full range of motion. Neurovascularly intact distally.        Radiographic Examination/Diagnostics:  X-Ray personally viewed, independently interpreted and radiology report was reviewed.  Narrative   PROCEDURE:  XR KNEE ROUTINE (3 VIEWS), LEFT (CPT=73562)     TECHNIQUE:  Three views were obtained including patellar view.     COMPARISON:  None.     INDICATIONS:     PATIENT STATED HISTORY: (As transcribed by Technologist)  Patient is here for ortho evaluation. Left knee for comparison.          FINDINGS:    No fracture or dislocation.  Medial joint space narrowing and marginal osteophytes.  Surgical clips in medial soft tissues of proximal lower leg.      Impression   CONCLUSION:  Moderate medial osteoarthritic changes.    LOCATION:  [facility]     Dictated by (CST): Melvin Hale MD on 1/05/2024 at 10:50 AM      Finalized by (CST): Melvin Hale MD on 1/05/2024 at 10:50 AM       Narrative   PROCEDURE:  XR KNEE, COMPLETE (4 OR MORE VIEWS), RIGHT (CPT=73564)     TECHNIQUE:  AP, lateral, sunrise, and tunnel views were obtained     COMPARISON:  None.     INDICATIONS:  M25.561 Right knee pain, unspecified chronicity     PATIENT STATED HISTORY: (As transcribed by Technologist)  Patient is here for ortho evaluation. Patient  fell 3 years ago and now her right knee has been in extreme pain since October 2023. Patient states she has pain when weight bearing and uses crutches   around the house to alleviate pain and swelling. Patient states the pain even wakes her up at night. Patient has pain behind her kneecap and all around her anterior knee. History of open heart surgery in October.        FINDINGS:    No fracture or dislocation.  Medial joint space narrowing with weight-bearing.  Spurring of quadriceps tendon attachment to patella.  Atherosclerotic calcifications.      Impression   CONCLUSION:  Medial joint space narrowing.  Patellar enthesopathy.     LOCATION:  [facility]     Dictated by (CST): Melvin Hale MD on 1/05/2024 at 10:49 AM      Finalized by (CST): Melvin Hale MD on 1/05/2024 at 10:50 AM         IMPRESSION: Marquita Cooper is a 63 year old female with Effusion of right knee and Positive Harvey sign suggestive of meniscus pathology, refractory to steroid injection and physical therapy.     In light of physical findings, we elected to order an MRI to further charecterize internal derangement.    PLAN:   We had a detailed discussion outlining the etiology, anatomy, pathophysiology, and natural history of patient's findings. Imaging was reviewed in detail and correlated to a 3-dimensional model of the shoulder.      In light of the chronicity of symptoms, loss of normal function, and  failure to progress conservatively we recommend an MRI to evaluate the integrity of the patient's meniscus. The patient will follow up after imaging.   Differential diagnosis includes but not limited to: cartilage injury/loose body, meniscus tear/injury, ACL tear, bone marrow edema, and osteoarthritis.     External records were also reviewed for pertinent historical findings contributing to the patients undiagnosed new problem with uncertain prognosis.     The patient had the opportunity to ask questions, and all questions were answered  appropriately.       All treatment options were discussed. Advised to schedule MRI.     FOLLOW-UP:   Return to clinic after MRI completion.         Tawnya Hunt MD  Knee, Shoulder, & Elbow Surgery / Sports Medicine Specialist  Orthopaedic Surgery  [address] [place] [address] [id number] [place] [address].[facility]  [email or web address]  t: [phone]  o: [phone]  f: [phone]    This note was dictated using Dragon software.  While it was briefly proofread prior to completion, some grammatical, spelling, and word choice errors due to dictation may still occur.

## 2024-02-23 NOTE — TELEPHONE ENCOUNTER
LM for patient to call back due to unidentified VM.  The MRI order placed by Dr Hunt today is internal.

## 2024-02-26 ENCOUNTER — APPOINTMENT (OUTPATIENT)
Dept: CARDIAC REHAB | Facility: HOSPITAL | Age: 64
End: 2024-02-26
Payer: COMMERCIAL

## 2024-02-28 ENCOUNTER — APPOINTMENT (OUTPATIENT)
Dept: CARDIAC REHAB | Facility: HOSPITAL | Age: 64
End: 2024-02-28
Payer: COMMERCIAL

## 2024-02-29 ENCOUNTER — APPOINTMENT (OUTPATIENT)
Dept: CARDIAC REHAB | Facility: HOSPITAL | Age: 64
End: 2024-02-29
Payer: COMMERCIAL

## 2024-03-01 RX ORDER — IRBESARTAN 300 MG/1
300 TABLET ORAL DAILY
Qty: 90 TABLET | Refills: 0 | Status: SHIPPED | OUTPATIENT
Start: 2024-03-01

## 2024-03-01 RX ORDER — FUROSEMIDE 40 MG/1
40 TABLET ORAL DAILY
Qty: 90 TABLET | Refills: 0 | Status: SHIPPED | OUTPATIENT
Start: 2024-03-01

## 2024-03-01 NOTE — TELEPHONE ENCOUNTER
Furosemide 40 mg  Filled 12-3-23  Qty 90  0 refills  Future Appointments   Date Time Provider Department Center   3/6/2024 10:00 AM Susanne Junior Ed.D. [facility]   3/11/2024  2:50 PM Tawnya Hunt MD [facility] [id number]   3/13/2024  7:45 AM Corky Chavez MD [facility]   3/13/2024 10:00 AM Susanne Junior Ed.D. [facility]   3/20/2024 10:00 AM Susanne Junior Ed.D. [facility]   5/15/2024  7:30 AM Adela Miller MD [facility] 8 [facility] 1-31-24 TO

## 2024-03-01 NOTE — TELEPHONE ENCOUNTER
Irbesartan 300 mg  Filled 12-3-23  Qty 90  0 refills  Future Appointments   Date Time Provider Department Center   3/6/2024 10:00 AM Susanne Junior Ed.D. [facility]   3/11/2024  2:50 PM Tawnya Hunt MD [facility] ORTHO Wo [id number]   3/13/2024  7:45 AM Corky Chavez MD [facility] DERM [facility]   3/13/2024 10:00 AM Susanne Junior Ed.D. LC[facility]   3/20/2024 10:00 AM Susanne Junior Ed.D. [facility]   5/15/2024  7:30 AM Adela Miller MD [facility] 8 [facility] [place]

## 2024-03-07 ENCOUNTER — TELEPHONE (OUTPATIENT)
Dept: INTERNAL MEDICINE CLINIC | Facility: CLINIC | Age: 64
End: 2024-03-07

## 2024-03-07 NOTE — TELEPHONE ENCOUNTER
Incoming fax from [facility]     Patients OV notes from 2/28/2024    Placed in TO in basket for review

## 2024-03-11 ENCOUNTER — TELEPHONE (OUTPATIENT)
Dept: ORTHOPEDICS CLINIC | Facility: CLINIC | Age: 64
End: 2024-03-11

## 2024-03-11 ENCOUNTER — OFFICE VISIT (OUTPATIENT)
Dept: ORTHOPEDICS CLINIC | Facility: CLINIC | Age: 64
End: 2024-03-11
Payer: COMMERCIAL

## 2024-03-11 DIAGNOSIS — M94.261 CHONDROMALACIA OF RIGHT KNEE: ICD-10-CM

## 2024-03-11 DIAGNOSIS — M65.9 SYNOVITIS OF KNEE: ICD-10-CM

## 2024-03-11 DIAGNOSIS — S83.231A COMPLEX TEAR OF MEDIAL MENISCUS OF RIGHT KNEE AS CURRENT INJURY, INITIAL ENCOUNTER: Primary | ICD-10-CM

## 2024-03-11 PROCEDURE — 99215 OFFICE O/P EST HI 40 MIN: CPT | Performed by: ORTHOPAEDIC SURGERY

## 2024-03-11 RX ORDER — ESCITALOPRAM OXALATE 10 MG/1
10 TABLET ORAL DAILY
Qty: 30 TABLET | Refills: 0 | Status: SHIPPED | OUTPATIENT
Start: 2024-03-11

## 2024-03-11 RX ORDER — METOPROLOL SUCCINATE 100 MG/1
100 TABLET, EXTENDED RELEASE ORAL DAILY
Qty: 90 TABLET | Refills: 0 | Status: SHIPPED | OUTPATIENT
Start: 2024-03-11

## 2024-03-11 NOTE — PROGRESS NOTES
OR BOOKING SHEET KNEE ARTHROSCOPY  Location: [] [facility]   [x] [facility]  Name: Marquita Cooper  MRN: [id number]   : 10/13/1960  Diagnosis:  [x] Complex tear of medial meniscus of right knee as current injury, initial encounter [S83.016A]  Disposition:    [x] Ambulatory  Operative Time Required: 45 minutes ([facility])  Procedure:  Antibiotics: 2 g cefazolin within 60 minutes of surgical incision (3 g if > 120 kg)  Laterality: [x] RIGHT  [] LEFT                       [] BILATERAL  Procedures:   [x] Knee Arthroscopy     [x] Partial Medial Meniscectomy (42776)   [] Partial Lateral Meniscectomy (74189)                    [] Partial Medial and Lateral Meniscectomy (67361)     [] Lateral Meniscus Repair (71204)                    [] Medial Meniscus Repair (81538)     [x] Synovectomy (65244)   [x] Abrasionplasty (28078)      Injections:   [] Stem cells from bone marrow aspiration (11337)    From:  [] Left Iliac crest  [] Right Iliac crest    To:  [] Left knee  [] Right knee  [] Other     Additional info:   [x] PCP Clearance Needed  [] MRSA  [x] Physical Therapy External [facility]  [] DME Rx  [] Appt with Dr. Hunt needed  Implants needed: None  Positioning Equipment: Supine with Lateral Post

## 2024-03-11 NOTE — TELEPHONE ENCOUNTER
Requesting   Name from pharmacy: METOPROLOL ER SUCCINATE 100MG TABS          Will file in chart as: METOPROLOL SUCCINATE  MG Oral Tablet 24 Hr    Sig: TAKE 1 TABLET(100 MG) BY MOUTH DAILY    Disp: 90 tablet    Refills: 0 (Pharmacy requested: Not specified)    Start: 3/11/2024    Class: Normal    Non-formulary    Last ordered: 6 months ago (9/8/2023) by Adela Miller MD    Last refill: 12/14/2023    Rx #: [id number]    Hypertension Medications Protocol [id number]/11/2024 02:36 PM   Protocol Details CMP or BMP in past 12 months    Last BP reading less than 140/90    In person appointment or virtual visit in the past 12 mos or appointment in next 3 mos    EGFRCR or GFRAA > 50       Name from pharmacy: ESCITALOPRAM 10MG TABLETS         Will file in chart as: ESCITALOPRAM 10 MG Oral Tab    Sig: TAKE 1 TABLET(10 MG) BY MOUTH DAILY    Disp: 30 tablet    Refills: 0 (Pharmacy requested: Not specified)    Start: 3/11/2024    Class: Normal    Non-formulary    Last ordered: 1 month ago (1/31/2024) by Adela Miller MD    Last refill: 2/14/2024    Rx #: [id number]    Psychiatric Non-Scheduled (Anti-Anxiety) [id number]/11/2024 02:36 PM   Protocol Details In person appointment or virtual visit in the past 6 mos or appointment in next 3 mos    Depression Screening completed within the past 12 months        LOV: 1/31/2024  RTC: 1 month   Last Relevant Labs: 1/2/2024  Filled:   Metoprolol-12/14/2023 #90 with 0 refills  Escitalopram-1/31/2024 #30 with 0 refills  Future Appointments   Date Time Provider Department Center   3/11/2024  2:50 PM Tawnya Hunt MD [facility] ORTHO Wo [id number]   3/13/2024  7:45 AM Corky Chavez MD [facility]   3/13/2024 10:00 AM Susanne Junior Ed.D. LC[facility]   3/20/2024 10:00 AM Susanne Junior Ed.D. [facility]   4/3/2024 10:00 AM Susanne Junior Ed.D. [facility]   5/15/2024  7:30 AM Adela Miller MD [facility] 8 [facility]  [place]

## 2024-03-11 NOTE — H&P
Orthopaedic Surgery  [address] [place] [address]  [phone]     PRE SURGICAL - HISTORY AND PHYSICAL EXAMINATION     Name: Marquita Cooper   MRN: [id number]  Date: 3/11/2024     CC: Right Knee Pain and mechanical symptoms    REFERRED BY: Adela Miller MD    HPI:   Marquita Cooper is a very pleasant 63 year old female who presents today for evaluation of Right knee pain and mechanical symptoms and recent completion of MRI demonstrating meniscal pathology.     To summarize:  right knee pain has been ongoing since 2021 intermittently and worsened in October 2023. She has swelling and extreme pain that has limited her ability bear weight. This made it challenging to walk. She experiences pain during the night time.     She received an intra-articular injection on 1/05/2024 with no significant relief. She has completed a dedicated course of 6 weeks of physical therapy as well with continued pain.     Today, her symptoms remain largely unchanged since the previous visit. 7/10 pain.    PMH:   Past Medical History:   Diagnosis Date    CAD (coronary artery disease)     Coronary atherosclerosis     Fatigue     High blood pressure     High cholesterol     Myocardial infarction (HCC) 05/01/2012    Other and unspecified hyperlipidemia     Peripheral vascular disease (HCC)     Sleep apnea     Stented coronary artery     Unspecified essential hypertension     Wears glasses     Weight gain        PAST SURGICAL HX:  Past Surgical History:   Procedure Laterality Date    CARDIAC SURGERY      stent    TUBAL LIGATION         FAMILY HX:  Family History   Problem Relation Age of Onset    Ovarian Cancer Paternal Grandmother     Heart Disease Father     Other (acute MI) Father     Other (CAD) Father     Other (Other) Mother     Other (stroke syndrome) Mother     Other (HTN) Sister     Stroke Neg     Cancer Neg        ALLERGIES:  Hydrochlorothiazide and Sulfa drugs cross reactors    MEDICATIONS:   Current Outpatient Medications    Medication Sig Dispense Refill    FUROSEMIDE 40 MG Oral Tab TAKE 1 TABLET BY MOUTH DAILY 90 tablet 0    IRBESARTAN 300 MG Oral Tab TAKE 1 TABLET(300 MG) BY MOUTH DAILY 90 tablet 0    metFORMIN  MG Oral Tablet 24 Hr Take 1 tablet (500 mg total) by mouth daily. 30 tablet 2    amoxicillin 500 MG Oral Tab Take 1 tablet (500 mg total) by mouth every 8 (eight) hours.      Tirzepatide (MOUNJARO) 2.5 MG/0.5ML Subcutaneous Solution Pen-injector Inject 2.5 mg into the skin once a week. 2 mL 0    escitalopram 10 MG Oral Tab Take 1 tablet (10 mg total) by mouth daily. 30 tablet 0    acetaminophen-codeine 300-30 MG Oral Tab Take 1-2 tablets by mouth every 4 to 6 hours as needed for Pain.      Acetaminophen ER (TYLENOL 8 HOUR) 650 MG Oral Tab CR Tylenol 8 Hour 650 mg tablet,extended release, [RxNorm: 0435211]      pantoprazole (PROTONIX) 40 MG Oral Tab EC Take 1 tablet (40 mg total) by mouth.      furosemide 20 MG Oral Tab Take 1 tablet (20 mg total) by mouth nightly.      ibuprofen 600 MG Oral Tab Take 1 tablet (600 mg total) by mouth every 6 (six) hours as needed.      XARELTO STARTER PACK 15 & 20 MG Oral Tablet Therapy Pack Take 1 tablet by mouth As Directed. FOLLOW DIRECTIONS ON PACKAGING (Patient not taking: Reported on 1/31/2024)      clopidogrel 75 MG Oral Tab       methocarbamol 500 MG Oral Tab Take 1 tablet (500 mg total) by mouth 3 (three) times daily as needed. 30 tablet 1    dicyclomine 20 MG Oral Tab Take 1 tablet (20 mg total) by mouth every 4 (four) hours as needed. 30 tablet 0    Blood Glucose Monitoring Suppl (ONETOUCH VERIO FLEX SYSTEM) w/Device Does not apply Kit As directed 1 kit 0    Glucose Blood (ONETOUCH VERIO) In Vitro Strip Test fasting and before dinner 50 strip 6    OneTouch Delica Lancets Fine Does not apply Misc Test twice a day 50 each 6    rosuvastatin 20 MG Oral Tab Take 1 tablet (20 mg total) by mouth nightly. 30 tablet 3    HYDROcodone-acetaminophen 5-325 MG Oral Tab Take 1-2 tablets by  mouth every 6 (six) hours as needed. (Patient not taking: Reported on 1/31/2024) 30 tablet 0    metoprolol succinate  MG Oral Tablet 24 Hr Take 1 tablet (100 mg total) by mouth daily. (Patient not taking: Reported on 2/23/2024) 90 tablet 0    aspirin 81 MG Oral Tab EC Take 1 tablet (81 mg total) by mouth daily. 30 tablet 2    Multiple Vitamins-Minerals (ONE-A-DAY WOMENS 50+ ADVANTAGE) Oral Tab Take 1 tablet by mouth daily.      Multiple Vitamins-Minerals (HAIR/SKIN/NAILS) Oral Tab Take 1 tablet by mouth daily.         ROS: A comprehensive 14 point review of systems was performed and was negative aside from the aforementioned per history of present illness.    SOCIAL HX:  Social History     Tobacco Use    Smoking status: Former     Packs/day: 0.20     Years: 15.00     Additional pack years: 0.00     Total pack years: 3.00     Types: Cigarettes    Smokeless tobacco: Never    Tobacco comments:     Smokes 4 cigarettes daily.   Substance Use Topics    Alcohol use: Yes     Alcohol/week: 6.0 standard drinks of alcohol     Types: 3 Glasses of wine, 3 Shots of liquor per week     Comment: occ       PE:   There were no vitals filed for this visit.  Estimated body mass index is 40.66 kg/m² as calculated from the following:    Height as of 1/31/24: 5' 7\" (1.702 m).    Weight as of 1/31/24: 259 lb 9.6 oz.    Physical Exam  Constitutional:       Appearance: Normal appearance.   HENT:      Head: Normocephalic and atraumatic.   Eyes:      Extraocular Movements: Extraocular movements intact.   Neck:      Musculoskeletal: Normal range of motion and neck supple.   Cardiovascular:      Pulses: Normal pulses.   Pulmonary:      Effort: Pulmonary effort is normal. No respiratory distress.   Abdominal:      General: There is no distension.   Skin:     General: Skin is warm.      Capillary Refill: Capillary refill takes less than 2 seconds.      Findings: No bruising.   Neurological:      General: No focal deficit present.      Mental  Status: Alert.   Psychiatric:         Mood and Affect: Mood normal.     Examination of the right knee demonstrates:     Skin is intact, warm and dry.   Atrophy: none    Effusion: small    Joint line tenderness: medial  Crepitation: none   Harvey: Positive   Patellar mobility: normal without apprehension  J-sign: none    ROM: Extension lacking less than 5 degrees  Flexion 120 degrees  ACL:  Negative Lachman, Negative Pivot Shift   PCL:  Negative Posterior Drawer  Collateral Ligaments: Stable to Varus and Valgus stress at 0 and 30 degrees  Strength: mild weakness   Hip joint: normal pain-free ROM   Gait:  mildly antalgic   Leg length: equal and symmetric  Alignment:  neutral     No obvious peripheral edema noted.   Distal neurovascular exam demonstrates normal perfusion, intact sensation to light touch and full strength.     Examination of the contralateral knee demonstrates:  No significant atrophy, swelling or effusion. Full range of motion. Neurovascularly intact distally.    Radiographic Examination/Diagnostics:  MRI of the knee personally viewed, independently interpreted and radiology report was reviewed.    MRI KNEE, RIGHT (HIJ=65341)    Result Date: 3/7/2024  Exam: MRI KNEE RT W/O CONTRAST CPT Code(s): 60815 - MRI JNT OF LWR EXTRE W/O DYE EXAM: MRI right knee without contrast 3/7/2024. COMPARISON:  Right knee radiographs 1/5/2024. INDICATION:  Effusion of right knee. Positive Harvey test of right knee. Internal derangement of knee. Evaluate chondral surfaces and ligamentous structures. Pain, swelling, locking, weakness, and limited range of motion. Status post fall. TECHNIQUE:  Multiplanar multisequence MR images of the right knee were acquired on a 3 Karina imaging system without intravenous contrast. FINDINGS:  The anterior and posterior cruciate ligaments, as well as the fibular collateral ligament appear intact. Mild edema within and adjacent to the medial collateral ligament suggests mild sprain.  Moderate joint effusion with mild synovitis. Possible small loose bodies in the lateral gutter and patellofemoral space. The quadriceps , patellar, and biceps femoris tendons appear intact. Suggestion of a quadriceps enthesophyte at the superior pole of the patella. Mild edema within the popliteus and semimembranous tendons suggests tendinosis. Small Baker's cyst. The distal iliotibial band appears intact. No acute fracture. Bandlike fatty atrophy within the semimembranous muscle. Subcutaneous soft tissue edema is more pronounced along the anterior knee. In the medial compartment, there is severe cartilage loss at the mid weightbearing medial femoral condyle and central aspect of the medial tibial plateau. Mild to moderate cartilage loss elsewhere within the medial tibial plateau. Mild cartilage loss at the posterior weightbearing medial condyle. Small marginal osteophytes. Radial tear of the of the posterior horn of the medial meniscus with mild edema at the meniscocapsular junction. The body of the medial meniscus is small and slightly extruded. In the lateral compartment, there is mild cartilage loss. Fluid undermines the lateral meniscus, without discrete meniscal tear identified. Mild fraying of the free edge of the posterior horn. In the patellofemoral compartment, there is mild to moderate irregular cartilage loss at the patellar apex with slight fraying. Mild cartilage loss at the medial patellar facet.   IMPRESSION:   1. Mild medial collateral ligament sprain.   2. Medial meniscal tear. Free edge fraying of the lateral meniscus without discrete tear.   3. Mild popliteus and semimembranous tendinosis. Small Baker's cyst.   4. Mild osteoarthritic changes, with cartilage loss more pronounced in the medial compartment.   5. Moderate joint effusion with mild synovitis and possible loose bodies.   This report was performed utilizing speech recognition software technology. Despite thorough proofreading, speech  recognition errors could escape detection. If a word or phrase is confusing or out of context, please do not hesitate to call for clarification. Interpreting Radiologist: Renetta Morrison M.D. Electronically Signed: 03/07/2024 06:28 PM      IMPRESSION: Marquita Cooper is a 63 year old female with right Medial Meniscus Tear with mechanical symptoms, synovitis and chondromalacia symptomatic since 2021.  They have failed extensive conservative treatment including Physical therapy greater than 6 weeks, intra-articular knee corticosteroid injection, oral anti-inflammatory medications, and activity modification.     Consequently, they are an appropriate candidate for knee arthroscopy, partial meniscectomy, abrasionplasty and extensive debridement/synovectomy.    PLAN:   I had a lengthy discussion with Marquita about the diagnosis and options, both surgical and nonsurgical. I have recommended that we proceed with arthroscopic surgical treatment as we agree that this intervention will likely offer the best opportunity for symptomatic relief and functional recovery. I used the MRI scan and a 3-dimensional knee model to outline her pathology, as well as general surgical principles. We reviewed the risks associated with arthroscopic partial meniscectomy and debridement / synovectomy.  In particular I emphasized that the displaced flap component and degenerative portion of the meniscus would be removed as this is dysvascular.  Simultaneously, I will perform a diagnostic knee arthroscopy of the knee and hope to identify and address any other pathology that may be encountered such as scar tissue, inflammation, cartilage pathology.  In particular we discussed risks that include, a low risk of infection (<1%), potential transient or permanent injury to nerves with superficial numbness, joint stiffness which may require additional physical therapy, persistent pain/lack of symptomatic improvement, need for future operation, wound complications  (rare as incisions are very small), deep vein thrombosis (DVT) and pulmonary embolism (PE).   We discussed the proposed rehabilitation timeline as well as expected postoperative restrictions.  In this regard, I emphasized that there will be no weightbearing or range of motion restrictions post operatively.  Crutches will be provided initially for patient comfort and I will aim to have the patient begin physical therapy on postoperative day 1.  The advantage of this is to begin immediate mobility and range of motion to mitigate pain and encourage reduction of inflammation/swelling.  This will ultimately lead to a quicker postsurgical rehabilitation.  For most patients, this requires a total of 4 to 6 weeks of postsurgical physical therapy to attain full functional status after simple knee arthroscopy.  Marquita voiced a good understanding of treatment options, risks and benefits, postoperative instructions, rehabilitation timeline, and restrictions. She was given the opportunity to ask questions, which were all answered to the best of my ability and to her satisfaction. Marquita will work with my office to arrange a time for surgery and obtain any medical clearance information necessary. My pre-operative information packet, which details the process and answers many FAQ's will be provided. She was encouraged to call the office with any further questions or concerns.  I spent 45 minutes in preparation to see the patient, counseling/education of relevant pathology, discussing imaging results, ordering post surgical physical therapy intervention, surgical counseling, and care coordination.        FOLLOW-UP:  Post-Operative Visit, POD 6 with Rashid Elaine PA-C.         Tawnya Hunt MD  Knee, Shoulder, & Elbow Surgery / Sports Medicine Specialist  Orthopaedic Surgery  [address] [place] [address] [id number] [email or web address] [email or web address]  [email or web address]  t: [phone]  o: [phone]  f: [phone]    This note was  dictated using Dragon software.  While it was briefly proofread prior to completion, some grammatical, spelling, and word choice errors due to dictation may still occur.

## 2024-03-11 NOTE — TELEPHONE ENCOUNTER
Date of Surgery: EOSC 3/14/2024       Post Op Appt:  3/20/2024 1100AM    Case ID: [id number]     Notes: Lets please add for this Thursday, 3/14. Thanks!                      OR BOOKING SHEET KNEE ARTHROSCOPY  Location: [] [facility]                    [x] [facility]  Name: Marquita Cooper  MRN: [id number]   : 10/13/1960  Diagnosis:  [x] Complex tear of medial meniscus of right knee as current injury, initial encounter [S83.231A]  Disposition:    [x] Ambulatory  Operative Time Required: 45 minutes ([facility])  Procedure:  Antibiotics: 2 g cefazolin within 60 minutes of surgical incision (3 g if > 120 kg)  Laterality:                  [x] RIGHT                  [] LEFT                          [] BILATERAL  Procedures:                    [x] Knee Arthroscopy                                                   [x] Partial Medial Meniscectomy (77447)                    [] Partial Lateral Meniscectomy (39784)                    [] Partial Medial and Lateral Meniscectomy (55861)                       [] Lateral Meniscus Repair (96903)                    [] Medial Meniscus Repair (22848)                       [x] Synovectomy (74673)                    [x] Abrasionplasty (80374)        Injections:                    [] Stem cells from bone marrow aspiration (66713)                                      From:                   [] Left Iliac crest                   [] Right Iliac crest                                      To:                   [] Left knee         [] Right knee                          [] Other      Additional info:   [x] PCP Clearance Needed  [] MRSA  [x] Physical Therapy External [facility]  [] DME Rx  [] Appt with Dr. Hunt needed  Implants needed: None  Positioning Equipment: Supine with Lateral Post

## 2024-03-12 ENCOUNTER — OFFICE VISIT (OUTPATIENT)
Dept: INTERNAL MEDICINE CLINIC | Facility: CLINIC | Age: 64
End: 2024-03-12
Payer: COMMERCIAL

## 2024-03-12 VITALS
HEART RATE: 76 BPM | DIASTOLIC BLOOD PRESSURE: 76 MMHG | OXYGEN SATURATION: 96 % | BODY MASS INDEX: 41.18 KG/M2 | TEMPERATURE: 97 F | WEIGHT: 262.38 LBS | SYSTOLIC BLOOD PRESSURE: 120 MMHG | RESPIRATION RATE: 18 BRPM | HEIGHT: 67 IN

## 2024-03-12 DIAGNOSIS — M17.11 PRIMARY OSTEOARTHRITIS OF RIGHT KNEE: ICD-10-CM

## 2024-03-12 DIAGNOSIS — Z01.818 PRE-OP EXAMINATION: Primary | ICD-10-CM

## 2024-03-12 PROCEDURE — 99214 OFFICE O/P EST MOD 30 MIN: CPT | Performed by: FAMILY MEDICINE

## 2024-03-12 NOTE — PROGRESS NOTES
CC: Preop exam.    Marquita Cooper is a 63 year old female who presents for a pre-operative physical exam  By Dr. Hunt's  request. Patient is to have Right knee arthroscopy partial medial meniscectomy synovectomy abrasionplasty,secondary to chronic right knee pain to be on 3/14/24 at [facility] .    No prior anesthesia problem. Patient will be having general anesthesia for her surgery.    Patient complains of right knee pain for at least 3 years. Patient has done conservative treatment- oral medications, PT and injections. Patient has opted for surgery since the pain is worsening.       Current Outpatient Medications   Medication Sig Dispense Refill    metoprolol succinate  MG Oral Tablet 24 Hr Take 1 tablet (100 mg total) by mouth daily. 90 tablet 0    escitalopram 10 MG Oral Tab Take 1 tablet (10 mg total) by mouth daily. 30 tablet 0    IRBESARTAN 300 MG Oral Tab TAKE 1 TABLET(300 MG) BY MOUTH DAILY 90 tablet 0    FUROSEMIDE 40 MG Oral Tab TAKE 1 TABLET BY MOUTH DAILY (Patient taking differently: Take 0.5 tablets (20 mg total) by mouth 2 (two) times daily.) 90 tablet 0    metFORMIN  MG Oral Tablet 24 Hr Take 1 tablet (500 mg total) by mouth daily. 30 tablet 2    Acetaminophen ER (TYLENOL 8 HOUR) 650 MG Oral Tab CR Tylenol 8 Hour 650 mg tablet,extended release, [RxNorm: 7304596]      pantoprazole (PROTONIX) 40 MG Oral Tab EC Take 1 tablet (40 mg total) by mouth.      ibuprofen 600 MG Oral Tab Take 1 tablet (600 mg total) by mouth every 6 (six) hours as needed.      clopidogrel 75 MG Oral Tab       methocarbamol 500 MG Oral Tab Take 1 tablet (500 mg total) by mouth 3 (three) times daily as needed. 30 tablet 1    dicyclomine 20 MG Oral Tab Take 1 tablet (20 mg total) by mouth every 4 (four) hours as needed. 30 tablet 0    rosuvastatin 20 MG Oral Tab Take 1 tablet (20 mg total) by mouth nightly. 30 tablet 3    aspirin 81 MG Oral Tab EC Take 1 tablet (81 mg total) by mouth daily. 30 tablet 2     Multiple Vitamins-Minerals (ONE-A-DAY WOMENS 50+ ADVANTAGE) Oral Tab Take 1 tablet by mouth daily.      Multiple Vitamins-Minerals (HAIR/SKIN/NAILS) Oral Tab Take 1 tablet by mouth daily.      acetaminophen-codeine 300-30 MG Oral Tab Take 1-2 tablets by mouth every 4 to 6 hours as needed for Pain.      Blood Glucose Monitoring Suppl (ONETOUCH VERIO FLEX SYSTEM) w/Device Does not apply Kit As directed (Patient not taking: Reported on 3/12/2024) 1 kit 0    Glucose Blood (ONETOUCH VERIO) In Vitro Strip Test fasting and before dinner (Patient not taking: Reported on 3/12/2024) 50 strip 6    OneTouch Delica Lancets Fine Does not apply Misc Test twice a day (Patient not taking: Reported on 3/12/2024) 50 each 6      Allergies:   Allergies   Allergen Reactions    Hydrochlorothiazide RASH     \"TABS\"    Sulfa Drugs Cross Reactors RASH      Past Medical History:   Diagnosis Date    CAD (coronary artery disease)     Coronary atherosclerosis     Fatigue     High blood pressure     High cholesterol     Myocardial infarction (HCC) 05/01/2012    Other and unspecified hyperlipidemia     Peripheral vascular disease (HCC)     Sleep apnea     Stented coronary artery     Unspecified essential hypertension     Wears glasses     Weight gain       Past Surgical History:   Procedure Laterality Date    CARDIAC SURGERY      stent    TUBAL LIGATION        Family History   Problem Relation Age of Onset    Ovarian Cancer Paternal Grandmother     Heart Disease Father     Other (acute MI) Father     Other (CAD) Father     Other (Other) Mother     Other (stroke syndrome) Mother     Other (HTN) Sister     Stroke Neg     Cancer Neg       Social History:   Social History     Socioeconomic History    Marital status:    Tobacco Use    Smoking status: Former     Packs/day: 0.20     Years: 15.00     Additional pack years: 0.00     Total pack years: 3.00     Types: Cigarettes    Smokeless tobacco: Never    Tobacco comments:     Smokes 4 cigarettes  daily.   Vaping Use    Vaping Use: Never used   Substance and Sexual Activity    Alcohol use: Yes     Alcohol/week: 6.0 standard drinks of alcohol     Types: 3 Glasses of wine, 3 Shots of liquor per week     Comment: occ    Drug use: Yes     Types: Cannabis     Comment: Edibles   Other Topics Concern    Caffeine Concern Yes     Comment: 1 cup of coffee daily.     Exercise No     Social Determinants of Health     Financial Resource Strain: Low Risk  (10/18/2023)    Financial Resource Strain     Difficulty of Paying Living Expenses: Not very hard     Med Affordability: No   Food Insecurity: No Food Insecurity (11/3/2023)    Food Insecurity     Food Insecurity: Never true   Transportation Needs: No Transportation Needs (11/3/2023)    Transportation Needs     Lack of Transportation: No   Housing Stability: Low Risk  (11/3/2023)    Housing Stability     Housing Instability: No      Occ:    Exercise: minimal.  Diet: watches minimally     REVIEW OF SYSTEMS:   GENERAL: feels well otherwise  SKIN: denies any unusual skin lesions  EYES:denies blurred vision or double vision  HEENT: denies nasal congestion, sinus pain or ST  LUNGS: denies shortness of breath with exertion  CARDIOVASCULAR: denies chest pain on exertion,+HTN,PAD,CAD  GI: denies abdominal pain,denies heartburn  : denies dysuria, vaginal discharge or itching,periods regular denies nocturia or changes in stream  MUSCULOSKELETAL: denies back pain  NEURO: denies headaches  PSYCHE: + dysthymia  HEMATOLOGIC: denies hx of anemia  ENDOCRINE: denies thyroid history,+DM  ALL/ASTHMA: denies hx of allergy or asthma    EXAM:   /76 (BP Location: Left arm, Patient Position: Sitting, Cuff Size: large)   Pulse 76   Temp 97.2 °F (36.2 °C) (Temporal)   Resp 18   Ht 5' 7\" (1.702 m)   Wt 262 lb 6.4 oz (119 kg)   LMP 01/09/2015   SpO2 96%   BMI 41.10 kg/m²   GENERAL: well developed, well nourished,in no apparent distress  SKIN: no rashes,no suspicious  lesions  HEENT: atraumatic, normocephalic,ears and throat are clear  EYES:PERRLA, EOMI, normal optic disk,conjunctiva are clear  NECK: supple,no adenopathy,no bruits  CHEST: no chest tenderness  LUNGS: clear to auscultation  CARDIO: RRR without murmur  GI: good BS's,no masses, HSM or tenderness  : deferred  MUSCULOSKELETAL: back is not tender,FROM of the back  EXTREMITIES: no edema  KNEE: right knee: + medial pain. no crepitus, locking or popping. + swelling.   Gait is steady.  NEURO: Oriented times three,cranial nerves are intact,motor and sensory are grossly intact    ASSESSMENT AND PLAN:   Marquita Cooper is a 63 year old female who presents for a pre-operative physical exam. No labs or EKG needed for surgery.    Patient appears stable. Patient is cleared for their procedure and may proceed at an acceptable risk for complications.    Encounter Diagnoses   Name Primary?    Primary osteoarthritis of right knee     Pre-op examination Yes       No orders of the defined types were placed in this encounter.      Meds & Refills for this Visit:  Requested Prescriptions      No prescriptions requested or ordered in this encounter       Imaging & Consults:  None

## 2024-03-13 ENCOUNTER — TELEPHONE (OUTPATIENT)
Dept: INTERNAL MEDICINE CLINIC | Facility: CLINIC | Age: 64
End: 2024-03-13

## 2024-03-13 DIAGNOSIS — S83.231A COMPLEX TEAR OF MEDIAL MENISCUS OF RIGHT KNEE AS CURRENT INJURY, INITIAL ENCOUNTER: Primary | ICD-10-CM

## 2024-03-13 RX ORDER — TRAMADOL HYDROCHLORIDE 50 MG/1
TABLET ORAL
Qty: 20 TABLET | Refills: 1 | Status: SHIPPED | OUTPATIENT
Start: 2024-03-13

## 2024-03-13 RX ORDER — ONDANSETRON 4 MG/1
TABLET, FILM COATED ORAL
Qty: 8 TABLET | Refills: 0 | Status: SHIPPED | OUTPATIENT
Start: 2024-03-13

## 2024-03-20 ENCOUNTER — OFFICE VISIT (OUTPATIENT)
Dept: ORTHOPEDICS CLINIC | Facility: CLINIC | Age: 64
End: 2024-03-20
Payer: COMMERCIAL

## 2024-03-20 DIAGNOSIS — Z98.890 S/P ARTHROSCOPY OF KNEE: Primary | ICD-10-CM

## 2024-03-20 PROCEDURE — 99024 POSTOP FOLLOW-UP VISIT: CPT | Performed by: PHYSICIAN ASSISTANT

## 2024-03-20 NOTE — PROGRESS NOTES
[facility] ORTHOPEDICS  [address] [place] [id number]  [phone]       Name: Marquita Cooper   MRN: [id number]  Date: 3/20/2024     REASON FOR VISIT: First Post-Surgical Visit   Surgery: Right knee scope, partial medial meniscectomy, extensive debridement, abrasionplasty of [facility] on 03/14/2024.     INTERVAL HISTORY:  Marquita Cooper is a 63 year old female who returns after the aforementioned procedure.  The post-operative course has been unremarkable with pain well controlled and overall progress noted.     Physical therapy was started and is progressing well.  The patient denies any calf pain or tenderness, fevers, chills, sweats or signs of active infection. The patient has been compliant with the postoperative protocol, and admits to normal bowel and bladder function. No acute issues.     ROS: ROS    PE:   There were no vitals filed for this visit.  Estimated body mass index is 41.04 kg/m² as calculated from the following:    Height as of 3/12/24: 5' 7\" (1.702 m).    Weight as of 3/12/24: 262 lb (118.8 kg).    Physical Exam  Constitutional:       Appearance: Normal appearance.   HENT:      Head: Normocephalic and atraumatic.   Eyes:      Extraocular Movements: Extraocular movements intact.   Neck:      Musculoskeletal: Normal range of motion and neck supple.   Cardiovascular:      Pulses: Normal pulses.   Pulmonary:      Effort: Pulmonary effort is normal. No respiratory distress.   Abdominal:      General: There is no distension.   Skin:     General: Skin is warm.      Capillary Refill: Capillary refill takes less than 2 seconds.      Findings: No bruising.   Neurological:      General: No focal deficit present.      Mental Status: She is alert.   Psychiatric:         Mood and Affect: Mood normal.     Examination of the right knee demonstrates:     Physical examination the patient is alert and oriented x3, well-developed, well-nourished, no acute distress.     Tegaderm dressings  were removed, and Steri-Strips were maintained and kept in place.    Incisional sites are clean dry intact without signs of active pathology.  Calf is soft and nontender to palpation.    The contralateral knee is without limitation in range of motion or strength, no positive provocative maneuvers.     Radiographic Examination/Diagnostics:    I personally viewed, independently interpreted and radiology report was reviewed.      MRI KNEE, RIGHT (QHO=27033)    Result Date: 3/7/2024  Exam: MRI KNEE RT W/O CONTRAST CPT Code(s): 44936 - MRI JNT OF LWR EXTRE W/O DYE EXAM: MRI right knee without contrast 3/7/2024. COMPARISON:  Right knee radiographs 1/5/2024. INDICATION:  Effusion of right knee. Positive Harvey test of right knee. Internal derangement of knee. Evaluate chondral surfaces and ligamentous structures. Pain, swelling, locking, weakness, and limited range of motion. Status post fall. TECHNIQUE:  Multiplanar multisequence MR images of the right knee were acquired on a 3 Karina imaging system without intravenous contrast. FINDINGS:  The anterior and posterior cruciate ligaments, as well as the fibular collateral ligament appear intact. Mild edema within and adjacent to the medial collateral ligament suggests mild sprain. Moderate joint effusion with mild synovitis. Possible small loose bodies in the lateral gutter and patellofemoral space. The quadriceps , patellar, and biceps femoris tendons appear intact. Suggestion of a quadriceps enthesophyte at the superior pole of the patella. Mild edema within the popliteus and semimembranous tendons suggests tendinosis. Small Baker's cyst. The distal iliotibial band appears intact. No acute fracture. Bandlike fatty atrophy within the semimembranous muscle. Subcutaneous soft tissue edema is more pronounced along the anterior knee. In the medial compartment, there is severe cartilage loss at the mid weightbearing medial femoral condyle and central aspect of the medial tibial  plateau. Mild to moderate cartilage loss elsewhere within the medial tibial plateau. Mild cartilage loss at the posterior weightbearing medial condyle. Small marginal osteophytes. Radial tear of the of the posterior horn of the medial meniscus with mild edema at the meniscocapsular junction. The body of the medial meniscus is small and slightly extruded. In the lateral compartment, there is mild cartilage loss. Fluid undermines the lateral meniscus, without discrete meniscal tear identified. Mild fraying of the free edge of the posterior horn. In the patellofemoral compartment, there is mild to moderate irregular cartilage loss at the patellar apex with slight fraying. Mild cartilage loss at the medial patellar facet. IMPRESSION: 1. Mild medial collateral ligament sprain. 2. Medial meniscal tear. Free edge fraying of the lateral meniscus without discrete tear. 3. Mild popliteus and semimembranous tendinosis. Small Baker's cyst. 4. Mild osteoarthritic changes, with cartilage loss more pronounced in the medial compartment. 5. Moderate joint effusion with mild synovitis and possible loose bodies. This report was performed utilizing speech recognition software technology. Despite thorough proofreading, speech recognition errors could escape detection. If a word or phrase is confusing or out of context, please do not hesitate to call for clarification. Interpreting Radiologist: Renetta Morrison M.D. Electronically Signed: 03/07/2024 06:28 PM      IMPRESSION: Marquita Cooper is a 63 year old female who presents 6 days s/p Right knee scope, partial medial meniscectomy, extensive debridement, abrasionplasty of C on 03/14/2024.     PLAN:   We had a lengthy discussion with the patient regarding the patient's findings consistent with the expected postoperative course. We recommend continuation of physical therapy with rehabilitation efforts focused on strengthening, range of motion, functional ability, and return to baseline activity.  The patient can continue to progress per protocol.    All questions were answered appropriately and the patient was in agreement with the treatment plan.       FOLLOW-UP:  Return to clinic in four weeks. No imaging required at next visit.             Sincer HELADIO Silverio, PA-C Orthopedic Surgery / Sports Medicine Specialist  [facility]  [address] [place] [address] [id number] [place] [address].org  [email or web address]  t: [phone]  o: [phone]  f: [phone]    This note was dictated using Dragon software.  While it was briefly proofread prior to completion, some grammatical, spelling, and word choice errors due to dictation may still occur.

## 2024-04-08 RX ORDER — ESCITALOPRAM OXALATE 10 MG/1
10 TABLET ORAL DAILY
Qty: 30 TABLET | Refills: 0 | Status: SHIPPED | OUTPATIENT
Start: 2024-04-08

## 2024-04-08 NOTE — TELEPHONE ENCOUNTER
Escitalopram 10 mg  Filled 3-11-24  Qty 30  0 refills  Future Appointments   Date Time Provider Department Center   4/10/2024 10:00 AM Susanne Junior Ed.D. [facility]   4/17/2024 10:00 AM Susanne Junior Ed.D. [facility] MARTIW [facility]   4/24/2024  7:30 AM Corky Chavez MD [facility]   4/24/2024 10:00 AM Susanne Junior Ed.D. [facility] ALFREDO[facility]ILCARISANDW [facility]   5/15/2024  7:30 AM Adela Miller MD [facility] 8 [facility] 1-31-24 TO

## 2024-04-11 ENCOUNTER — TELEPHONE (OUTPATIENT)
Dept: ORTHOPEDICS CLINIC | Facility: CLINIC | Age: 64
End: 2024-04-11

## 2024-04-11 NOTE — TELEPHONE ENCOUNTER
Patient is curious as to why she has to follow up after Physical Therapy. Patient states she's doing well with no issues. Per LOV note return to clinic in 4 weeks per Sincer. That appointment is not booked. Patient is curious as to if she can just come in as needed for future appointments.    Please advise

## 2024-04-11 NOTE — TELEPHONE ENCOUNTER
Rashid Elaine PA  [facility] Clinical Pool14 minutes ago (12:57 PM)       That's fine, she can be as needed. Thanks!

## 2024-04-11 NOTE — TELEPHONE ENCOUNTER
Please advise, thank you!      Patient is curious as to why she has to follow up after Physical Therapy. Patient states she's doing well with no issues. Per LOV note return to clinic in 4 weeks per Sincer. That appointment is not booked. Patient is curious as to if she can just come in as needed for future appointments.     Please advise       DOS 3/14/24  Right knee arthroscopy [facility] FOLLOW-UP:  Return to clinic in four weeks. No imaging required at next visit.

## 2024-04-11 NOTE — TELEPHONE ENCOUNTER
Spoke with pt. Advised if she is doing well, she does not have to schedule a follow up, per Sincer. To follow up in office if with any new or worsening symptoms. Pt stated understanding.

## 2024-04-29 NOTE — CM/SW NOTE
Noni Alcantar (:  1933) is a 89 y.o. female,Established patient, here for evaluation of the following chief complaint(s):  Eye Problem (Pt states that she had her glaucoma pre-op with her eye doctor last week and she has had an aching pain in the back of both of her eyes (towards the back of her head) since then and her bladder incontinence has worsened since that exam. )      Assessment & Plan   1. Generalized abdominal pain  -     CBC with Auto Differential; Future  -     Comprehensive Metabolic Panel; Future  -     XR ABDOMEN (KUB) (SINGLE AP VIEW); Future  -     famotidine (PEPCID) 20 MG tablet; Take 1 tablet by mouth 2 times daily, Disp-60 tablet, R-1Normal  2. Constipation by delayed colonic transit  -     XR ABDOMEN (KUB) (SINGLE AP VIEW); Future  3. Dyspnea on exertion  -     CBC with Auto Differential; Future  -     Comprehensive Metabolic Panel; Future  4. Dysuria  -     AMB POC URINALYSIS DIP STICK AUTO W/O MICRO  5. Seasonal allergic rhinitis, unspecified trigger  6. Elevated blood pressure reading in office with diagnosis of hypertension  7. Atherosclerosis of native coronary artery of native heart with angina pectoris (HCC)  8. Weakness      Patient Instructions   Start Famotidine 20 mg twice daily  Consider addition of Miralax to stimulate bowels if stool softeners are not sufficient  Emphasized the importance of staying well hydrated with plenty of water  Suggest trying pediatric dose of Zyrtec or Claritin for some milder relief of seasonal allergic rhinitis  Will work on getting branded Norvasc re-approved which will alleviate some of the symptoms she's experiencing since she has never done well with generic Amlodipine  Monitor blood pressure daily and keep record to bring to next appointment      Return if symptoms worsen or fail to improve.       Subjective   Patient presents today with a multitude of complaints that were most concentrated around the time of her comprehensive eye exam  RIZWAN sent updates to Peggy Gonzales in [address].      Lorrie Hatchet, [facility], [facility]  Discharge [address] [facility].

## 2024-05-13 ENCOUNTER — LAB ENCOUNTER (OUTPATIENT)
Dept: LAB | Age: 64
End: 2024-05-13
Attending: FAMILY MEDICINE

## 2024-05-13 DIAGNOSIS — E11.9 TYPE 2 DIABETES MELLITUS WITHOUT COMPLICATION, WITHOUT LONG-TERM CURRENT USE OF INSULIN (HCC): ICD-10-CM

## 2024-05-13 DIAGNOSIS — E78.2 MIXED HYPERLIPIDEMIA: ICD-10-CM

## 2024-05-13 LAB
CHOLEST SERPL-MCNC: 153 MG/DL (ref ?–200)
CREAT UR-SCNC: 156.9 MG/DL
FASTING PATIENT LIPID ANSWER: YES
HDLC SERPL-MCNC: 54 MG/DL (ref 40–59)
LDLC SERPL CALC-MCNC: 82 MG/DL (ref ?–100)
MICROALBUMIN UR-MCNC: 0.3 MG/DL
MICROALBUMIN/CREAT 24H UR-RTO: 1.9 UG/MG (ref ?–30)
NONHDLC SERPL-MCNC: 99 MG/DL (ref ?–130)
TRIGL SERPL-MCNC: 93 MG/DL (ref 30–149)
VLDLC SERPL CALC-MCNC: 15 MG/DL (ref 0–30)

## 2024-05-13 PROCEDURE — 82570 ASSAY OF URINE CREATININE: CPT

## 2024-05-13 PROCEDURE — 80061 LIPID PANEL: CPT

## 2024-05-13 PROCEDURE — 82043 UR ALBUMIN QUANTITATIVE: CPT

## 2024-05-13 PROCEDURE — 36415 COLL VENOUS BLD VENIPUNCTURE: CPT

## 2024-05-14 RX ORDER — METFORMIN HYDROCHLORIDE 500 MG/1
500 TABLET, EXTENDED RELEASE ORAL DAILY
Qty: 30 TABLET | Refills: 0 | Status: SHIPPED | OUTPATIENT
Start: 2024-05-14 | End: 2024-05-15

## 2024-05-14 NOTE — TELEPHONE ENCOUNTER
Requesting    Name from pharmacy: METFORMIN ER 500MG 24HR TABS         Will file in chart as: METFORMIN  MG Oral Tablet 24 Hr    Sig: TAKE 1 TABLET(500 MG) BY MOUTH DAILY    Disp: 30 tablet    Refills: 2 (Pharmacy requested: Not specified)    Start: 5/14/2024    Class: Normal    Non-formulary    Last ordered: 2 months ago (2/15/2024) by Adela Miller MD    Last refill: 2/15/2024    Rx #: [id number]    Diabetes Medication Protocol [id number]/14/2024 01:03 PM   Protocol Details Last A1C < 7.5 and within past 6 months    In person appointment or virtual visit in the past 6 mos or appointment in next 3 mos    Microalbumin procedure in past 12 months or taking ACE/ARB    EGFRCR or GFRAA > 50    GFR in the past 12 months           LOV: 03/12/2024  RTC: none noted   Last Relevant Labs: 01/02/2024  Filled: 2/15/2024 #30 with 2 refills    Future Appointments   Date Time Provider Department Center   5/15/2024  7:30 AM Adela Miller MD [facility] 8 [facility] [place]   5/16/2024  4:00 PM [facility] RM1 [facility]   7/31/2024  7:45 AM Corky Chavez MD [facility]

## 2024-05-15 ENCOUNTER — OFFICE VISIT (OUTPATIENT)
Dept: INTERNAL MEDICINE CLINIC | Facility: CLINIC | Age: 64
End: 2024-05-15

## 2024-05-15 VITALS
WEIGHT: 263.19 LBS | BODY MASS INDEX: 41.31 KG/M2 | HEIGHT: 67 IN | HEART RATE: 78 BPM | OXYGEN SATURATION: 98 % | SYSTOLIC BLOOD PRESSURE: 128 MMHG | DIASTOLIC BLOOD PRESSURE: 84 MMHG | TEMPERATURE: 98 F

## 2024-05-15 DIAGNOSIS — E11.9 TYPE 2 DIABETES MELLITUS WITHOUT COMPLICATION, WITHOUT LONG-TERM CURRENT USE OF INSULIN (HCC): ICD-10-CM

## 2024-05-15 DIAGNOSIS — Z00.00 LABORATORY EXAM ORDERED AS PART OF ROUTINE GENERAL MEDICAL EXAMINATION: ICD-10-CM

## 2024-05-15 DIAGNOSIS — Z00.00 WELLNESS EXAMINATION: Primary | ICD-10-CM

## 2024-05-15 PROCEDURE — 99396 PREV VISIT EST AGE 40-64: CPT | Performed by: FAMILY MEDICINE

## 2024-05-15 RX ORDER — FUROSEMIDE 40 MG/1
40 TABLET ORAL DAILY
Qty: 90 TABLET | Refills: 0 | Status: SHIPPED | OUTPATIENT
Start: 2024-05-15

## 2024-05-15 RX ORDER — METOPROLOL SUCCINATE 100 MG/1
100 TABLET, EXTENDED RELEASE ORAL DAILY
Qty: 90 TABLET | Refills: 0 | Status: SHIPPED | OUTPATIENT
Start: 2024-05-15

## 2024-05-15 RX ORDER — DICYCLOMINE HCL 20 MG
20 TABLET ORAL EVERY 4 HOURS PRN
Qty: 30 TABLET | Refills: 0 | Status: SHIPPED | OUTPATIENT
Start: 2024-05-15

## 2024-05-15 RX ORDER — IRBESARTAN 300 MG/1
300 TABLET ORAL DAILY
Qty: 90 TABLET | Refills: 0 | Status: SHIPPED | OUTPATIENT
Start: 2024-05-15

## 2024-05-15 RX ORDER — METFORMIN HYDROCHLORIDE 500 MG/1
500 TABLET, EXTENDED RELEASE ORAL DAILY
Qty: 30 TABLET | Refills: 0 | Status: CANCELLED | OUTPATIENT
Start: 2024-05-15

## 2024-05-15 RX ORDER — TIRZEPATIDE 2.5 MG/.5ML
2.5 INJECTION, SOLUTION SUBCUTANEOUS WEEKLY
Qty: 2 ML | Refills: 0 | Status: SHIPPED | OUTPATIENT
Start: 2024-05-15

## 2024-05-15 RX ORDER — METHOCARBAMOL 500 MG/1
500 TABLET, FILM COATED ORAL 3 TIMES DAILY PRN
Qty: 30 TABLET | Refills: 1 | Status: SHIPPED | OUTPATIENT
Start: 2024-05-15

## 2024-05-15 RX ORDER — ESCITALOPRAM OXALATE 10 MG/1
10 TABLET ORAL DAILY
Qty: 90 TABLET | Refills: 0 | Status: SHIPPED | OUTPATIENT
Start: 2024-05-15

## 2024-05-15 RX ORDER — IBUPROFEN 600 MG/1
600 TABLET ORAL EVERY 6 HOURS PRN
Refills: 0 | Status: CANCELLED | OUTPATIENT
Start: 2024-05-15

## 2024-05-15 RX ORDER — ROSUVASTATIN CALCIUM 20 MG/1
20 TABLET, COATED ORAL NIGHTLY
Qty: 90 TABLET | Refills: 0 | Status: SHIPPED | OUTPATIENT
Start: 2024-05-15

## 2024-05-15 NOTE — PROGRESS NOTES
HPI:   Marquita Cooper is a 63 year old female who presents for a complete physical exam.     Wt Readings from Last 6 Encounters:   05/15/24 263 lb 3.2 oz (119.4 kg)   03/12/24 262 lb (118.8 kg)   03/12/24 262 lb 6.4 oz (119 kg)   01/31/24 259 lb 9.6 oz (117.8 kg)   01/05/24 250 lb (113.4 kg)   11/15/23 250 lb 3.2 oz (113.5 kg)     Body mass index is 41.22 kg/m².     Cholesterol, Total (mg/dL)   Date Value   05/13/2024 153   01/02/2024 187   11/02/2023 154     CHOLESTEROL (mg/dL)   Date Value   09/27/2013 200   08/30/2012 154     HDL Cholesterol (mg/dL)   Date Value   05/13/2024 54   01/02/2024 58   11/02/2023 56     HDL CHOL (mg/dL)   Date Value   09/27/2013 63   08/30/2012 62     LDL Cholesterol (mg/dL)   Date Value   05/13/2024 82   01/02/2024 105 (H)   11/02/2023 75     LDL CHOLESTROL (mg/dL)   Date Value   09/27/2013 121   08/30/2012 79     AST   Date Value   01/02/2024 19 U/L   11/02/2023 22 U/L   10/02/2023      Comment:     Test not reported due to hemolysis.  Test reordered by laboratory.       09/27/2013 29 U/L   08/30/2012 28 U/L     ALT (U/L)   Date Value   01/02/2024 36   11/02/2023 36   10/02/2023 37   09/27/2013 42   08/30/2012 42        Current Outpatient Medications   Medication Sig Dispense Refill    metFORMIN  MG Oral Tablet 24 Hr TAKE 1 TABLET(500 MG) BY MOUTH DAILY 30 tablet 0    escitalopram 10 MG Oral Tab Take 1 tablet (10 mg total) by mouth daily. 30 tablet 0    traMADol 50 MG Oral Tab Please take 1 tablet every 6 hours as needed for pain. Max of 8 tablets per day. Collaborating - Dr. Tawnya Hunt. (Patient not taking: Reported on 3/20/2024) 20 tablet 1    Sennosides-Docusate Sodium 8.6-50 MG Oral Cap Take 1 capsule by mouth daily as needed. 30 capsule 1    ondansetron (ZOFRAN) 4 mg tablet Take 1 tablet by mouth every 8 hours as needed for nausea. (Patient not taking: Reported on 3/20/2024) 8 tablet 0    rivaroxaban (XARELTO) 20 MG Oral Tab Take 1 tablet (20 mg total) by mouth.       metoprolol succinate  MG Oral Tablet 24 Hr Take 1 tablet (100 mg total) by mouth daily. 90 tablet 0    IRBESARTAN 300 MG Oral Tab TAKE 1 TABLET(300 MG) BY MOUTH DAILY 90 tablet 0    FUROSEMIDE 40 MG Oral Tab TAKE 1 TABLET BY MOUTH DAILY (Patient taking differently: Take 0.5 tablets (20 mg total) by mouth 2 (two) times daily.) 90 tablet 0    acetaminophen-codeine 300-30 MG Oral Tab Take 1-2 tablets by mouth every 4 to 6 hours as needed for Pain.      pantoprazole (PROTONIX) 40 MG Oral Tab EC Take 1 tablet (40 mg total) by mouth.      ibuprofen 600 MG Oral Tab Take 1 tablet (600 mg total) by mouth every 6 (six) hours as needed.      methocarbamol 500 MG Oral Tab Take 1 tablet (500 mg total) by mouth 3 (three) times daily as needed. 30 tablet 1    dicyclomine 20 MG Oral Tab Take 1 tablet (20 mg total) by mouth every 4 (four) hours as needed. 30 tablet 0    Blood Glucose Monitoring Suppl (ONETOUCH VERIO FLEX SYSTEM) w/Device Does not apply Kit As directed 1 kit 0    Glucose Blood (ONETOUCH VERIO) In Vitro Strip Test fasting and before dinner 50 strip 6    rosuvastatin 20 MG Oral Tab Take 1 tablet (20 mg total) by mouth nightly. 30 tablet 3    aspirin 81 MG Oral Tab EC Take 1 tablet (81 mg total) by mouth daily. 30 tablet 2    Multiple Vitamins-Minerals (ONE-A-DAY WOMENS 50+ ADVANTAGE) Oral Tab Take 1 tablet by mouth daily.      Multiple Vitamins-Minerals (HAIR/SKIN/NAILS) Oral Tab Take 1 tablet by mouth daily.      Acetaminophen ER (TYLENOL 8 HOUR) 650 MG Oral Tab CR Tylenol 8 Hour 650 mg tablet,extended release, [RxNorm: 0100632] (Patient not taking: Reported on 5/15/2024)      clopidogrel 75 MG Oral Tab  (Patient not taking: Reported on 5/15/2024)      OneTouch Delica Lancets Fine Does not apply Misc Test twice a day (Patient not taking: Reported on 3/12/2024) 50 each 6      Past Medical History:    CAD (coronary artery disease)    Coronary atherosclerosis    Fatigue    High blood pressure    High cholesterol     Myocardial infarction (HCC)    Other and unspecified hyperlipidemia    Peripheral vascular disease (HCC)    Sleep apnea    Stented coronary artery    Unspecified essential hypertension    Wears glasses    Weight gain      Past Surgical History:   Procedure Laterality Date    Cardiac surgery      stent    Tubal ligation        Family History   Problem Relation Age of Onset    Diabetes Father     Hypertension Father     Heart Disorder Father     Heart Disease Father     Other (acute MI) Father     Other (CAD) Father     Heart Disorder Mother     Hypertension Mother     Other (Other) Mother     Other (stroke syndrome) Mother     Ovarian Cancer Paternal Grandmother     Heart Disorder Sister     Other (HTN) Sister     Stroke Neg     Cancer Neg       Social History:   Social History     Socioeconomic History    Marital status:    Tobacco Use    Smoking status: Former     Current packs/day: 0.20     Average packs/day: 0.2 packs/day for 15.0 years (3.0 ttl pk-yrs)     Types: Cigarettes    Smokeless tobacco: Never    Tobacco comments:     Smokes 4 cigarettes daily.   Vaping Use    Vaping status: Never Used   Substance and Sexual Activity    Alcohol use: Yes     Alcohol/week: 6.0 standard drinks of alcohol     Types: 3 Glasses of wine, 3 Shots of liquor per week     Comment: occ    Drug use: Yes     Types: Cannabis     Comment: Edibles   Other Topics Concern    Caffeine Concern Yes     Comment: 1 cup of coffee daily.     Exercise No     Social Determinants of Health     Financial Resource Strain: Low Risk  (10/18/2023)    Financial Resource Strain     Difficulty of Paying Living Expenses: Not very hard     Med Affordability: No   Food Insecurity: No Food Insecurity (11/3/2023)    Food Insecurity     Food Insecurity: Never true   Transportation Needs: No Transportation Needs (11/3/2023)    Transportation Needs     Lack of Transportation: No   Housing Stability: Low Risk  (11/3/2023)    Housing Stability     Housing  Instability: No     .        REVIEW OF SYSTEMS:   GENERAL: feels well otherwise  SKIN: denies rash  HEENT: denies nasal congestion, sinus pain or ST  LUNGS: denies shortness of breath  No cough  CARDIOVASCULAR: denies chest pain on exertion  No palpitations   finished cardiac rehab    GI: denies abdominal pain  : denies dysuria   NEURO: denies headaches  PSYCHE: denies depression or anxiety  HEMATOLOGIC: denies hx of anemia  ENDOCRINE: denies thyroid history  ALL/ASTHMA: denies hx of allergy or asthma    EXAM:   /84   Pulse 78   Temp 97.8 °F (36.6 °C) (Temporal)   Ht 5' 7\" (1.702 m)   Wt 263 lb 3.2 oz (119.4 kg)   LMP 01/09/2015   SpO2 98%   BMI 41.22 kg/m²   Body mass index is 41.22 kg/m².   GENERAL: well developed, well nourished,in no apparent distress  SKIN: no rashes   HEENT: atraumatic, normocephalic,ears and throat are clear  NECK: supple,no adenopathy  LUNGS: clear to auscultation  CARDIO: RRR without murmur  GI: good BS's,no masses, HSM or tenderness  EXTREMITIES: Bilateral barefoot skin diabetic exam is normal, visualized feet and the appearance is normal.  Bilateral monofilament/sensation of both feet is normal.  Pulsation pedal pulse exam of both lower legs/feet is normal as well.  NEURO: motor and sensory are grossly intact    ASSESSMENT AND PLAN:   Marquita Cooper is a 63 year old female who presents for a complete physical exam.  Health maintenance,discussed recent labs she had    Eye check reminded w Dr Santacruz   discussed weight again    will try Mounjaro  should benefit from weight loss as well      was not covered last time    to f/u w Dr Griffith for the heart        The patient indicates understanding of these issues and agrees to the plan.  The patient is asked to return for CPX in 1yr.

## 2024-05-16 ENCOUNTER — HOSPITAL ENCOUNTER (OUTPATIENT)
Dept: MAMMOGRAPHY | Age: 64
Discharge: HOME | End: 2024-05-16
Attending: FAMILY MEDICINE

## 2024-05-16 DIAGNOSIS — Z12.31 SCREENING MAMMOGRAM FOR BREAST CANCER: ICD-10-CM

## 2024-05-16 PROCEDURE — 77063 BREAST TOMOSYNTHESIS BI: CPT | Performed by: FAMILY MEDICINE

## 2024-05-16 PROCEDURE — 77067 SCR MAMMO BI INCL CAD: CPT | Performed by: FAMILY MEDICINE

## 2024-05-29 RX ORDER — TIRZEPATIDE 2.5 MG/.5ML
2.5 INJECTION, SOLUTION SUBCUTANEOUS WEEKLY
Qty: 2 ML | Refills: 0 | Status: SHIPPED | OUTPATIENT
Start: 2024-05-29 | End: 2024-05-31 | Stop reason: DRUGHIGH

## 2024-05-29 NOTE — TELEPHONE ENCOUNTER
Tirzepatide (MOUNJARO) 2.5 MG/0.5ML Subcutaneous Solution Pen-injector     [id number] - [place], [place] - [address] [facility] [place] [facility], [phone], [phone]      Pt is requesting for this  medication be filled before 6/1 as she is getting new insurance and will no longer cover it and have to be paid out of pocket.     Pt called back to changed pharmacy and stated that pharmacy only has 4 left pt is asking if its ok to get a 90 day supply but if no she will take the 30 day supply pt would like this expedited as the pharmacy is almost out and medication is on a manufactured back order

## 2024-05-31 ENCOUNTER — OFFICE VISIT (OUTPATIENT)
Dept: INTERNAL MEDICINE CLINIC | Facility: CLINIC | Age: 64
End: 2024-05-31

## 2024-05-31 VITALS
RESPIRATION RATE: 18 BRPM | SYSTOLIC BLOOD PRESSURE: 136 MMHG | BODY MASS INDEX: 40.87 KG/M2 | HEART RATE: 88 BPM | OXYGEN SATURATION: 97 % | HEIGHT: 67 IN | WEIGHT: 260.38 LBS | DIASTOLIC BLOOD PRESSURE: 78 MMHG | TEMPERATURE: 97 F

## 2024-05-31 DIAGNOSIS — M26.609 TMJ (TEMPOROMANDIBULAR JOINT DISORDER): ICD-10-CM

## 2024-05-31 DIAGNOSIS — E66.01 CLASS 2 SEVERE OBESITY DUE TO EXCESS CALORIES WITH SERIOUS COMORBIDITY AND BODY MASS INDEX (BMI) OF 39.0 TO 39.9 IN ADULT (HCC): ICD-10-CM

## 2024-05-31 DIAGNOSIS — E11.9 TYPE 2 DIABETES MELLITUS WITHOUT COMPLICATION, WITHOUT LONG-TERM CURRENT USE OF INSULIN (HCC): Primary | ICD-10-CM

## 2024-05-31 PROCEDURE — 99214 OFFICE O/P EST MOD 30 MIN: CPT | Performed by: FAMILY MEDICINE

## 2024-05-31 RX ORDER — IRBESARTAN 300 MG/1
300 TABLET ORAL DAILY
Qty: 90 TABLET | Refills: 0 | OUTPATIENT
Start: 2024-05-31

## 2024-05-31 RX ORDER — TIRZEPATIDE 5 MG/.5ML
5 INJECTION, SOLUTION SUBCUTANEOUS WEEKLY
Qty: 2 ML | Refills: 0 | Status: SHIPPED | OUTPATIENT
Start: 2024-05-31 | End: 2024-06-22

## 2024-05-31 RX ORDER — FUROSEMIDE 40 MG/1
40 TABLET ORAL DAILY
Qty: 90 TABLET | Refills: 0 | OUTPATIENT
Start: 2024-05-31

## 2024-05-31 RX ORDER — ROSUVASTATIN CALCIUM 20 MG/1
20 TABLET, COATED ORAL NIGHTLY
Qty: 90 TABLET | Refills: 0 | OUTPATIENT
Start: 2024-05-31

## 2024-05-31 NOTE — PROGRESS NOTES
CHIEF COMPLAINT:     Chief Complaint   Patient presents with    Medication Follow-Up       HPI:   Marquita Cooper is a 63 year old female .  Pt here to f/u on taking Mounjaro for her DM/weight loss. Pt is currently on the 2.5 mg dose. Pt has lost 3 lbs. Pt denies any shortness of breath, dizziness,fatigue, confusion,headaches,increase heart rate,mood changes,or low blood sugar.  Pt aware also of potential side effects like indigestion, nausea, vomiting, bowel changes. Pt has been having some constipation but Pt has net been drinking enough water. Pt took some laxatives and that corrected the problem. Discussed using a stool softner daily if it starts to become an issue.  Pt has been exercising-walking some.     The patient has complaints of right jaw pain. Pain started several months ago. Inciting event- none, but her dentist told her she grinds her teeth. Pt has muscle tightness. Pt denies any dental issue. Denies headaches and ear pain due to the jaw pain. Pt has not noticed popping, grinding,catching and clicking of the jaw. Pt having a hard time opening the mouth at times and has to massage the area to get the jaw to relax. Pt denies radiating pain. Pt denies tingling or numbness in the face. Pt denies weakness in the jaw.       Current Outpatient Medications   Medication Sig Dispense Refill    Tirzepatide (MOUNJARO) 5 MG/0.5ML Subcutaneous Solution Pen-injector Inject 5 mg into the skin once a week for 4 doses. 2 mL 0    furosemide 40 MG Oral Tab Take 1 tablet (40 mg total) by mouth daily. 90 tablet 0    rosuvastatin 20 MG Oral Tab Take 1 tablet (20 mg total) by mouth nightly. 90 tablet 0    metoprolol succinate  MG Oral Tablet 24 Hr Take 1 tablet (100 mg total) by mouth daily. 90 tablet 0    methocarbamol 500 MG Oral Tab Take 1 tablet (500 mg total) by mouth 3 (three) times daily as needed. 30 tablet 1    Irbesartan 300 MG Oral Tab Take 1 tablet (300 mg total) by mouth daily. 90 tablet 0    escitalopram 10  MG Oral Tab Take 1 tablet (10 mg total) by mouth daily. 90 tablet 0    ondansetron (ZOFRAN) 4 mg tablet Take 1 tablet by mouth every 8 hours as needed for nausea. (Patient not taking: Reported on 3/20/2024) 8 tablet 0    rivaroxaban (XARELTO) 20 MG Oral Tab Take 1 tablet (20 mg total) by mouth.      acetaminophen-codeine 300-30 MG Oral Tab Take 1-2 tablets by mouth every 4 to 6 hours as needed for Pain.      Acetaminophen ER (TYLENOL 8 HOUR) 650 MG Oral Tab CR       clopidogrel 75 MG Oral Tab       Blood Glucose Monitoring Suppl (ONETOUCH VERIO FLEX SYSTEM) w/Device Does not apply Kit As directed 1 kit 0    Glucose Blood (ONETOUCH VERIO) In Vitro Strip Test fasting and before dinner 50 strip 6    OneTouch Delica Lancets Fine Does not apply Misc Test twice a day 50 each 6    aspirin 81 MG Oral Tab EC Take 1 tablet (81 mg total) by mouth daily. 30 tablet 2    Multiple Vitamins-Minerals (ONE-A-DAY WOMENS 50+ ADVANTAGE) Oral Tab Take 1 tablet by mouth daily.      Multiple Vitamins-Minerals (HAIR/SKIN/NAILS) Oral Tab Take 1 tablet by mouth daily.      dicyclomine 20 MG Oral Tab Take 1 tablet (20 mg total) by mouth every 4 (four) hours as needed. (Patient not taking: Reported on 5/31/2024) 30 tablet 0    Sennosides-Docusate Sodium 8.6-50 MG Oral Cap Take 1 capsule by mouth daily as needed. (Patient not taking: Reported on 5/31/2024) 30 capsule 1    pantoprazole (PROTONIX) 40 MG Oral Tab EC Take 1 tablet (40 mg total) by mouth. (Patient not taking: Reported on 5/31/2024)        Past Medical History:    CAD (coronary artery disease)    Coronary atherosclerosis    Fatigue    High blood pressure    High cholesterol    Myocardial infarction (HCC)    Other and unspecified hyperlipidemia    Peripheral vascular disease (HCC)    Sleep apnea    Stented coronary artery    Unspecified essential hypertension    Wears glasses    Weight gain      Social History:  Social History     Socioeconomic History    Marital status:     Tobacco Use    Smoking status: Former     Current packs/day: 0.20     Average packs/day: 0.2 packs/day for 15.0 years (3.0 ttl pk-yrs)     Types: Cigarettes    Smokeless tobacco: Never    Tobacco comments:     Smokes 4 cigarettes daily.   Vaping Use    Vaping status: Never Used   Substance and Sexual Activity    Alcohol use: Yes     Alcohol/week: 6.0 standard drinks of alcohol     Types: 3 Glasses of wine, 3 Shots of liquor per week     Comment: occ    Drug use: Yes     Types: Cannabis     Comment: Edibles   Other Topics Concern    Caffeine Concern Yes     Comment: 1 cup of coffee daily.     Exercise No     Social Determinants of Health     Financial Resource Strain: Low Risk  (10/18/2023)    Financial Resource Strain     Difficulty of Paying Living Expenses: Not very hard     Med Affordability: No   Food Insecurity: No Food Insecurity (11/3/2023)    Food Insecurity     Food Insecurity: Never true   Transportation Needs: No Transportation Needs (11/3/2023)    Transportation Needs     Lack of Transportation: No   Housing Stability: Low Risk  (11/3/2023)    Housing Stability     Housing Instability: No        REVIEW OF SYSTEMS:   GENERAL: Denies fever, chills,weight change, decreased appetite  SKIN: Denies rashes, skin wounds or ulcers.  EYES: Denies blurred vision or double vision  HENT: Denies congestion,sore throat or ear pain  CHEST: Denies chest pain, or palpitations  LUNGS: Denies shortness of breath, cough, or wheezing  GI: Denies abdominal pain, N/V/C/D.   MUSCULOSKELETAL: see HPI  LYMPH:  Denies lymphadenopathy  NEURO: Denies headaches or lightheadedness      EXAM:   /78 (BP Location: Left arm, Patient Position: Sitting, Cuff Size: large)   Pulse 88   Temp 97.2 °F (36.2 °C) (Temporal)   Resp 18   Ht 5' 7\" (1.702 m)   Wt 260 lb 6.4 oz (118.1 kg)   LMP 01/09/2015   SpO2 97%   BMI 40.78 kg/m²   GENERAL: well developed, well nourished,in no apparent distress  SKIN: no rashes,no suspicious  lesions  HEAD: atraumatic, normocephalic  EENT: clear  JAW/Right: + jaw tightness, minimal pain currently. Mouth deviates to the right when opening and closing the mouth.  NECK: supple, non-tender, no thyromegaly  LUNGS: clear to auscultation bilaterally, no wheezes or rhonchi. Breathing is non labored.  CARDIO: RRR without murmur  EXTREMITIES: no cyanosis, clubbing or edema      Physical Exam    ASSESSMENT AND PLAN:     Encounter Diagnoses   Name Primary?    Type 2 diabetes mellitus without complication, without long-term current use of insulin (formerly [facility]) Yes    Class 2 severe obesity due to excess calories with serious comorbidity and body mass index (BMI) of 39.0 to 39.9 in adult (formerly [facility])     TMJ (temporomandibular joint disorder)        No orders of the defined types were placed in this encounter.      Meds & Refills for this Visit:  Requested Prescriptions     Signed Prescriptions Disp Refills    Tirzepatide (MOUNJARO) 5 MG/0.5ML Subcutaneous Solution Pen-injector 2 mL 0     Sig: Inject 5 mg into the skin once a week for 4 doses.       Imaging & Consults:  None    PLAN:  1. Type 2 diabetes mellitus without complication, without long-term current use of insulin (formerly [facility])  Increase dose to 5 mg for the next 4 weeks. Patient to watch for dizziness, fatigue,headaches and low blood sugar. Please drink plenty of fluids and increase fiber to help avoid constipation. Will plan to continue to increase dose every 4 weeks.    - Tirzepatide (MOUNJARO) 5 MG/0.5ML Subcutaneous Solution Pen-injector; Inject 5 mg into the skin once a week for 4 doses.  Dispense: 2 mL; Refill: 0    2. Class 2 severe obesity due to excess calories with serious comorbidity and body mass index (BMI) of 39.0 to 39.9 in adult (formerly [facility])  Continue to stick to a [id number] calorie diet and exercise.  - Tirzepatide (MOUNJARO) 5 MG/0.5ML Subcutaneous Solution Pen-injector; Inject 5 mg into the skin once a week for 4 doses.  Dispense: 2 mL; Refill: 0    3. TMJ  (temporomandibular joint disorder)  - Rest the jaw, avoid crunchy or hard to chew foods. Soft foods and liquids are easier on the jaw.  - Protect your jaw while yawning, prevent your mouth from opening to much  - Mouth guard - Pt is seeing her dentist next month  - Alternate heat and cold packs for the inflammation  - Tylenol or Motrin for the pain  - Pt can use her Robaxin to help relax the lock jaw. Side effects of the medication discussed with Patient  - Reduced your stress level.  - Watch for trouble breathing, swallowing,or swelling in the face  - Pt to f/u also if increase pain or fever develops.  - If not improving Pt may need to have PT      The patient indicates understanding of these issues and agrees to the plan.  The patient is asked to return in one month for weight and BP check..    I spent 30 minutes preparing to see the patient,reviewing patient's chart,results,history,medical decision making,placing orders,counseling/coordinating care and documenting in the chart.

## 2024-05-31 NOTE — TELEPHONE ENCOUNTER
Requesting    Name from pharmacy: FUROSEMIDE 40MG TABLETS         Will file in chart as: FUROSEMIDE 40 MG Oral Tab    Sig: TAKE 1 TABLET(40 MG) BY MOUTH DAILY    Disp: 90 tablet    Refills: 0 (Pharmacy requested: Not specified)    Start: 5/30/2024    Class: Normal    Non-formulary    Last ordered: 2 weeks ago (5/15/2024) by Adela Miller MD    Last refill: 5/15/2024    Rx #: [id number]    Hypertension Medications Protocol [id number]/30/2024 05:47 AM   Protocol Details CMP or BMP in past 12 months    Last BP reading less than 140/90    In person appointment or virtual visit in the past 12 mos or appointment in next 3 mos    EGFRCR or GFRAA > 50       Name from pharmacy: ROSUVASTATIN 20MG TABLETS         Will file in chart as: ROSUVASTATIN 20 MG Oral Tab    Sig: TAKE 1 TABLET(20 MG) BY MOUTH EVERY NIGHT    Disp: 90 tablet    Refills: 0 (Pharmacy requested: Not specified)    Start: 5/30/2024    Class: Normal    Non-formulary    Last ordered: 2 weeks ago (5/15/2024) by Adela Miller MD    Last refill: 5/15/2024    Rx #: [id number]    Cholesterol Medication Protocol Ekcqtz9205/30/2024 05:47 AM   Protocol Details ALT < 80    ALT resulted within past year    Lipid panel within past 12 months    In person appointment or virtual visit in the past 12 mos or appointment in next 3 mos       Name from pharmacy: IRBESARTAN 300MG TABLETS         Will file in chart as: IRBESARTAN 300 MG Oral Tab    Sig: TAKE 1 TABLET(300 MG) BY MOUTH DAILY    Disp: 90 tablet    Refills: 0 (Pharmacy requested: Not specified)    Start: 5/30/2024    Class: Normal    Non-formulary    Last ordered: 2 weeks ago (5/15/2024) by Adela Miller MD    Last refill: 5/15/2024    Rx #: [id number]    Hypertension Medications Protocol [id number]/30/2024 05:47 AM   Protocol Details CMP or BMP in past 12 months    Last BP reading less than 140/90    In person appointment or virtual visit in the past 12 mos or appointment in next 3 mos    EGFRCR or  GFRAA > 50        LOV: 5/15/2024  RTC: 1 year   Last Relevant Labs: 1/2/2024   90 day sent on 5/15/2024    Future Appointments   Date Time Provider Department Center   5/31/2024  8:30 AM Marge Tuttle APRN [facility] [place]   7/31/2024  7:45 AM Corky Chavez MD [facility]

## 2024-06-26 ENCOUNTER — OFFICE VISIT (OUTPATIENT)
Dept: INTERNAL MEDICINE CLINIC | Facility: CLINIC | Age: 64
End: 2024-06-26

## 2024-06-26 VITALS
HEIGHT: 67 IN | BODY MASS INDEX: 40.97 KG/M2 | TEMPERATURE: 98 F | WEIGHT: 261 LBS | OXYGEN SATURATION: 96 % | DIASTOLIC BLOOD PRESSURE: 80 MMHG | SYSTOLIC BLOOD PRESSURE: 114 MMHG | HEART RATE: 75 BPM

## 2024-06-26 DIAGNOSIS — E66.01 CLASS 2 SEVERE OBESITY DUE TO EXCESS CALORIES WITH SERIOUS COMORBIDITY AND BODY MASS INDEX (BMI) OF 39.0 TO 39.9 IN ADULT (HCC): Primary | ICD-10-CM

## 2024-06-26 DIAGNOSIS — E11.59 TYPE 2 DIABETES MELLITUS WITH OTHER CIRCULATORY COMPLICATION, WITHOUT LONG-TERM CURRENT USE OF INSULIN (HCC): ICD-10-CM

## 2024-06-26 DIAGNOSIS — I26.94 MULTIPLE SUBSEGMENTAL PULMONARY EMBOLI WITHOUT ACUTE COR PULMONALE (HCC): ICD-10-CM

## 2024-06-26 PROBLEM — R79.89 ELEVATED TROPONIN: Status: RESOLVED | Noted: 2023-11-02 | Resolved: 2024-06-26

## 2024-06-26 PROCEDURE — 99214 OFFICE O/P EST MOD 30 MIN: CPT | Performed by: FAMILY MEDICINE

## 2024-06-26 RX ORDER — ROSUVASTATIN CALCIUM 20 MG/1
20 TABLET, COATED ORAL NIGHTLY
Qty: 90 TABLET | Refills: 0 | Status: SHIPPED | OUTPATIENT
Start: 2024-06-26

## 2024-06-26 RX ORDER — METOPROLOL SUCCINATE 100 MG/1
100 TABLET, EXTENDED RELEASE ORAL DAILY
Qty: 90 TABLET | Refills: 0 | Status: SHIPPED | OUTPATIENT
Start: 2024-06-26

## 2024-06-26 RX ORDER — TIRZEPATIDE 5 MG/.5ML
5 INJECTION, SOLUTION SUBCUTANEOUS WEEKLY
Qty: 2 ML | Refills: 0 | Status: SHIPPED | OUTPATIENT
Start: 2024-06-26 | End: 2024-07-18

## 2024-06-26 NOTE — PROGRESS NOTES
Marquita Cooper is a 63 year old female.  HPI:   Here for f/u on the Mounjaro    on 2.5mg   does feel it is helpful      no weight loss though   Does like her fruit    no NVD from it     would like to go to the 5mg   Breathing is good    no CP      Trying to move around more w walking      Asking about the blood thinners     On it since the fall    Had PE     happened after sx      Current Outpatient Medications   Medication Sig Dispense Refill    furosemide 40 MG Oral Tab Take 1 tablet (40 mg total) by mouth daily. 90 tablet 0    rosuvastatin 20 MG Oral Tab Take 1 tablet (20 mg total) by mouth nightly. 90 tablet 0    metoprolol succinate  MG Oral Tablet 24 Hr Take 1 tablet (100 mg total) by mouth daily. 90 tablet 0    methocarbamol 500 MG Oral Tab Take 1 tablet (500 mg total) by mouth 3 (three) times daily as needed. 30 tablet 1    Irbesartan 300 MG Oral Tab Take 1 tablet (300 mg total) by mouth daily. 90 tablet 0    escitalopram 10 MG Oral Tab Take 1 tablet (10 mg total) by mouth daily. 90 tablet 0    ondansetron (ZOFRAN) 4 mg tablet Take 1 tablet by mouth every 8 hours as needed for nausea. (Patient not taking: Reported on 3/20/2024) 8 tablet 0    rivaroxaban (XARELTO) 20 MG Oral Tab Take 1 tablet (20 mg total) by mouth.      Acetaminophen ER (TYLENOL 8 HOUR) 650 MG Oral Tab CR       Blood Glucose Monitoring Suppl (ONETOUCH VERIO FLEX SYSTEM) w/Device Does not apply Kit As directed 1 kit 0    Glucose Blood (ONETOUCH VERIO) In Vitro Strip Test fasting and before dinner 50 strip 6    OneTouch Delica Lancets Fine Does not apply Misc Test twice a day 50 each 6    Multiple Vitamins-Minerals (ONE-A-DAY WOMENS 50+ ADVANTAGE) Oral Tab Take 1 tablet by mouth daily.      Multiple Vitamins-Minerals (HAIR/SKIN/NAILS) Oral Tab Take 1 tablet by mouth daily.      dicyclomine 20 MG Oral Tab Take 1 tablet (20 mg total) by mouth every 4 (four) hours as needed. (Patient not taking: Reported on 5/31/2024) 30 tablet 0     Sennosides-Docusate Sodium 8.6-50 MG Oral Cap Take 1 capsule by mouth daily as needed. (Patient not taking: Reported on 5/31/2024) 30 capsule 1    acetaminophen-codeine 300-30 MG Oral Tab Take 1-2 tablets by mouth every 4 to 6 hours as needed for Pain.      pantoprazole (PROTONIX) 40 MG Oral Tab EC Take 1 tablet (40 mg total) by mouth. (Patient not taking: Reported on 5/31/2024)      clopidogrel 75 MG Oral Tab  (Patient not taking: Reported on 6/26/2024)      aspirin 81 MG Oral Tab EC Take 1 tablet (81 mg total) by mouth daily. (Patient not taking: Reported on 6/26/2024) 30 tablet 2      Past Medical History:    CAD (coronary artery disease)    Coronary atherosclerosis    Fatigue    High blood pressure    High cholesterol    Myocardial infarction (HCC)    Other and unspecified hyperlipidemia    Peripheral vascular disease (HCC)    Sleep apnea    Stented coronary artery    Unspecified essential hypertension    Wears glasses    Weight gain      Social History:  Social History     Socioeconomic History    Marital status:    Tobacco Use    Smoking status: Former     Current packs/day: 0.20     Average packs/day: 0.2 packs/day for 15.0 years (3.0 ttl pk-yrs)     Types: Cigarettes    Smokeless tobacco: Never    Tobacco comments:     Smokes 4 cigarettes daily.   Vaping Use    Vaping status: Never Used   Substance and Sexual Activity    Alcohol use: Yes     Alcohol/week: 6.0 standard drinks of alcohol     Types: 3 Glasses of wine, 3 Shots of liquor per week     Comment: occ    Drug use: Yes     Types: Cannabis     Comment: Edibles   Other Topics Concern    Caffeine Concern Yes     Comment: 1 cup of coffee daily.     Exercise No     Social Determinants of Health     Financial Resource Strain: Low Risk  (10/18/2023)    Financial Resource Strain     Difficulty of Paying Living Expenses: Not very hard     Med Affordability: No   Food Insecurity: No Food Insecurity (11/3/2023)    Food Insecurity     Food Insecurity: Never  true   Transportation Needs: No Transportation Needs (11/3/2023)    Transportation Needs     Lack of Transportation: No   Housing Stability: Low Risk  (11/3/2023)    Housing Stability     Housing Instability: No        REVIEW OF SYSTEMS:   GENERAL HEALTH: feels well otherwise  SKIN: denies rashes  RESPIRATORY: denies shortness of breath  CARDIOVASCULAR: denies chest pain   GI: denies abdominal pain  NEURO: denies headaches    EXAM:   /80   Pulse 75   Temp 97.8 °F (36.6 °C) (Temporal)   Ht 5' 7\" (1.702 m)   Wt 261 lb (118.4 kg)   LMP 01/09/2015   SpO2 96%   BMI 40.88 kg/m²   GENERAL: well developed, well nourished,in no apparent distress  SKIN: no rashes  NECK: supple,no adenopathy  LUNGS: clear to auscultation  CARDIO: RRR without murmur  EXTREMITIES: no edema    ASSESSMENT AND PLAN:   1. Class 2 severe obesity due to excess calories with serious comorbidity and body mass index (BMI) of 39.0 to 39.9 in adult ([facility])  Increase mounajro to 5    jillian 1-2 mo     - Tirzepatide (MOUNJARO) 5 MG/0.5ML Subcutaneous Solution Pen-injector; Inject 5 mg into the skin once a week for 4 doses.  Dispense: 2 mL; Refill: 0    2. Type 2 diabetes mellitus with other circulatory complication, without long-term current use of insulin ([facility])  Follow labs    should get better w weight loss      4. Multiple subsegmental pulmonary emboli without acute cor pulmonale ([facility])  Reviewed the  stay    OK to stop blood thinners after finishes current bottle          The patient indicates understanding of these issues and agrees to the plan.  .

## 2024-07-30 DIAGNOSIS — E11.59 TYPE 2 DIABETES MELLITUS WITH OTHER CIRCULATORY COMPLICATION, WITHOUT LONG-TERM CURRENT USE OF INSULIN (HCC): Primary | ICD-10-CM

## 2024-07-30 RX ORDER — TIRZEPATIDE 5 MG/.5ML
5 INJECTION, SOLUTION SUBCUTANEOUS WEEKLY
Qty: 2 ML | Refills: 0 | Status: SHIPPED | OUTPATIENT
Start: 2024-07-30 | End: 2024-08-21

## 2024-07-30 NOTE — TELEPHONE ENCOUNTER
Requesting Mounjaro, currently on 5mg dose  She has not lost weight on current dose, reports mild constipation, decreased appetite, she is trying to add exercise/movement in daily    5mg pended, should dose increase?

## 2024-08-07 ENCOUNTER — OFFICE VISIT (OUTPATIENT)
Dept: INTERNAL MEDICINE CLINIC | Facility: CLINIC | Age: 64
End: 2024-08-07
Payer: COMMERCIAL

## 2024-08-07 VITALS
OXYGEN SATURATION: 95 % | WEIGHT: 256.19 LBS | TEMPERATURE: 98 F | DIASTOLIC BLOOD PRESSURE: 84 MMHG | HEIGHT: 67 IN | BODY MASS INDEX: 40.21 KG/M2 | SYSTOLIC BLOOD PRESSURE: 122 MMHG | HEART RATE: 80 BPM

## 2024-08-07 DIAGNOSIS — I10 PRIMARY HYPERTENSION: ICD-10-CM

## 2024-08-07 DIAGNOSIS — B35.9 TINEA: ICD-10-CM

## 2024-08-07 DIAGNOSIS — E11.59 TYPE 2 DIABETES MELLITUS WITH OTHER CIRCULATORY COMPLICATION, WITHOUT LONG-TERM CURRENT USE OF INSULIN (HCC): Primary | ICD-10-CM

## 2024-08-07 DIAGNOSIS — E66.01 CLASS 2 SEVERE OBESITY DUE TO EXCESS CALORIES WITH SERIOUS COMORBIDITY AND BODY MASS INDEX (BMI) OF 39.0 TO 39.9 IN ADULT (HCC): ICD-10-CM

## 2024-08-07 LAB — HEMOGLOBIN A1C: 6.3 % (ref 4.3–5.6)

## 2024-08-07 RX ORDER — TIRZEPATIDE 7.5 MG/.5ML
7.5 INJECTION, SOLUTION SUBCUTANEOUS WEEKLY
Qty: 2 ML | Refills: 0 | Status: SHIPPED | OUTPATIENT
Start: 2024-08-07 | End: 2024-08-29

## 2024-08-07 RX ORDER — FUROSEMIDE 20 MG/1
TABLET ORAL
COMMUNITY
Start: 2024-08-06

## 2024-08-07 RX ORDER — KETOCONAZOLE 20 MG/G
CREAM TOPICAL
Qty: 30 G | Refills: 0 | Status: SHIPPED | OUTPATIENT
Start: 2024-08-07

## 2024-08-07 RX ORDER — FUROSEMIDE 40 MG/1
40 TABLET ORAL DAILY
Qty: 90 TABLET | Refills: 0 | Status: SHIPPED | OUTPATIENT
Start: 2024-08-07

## 2024-08-07 NOTE — PROGRESS NOTES
Marquita Cooper is a 63 year old female.  HPI:   Here for f/u on her meds/weight/DM   on Mounjaro 5  feels eating habits are better   less portions   more selective    is more constipated though   on capsule     on otc fiber med   Seem  to be helping    Check white spots on the scar she has on the chest    no itch        Current Outpatient Medications   Medication Sig Dispense Refill    Tirzepatide (MOUNJARO) 5 MG/0.5ML Subcutaneous Solution Pen-injector Inject 5 mg into the skin once a week for 4 doses. 2 mL 0    metoprolol succinate  MG Oral Tablet 24 Hr Take 1 tablet (100 mg total) by mouth daily. 90 tablet 0    rosuvastatin 20 MG Oral Tab Take 1 tablet (20 mg total) by mouth nightly. 90 tablet 0    furosemide 40 MG Oral Tab Take 1 tablet (40 mg total) by mouth daily. 90 tablet 0    methocarbamol 500 MG Oral Tab Take 1 tablet (500 mg total) by mouth 3 (three) times daily as needed. 30 tablet 1    Irbesartan 300 MG Oral Tab Take 1 tablet (300 mg total) by mouth daily. 90 tablet 0    Sennosides-Docusate Sodium 8.6-50 MG Oral Cap Take 1 capsule by mouth daily as needed. 30 capsule 1    ondansetron (ZOFRAN) 4 mg tablet Take 1 tablet by mouth every 8 hours as needed for nausea. (Patient not taking: Reported on 3/20/2024) 8 tablet 0    acetaminophen-codeine 300-30 MG Oral Tab Take 1-2 tablets by mouth every 4 to 6 hours as needed for Pain.      Acetaminophen ER (TYLENOL 8 HOUR) 650 MG Oral Tab CR       clopidogrel 75 MG Oral Tab       Blood Glucose Monitoring Suppl (ONETOUCH VERIO FLEX SYSTEM) w/Device Does not apply Kit As directed 1 kit 0    Glucose Blood (ONETOUCH VERIO) In Vitro Strip Test fasting and before dinner 50 strip 6    OneTouch Delica Lancets Fine Does not apply Misc Test twice a day 50 each 6    aspirin 81 MG Oral Tab EC Take 1 tablet (81 mg total) by mouth daily. 30 tablet 2    Multiple Vitamins-Minerals (ONE-A-DAY WOMENS 50+ ADVANTAGE) Oral Tab Take 1 tablet by mouth daily.      Multiple  Vitamins-Minerals (HAIR/SKIN/NAILS) Oral Tab Take 1 tablet by mouth daily.      furosemide 20 MG Oral Tab  (Patient not taking: Reported on 8/7/2024)      escitalopram 10 MG Oral Tab Take 1 tablet (10 mg total) by mouth daily. (Patient not taking: Reported on 8/7/2024) 90 tablet 0    dicyclomine 20 MG Oral Tab Take 1 tablet (20 mg total) by mouth every 4 (four) hours as needed. (Patient not taking: Reported on 5/31/2024) 30 tablet 0    rivaroxaban (XARELTO) 20 MG Oral Tab Take 1 tablet (20 mg total) by mouth. (Patient not taking: Reported on 8/7/2024)      pantoprazole (PROTONIX) 40 MG Oral Tab EC Take 1 tablet (40 mg total) by mouth. (Patient not taking: Reported on 5/31/2024)        Past Medical History:    CAD (coronary artery disease)    Coronary atherosclerosis    Fatigue    High blood pressure    High cholesterol    Myocardial infarction (HCC)    Other and unspecified hyperlipidemia    Peripheral vascular disease (HCC)    Sleep apnea    Stented coronary artery    Unspecified essential hypertension    Wears glasses    Weight gain      Social History:  Social History     Socioeconomic History    Marital status:    Tobacco Use    Smoking status: Former     Current packs/day: 0.20     Average packs/day: 0.2 packs/day for 15.0 years (3.0 ttl pk-yrs)     Types: Cigarettes    Smokeless tobacco: Never    Tobacco comments:     Smokes 4 cigarettes daily.   Vaping Use    Vaping status: Never Used   Substance and Sexual Activity    Alcohol use: Yes     Alcohol/week: 6.0 standard drinks of alcohol     Types: 3 Glasses of wine, 3 Shots of liquor per week     Comment: occ    Drug use: Yes     Types: Cannabis     Comment: Edibles   Other Topics Concern    Caffeine Concern Yes     Comment: 1 cup of coffee daily.     Exercise No     Social Determinants of Health     Financial Resource Strain: Low Risk  (10/18/2023)    Financial Resource Strain     Difficulty of Paying Living Expenses: Not very hard     Med Affordability:  No   Food Insecurity: No Food Insecurity (11/3/2023)    Food Insecurity     Food Insecurity: Never true   Transportation Needs: No Transportation Needs (11/3/2023)    Transportation Needs     Lack of Transportation: No   Housing Stability: Low Risk  (11/3/2023)    Housing Stability     Housing Instability: No        REVIEW OF SYSTEMS:   GENERAL HEALTH: feels well otherwise       EXAM:   /84 (BP Location: Left arm, Patient Position: Sitting, Cuff Size: large)   Pulse 80   Temp 97.8 °F (36.6 °C) (Temporal)   Ht 5' 7\" (1.702 m)   Wt 256 lb 3.2 oz (116.2 kg)   LMP 01/09/2015   SpO2 95%   BMI 40.13 kg/m²   GENERAL: well developed, well nourished,in no apparent distress    EXTREMITIES: no edema    ASSESSMENT AND PLAN:   1. Type 2 diabetes mellitus with other circulatory complication, without long-term current use of insulin (HCC)  Great A1c   CPM  to see Dr Santacruz for eye check    - POC Hgb A1C    2. Class 2 severe obesity due to excess calories with serious comorbidity and body mass index (BMI) of 39.0 to 39.9 in adult (HCC)  Doing great   increase to Mounjaro 7.5 dose  jillian 2 mo        3. Primary hypertension  Stable       4. Tinea  Nizoral ordered       The patient indicates understanding of these issues and agrees to the plan.  .

## 2024-08-07 NOTE — TELEPHONE ENCOUNTER
Requesting    Name from pharmacy: FUROSEMIDE 40MG TABLETS         Will file in chart as: FUROSEMIDE 40 MG Oral Tab    Sig: TAKE 1 TABLET BY MOUTH DAILY    Disp: 90 tablet    Refills: 0 (Pharmacy requested: Not specified)    Start: 8/6/2024    Class: Normal    Non-formulary    Last ordered: 2 months ago (5/15/2024) by Adela Miller MD    Last refill: 3/1/2024    Rx #: [id number]    Hypertension Medications Protocol [id number]/06/2024 02:47 PM   Protocol Details CMP or BMP in past 12 months    Last BP reading less than 140/90    In person appointment or virtual visit in the past 12 mos or appointment in next 3 mos    EGFRCR or GFRAA > 50        LOV: 8/7/2024  RTC: 2 months   Last Relevant Labs: 1/2/2024  Filled: 5/15/2024 #90 with 0 refills    Future Appointments   Date Time Provider Department Center   10/7/2024  8:00 AM Adela Miller MD [facility] [place]

## 2024-08-28 RX ORDER — FUROSEMIDE 40 MG
40 TABLET ORAL DAILY
Qty: 90 TABLET | Refills: 0 | Status: SHIPPED | OUTPATIENT
Start: 2024-08-28

## 2024-08-28 NOTE — TELEPHONE ENCOUNTER
Requesting    Name from pharmacy: FUROSEMIDE 40MG TABLETS         Will file in chart as: FUROSEMIDE 40 MG Oral Tab    Sig: TAKE 1 TABLET BY MOUTH DAILY    Disp: 90 tablet    Refills: 0 (Pharmacy requested: Not specified)    Start: 8/28/2024    Class: Normal    Non-formulary    Last ordered: 3 weeks ago (8/7/2024) by Adela Miller MD    Last refill: 8/12/2024    Rx #: [id number]    Hypertension Medications Protocol [id number]/28/2024 05:51 AM   Protocol Details CMP or BMP in past 12 months    Last BP reading less than 140/90    In person appointment or virtual visit in the past 12 mos or appointment in next 3 mos    EGFRCR or GFRAA > 50           LOV: 8/7/2024  RTC: 2 months   Last Relevant Labs: 1/2/2024  Filled: 8/7/2024 #90 with 0 refills    Future Appointments   Date Time Provider Department Center   10/7/2024  8:00 AM Adela Miller MD [facility] [place]

## 2024-09-27 RX ORDER — METOPROLOL SUCCINATE 100 MG/1
100 TABLET, EXTENDED RELEASE ORAL DAILY
Qty: 90 TABLET | Refills: 0 | Status: SHIPPED | OUTPATIENT
Start: 2024-09-27

## 2024-09-27 RX ORDER — TIRZEPATIDE 7.5 MG/.5ML
7.5 INJECTION, SOLUTION SUBCUTANEOUS WEEKLY
Qty: 2 ML | Refills: 0 | Status: SHIPPED | OUTPATIENT
Start: 2024-09-27

## 2024-09-27 NOTE — TELEPHONE ENCOUNTER
Requesting    Name from pharmacy: MOUNJARO 7.5MG/0.5ML INJ (4 PENS)         Will file in chart as: MOUNJARO 7.5 MG/0.5ML Subcutaneous Solution Pen-injector    Sig: ADMINISTER 7.5 MG UNDER THE SKIN 1 TIME A WEEK FOR 4 DOSES    Disp: 2 mL    Refills: 0 (Pharmacy requested: Not specified)    Start: 9/26/2024    Class: Normal    Non-formulary    Last ordered: 1 month ago (8/7/2024) by Adela Miller MD    Last refill: 8/29/2024    Rx #: [id number]    Diabetes Medication Protocol [id number]/26/2024 06:31 PM   Protocol Details Last A1C < 7.5 and within past 6 months    In person appointment or virtual visit in the past 6 mos or appointment in next 3 mos    Microalbumin procedure in past 12 months or taking ACE/ARB    EGFRCR or GFRAA > 50    GFR in the past 12 months        LOV: 8/7/2024  RTC: 2 months  Last Relevant Labs: 8/7/2024  Filled: 8/7/2024 #2 mL with 0 refills    Future Appointments   Date Time Provider Department Center   10/7/2024  8:00 AM Adela Miller MD [facility] [place]

## 2024-09-27 NOTE — TELEPHONE ENCOUNTER
Requesting    Name from pharmacy: METOPROLOL ER SUCCINATE 100MG TABS         Will file in chart as: METOPROLOL SUCCINATE  MG Oral Tablet 24 Hr    Sig: TAKE 1 TABLET(100 MG) BY MOUTH DAILY    Disp: 90 tablet    Refills: 0 (Pharmacy requested: Not specified)    Start: 9/27/2024    Class: Normal    Non-formulary    Last ordered: 3 months ago (6/26/2024) by Adela Miller MD    Last refill: 6/26/2024    Rx #: [id number]    Hypertension Medications Protocol [id number]/27/2024 11:53 AM   Protocol Details CMP or BMP in past 12 months    Last BP reading less than 140/90    In person appointment or virtual visit in the past 12 mos or appointment in next 3 mos    EGFRCR or GFRAA > 50           LOV: 8/7/2024  RTC: 2 months   Last Relevant Labs: 8/7/2024  Filled: 6/26/2024 #90 with 0 refills    Future Appointments   Date Time Provider Department Center   10/7/2024  8:00 AM Adela Miller MD [facility]

## 2024-10-07 ENCOUNTER — OFFICE VISIT (OUTPATIENT)
Dept: INTERNAL MEDICINE CLINIC | Facility: CLINIC | Age: 64
End: 2024-10-07
Payer: COMMERCIAL

## 2024-10-07 VITALS
OXYGEN SATURATION: 94 % | SYSTOLIC BLOOD PRESSURE: 110 MMHG | TEMPERATURE: 98 F | BODY MASS INDEX: 40.18 KG/M2 | WEIGHT: 256 LBS | HEART RATE: 79 BPM | DIASTOLIC BLOOD PRESSURE: 78 MMHG | HEIGHT: 67 IN

## 2024-10-07 DIAGNOSIS — E66.812 CLASS 2 SEVERE OBESITY DUE TO EXCESS CALORIES WITH SERIOUS COMORBIDITY AND BODY MASS INDEX (BMI) OF 39.0 TO 39.9 IN ADULT (HCC): ICD-10-CM

## 2024-10-07 DIAGNOSIS — E66.01 CLASS 2 SEVERE OBESITY DUE TO EXCESS CALORIES WITH SERIOUS COMORBIDITY AND BODY MASS INDEX (BMI) OF 39.0 TO 39.9 IN ADULT (HCC): ICD-10-CM

## 2024-10-07 DIAGNOSIS — I10 PRIMARY HYPERTENSION: ICD-10-CM

## 2024-10-07 DIAGNOSIS — E11.59 TYPE 2 DIABETES MELLITUS WITH OTHER CIRCULATORY COMPLICATION, WITHOUT LONG-TERM CURRENT USE OF INSULIN (HCC): Primary | ICD-10-CM

## 2024-10-07 PROCEDURE — 99213 OFFICE O/P EST LOW 20 MIN: CPT | Performed by: FAMILY MEDICINE

## 2024-10-07 RX ORDER — TIRZEPATIDE 10 MG/.5ML
10 INJECTION, SOLUTION SUBCUTANEOUS WEEKLY
Qty: 2 ML | Refills: 1 | Status: SHIPPED | OUTPATIENT
Start: 2024-10-07 | End: 2024-10-29

## 2024-10-07 NOTE — PROGRESS NOTES
Marquita Cooper is a 63 year old female.  HPI:   Here to check weight     on Mounjaro 7.5   can tell it is helping though   is riding a bike   breathing is good   no CP     does admit to a lot of juicing , grapes          Current Outpatient Medications   Medication Sig Dispense Refill    MOUNJARO 7.5 MG/0.5ML Subcutaneous Solution Pen-injector ADMINISTER 7.5 MG UNDER THE SKIN 1 TIME A WEEK FOR 4 DOSES 2 mL 0    METOPROLOL SUCCINATE  MG Oral Tablet 24 Hr TAKE 1 TABLET(100 MG) BY MOUTH DAILY 90 tablet 0    FUROSEMIDE 40 MG Oral Tab TAKE 1 TABLET BY MOUTH DAILY 90 tablet 0    rosuvastatin 20 MG Oral Tab Take 1 tablet (20 mg total) by mouth nightly. 90 tablet 0    methocarbamol 500 MG Oral Tab Take 1 tablet (500 mg total) by mouth 3 (three) times daily as needed. 30 tablet 1    Irbesartan 300 MG Oral Tab Take 1 tablet (300 mg total) by mouth daily. 90 tablet 0    Sennosides-Docusate Sodium 8.6-50 MG Oral Cap Take 1 capsule by mouth daily as needed. 30 capsule 1    acetaminophen-codeine 300-30 MG Oral Tab Take 1-2 tablets by mouth every 4 to 6 hours as needed for Pain.      pantoprazole (PROTONIX) 40 MG Oral Tab EC Take 1 tablet (40 mg total) by mouth.      clopidogrel 75 MG Oral Tab       aspirin 81 MG Oral Tab EC Take 1 tablet (81 mg total) by mouth daily. 30 tablet 2    Multiple Vitamins-Minerals (ONE-A-DAY WOMENS 50+ ADVANTAGE) Oral Tab Take 1 tablet by mouth daily.      furosemide 20 MG Oral Tab  (Patient not taking: Reported on 8/7/2024)      rivaroxaban (XARELTO) 20 MG Oral Tab Take 1 tablet (20 mg total) by mouth. (Patient not taking: Reported on 8/7/2024)      Acetaminophen ER (TYLENOL 8 HOUR) 650 MG Oral Tab CR  (Patient not taking: Reported on 10/7/2024)      Multiple Vitamins-Minerals (HAIR/SKIN/NAILS) Oral Tab Take 1 tablet by mouth daily. (Patient not taking: Reported on 10/7/2024)        Past Medical History:    CAD (coronary artery disease)    Coronary atherosclerosis    Fatigue    High blood pressure     High cholesterol    Myocardial infarction (HCC)    Other and unspecified hyperlipidemia    Peripheral vascular disease (HCC)    Sleep apnea    Stented coronary artery    Unspecified essential hypertension    Wears glasses    Weight gain      Social History:  Social History     Socioeconomic History    Marital status:    Tobacco Use    Smoking status: Former     Current packs/day: 0.20     Average packs/day: 0.2 packs/day for 15.0 years (3.0 ttl pk-yrs)     Types: Cigarettes    Smokeless tobacco: Never    Tobacco comments:     Smokes 4 cigarettes daily.   Vaping Use    Vaping status: Never Used   Substance and Sexual Activity    Alcohol use: Yes     Alcohol/week: 6.0 standard drinks of alcohol     Types: 3 Glasses of wine, 3 Shots of liquor per week     Comment: occ    Drug use: Yes     Types: Cannabis     Comment: Edibles   Other Topics Concern    Caffeine Concern Yes     Comment: 1 cup of coffee daily.     Exercise No     Social Determinants of Health     Financial Resource Strain: Low Risk  (10/18/2023)    Financial Resource Strain     Difficulty of Paying Living Expenses: Not very hard     Med Affordability: No   Food Insecurity: No Food Insecurity (11/3/2023)    Food Insecurity     Food Insecurity: Never true   Transportation Needs: No Transportation Needs (11/3/2023)    Transportation Needs     Lack of Transportation: No   Housing Stability: Low Risk  (11/3/2023)    Housing Stability     Housing Instability: No        REVIEW OF SYSTEMS:   GENERAL HEALTH: feels well otherwise    EXAM:   /78 (BP Location: Left arm, Patient Position: Sitting, Cuff Size: large)   Pulse 79   Temp 98 °F (36.7 °C) (Temporal)   Ht 5' 7\" (1.702 m)   Wt 256 lb (116.1 kg)   LMP 01/09/2015   SpO2 94%   BMI 40.10 kg/m²   GENERAL: well developed, well nourished,in no apparent distress    ASSESSMENT AND PLAN:   1. Type 2 diabetes mellitus with other circulatory complication, without long-term current use of insulin  ([facility])  Increase Mounjaro to 10     discussed fruit consumption and portions      jillian 2 mo        2. Class 2 severe obesity due to excess calories with serious comorbidity and body mass index (BMI) of 39.0 to 39.9 in adult ([facility])  As above        3. Primary hypertension  Stable        The patient indicates understanding of these issues and agrees to the plan.  .

## 2024-10-29 NOTE — TELEPHONE ENCOUNTER
Methocarbamol 500 mg  Filled 5-15-24  Qty 30  1 refill  Future Appointments   Date Time Provider Department Center   12/9/2024  4:00 PM Adela Miller MD [facility] 5-15-24 TO

## 2024-10-30 RX ORDER — METHOCARBAMOL 500 MG/1
500 TABLET, FILM COATED ORAL 3 TIMES DAILY PRN
Qty: 30 TABLET | Refills: 1 | Status: SHIPPED | OUTPATIENT
Start: 2024-10-30

## 2024-11-12 RX ORDER — IRBESARTAN 300 MG/1
300 TABLET ORAL DAILY
Qty: 90 TABLET | Refills: 0 | Status: SHIPPED | OUTPATIENT
Start: 2024-11-12

## 2024-11-22 ENCOUNTER — HOSPITAL ENCOUNTER (OUTPATIENT)
Age: 64
Discharge: HOME | End: 2024-11-22
Attending: EMERGENCY MEDICINE
Payer: COMMERCIAL

## 2024-11-22 VITALS
DIASTOLIC BLOOD PRESSURE: 81 MMHG | BODY MASS INDEX: 39.24 KG/M2 | TEMPERATURE: 98 F | RESPIRATION RATE: 18 BRPM | HEIGHT: 67 IN | HEART RATE: 91 BPM | SYSTOLIC BLOOD PRESSURE: 124 MMHG | OXYGEN SATURATION: 97 % | WEIGHT: 250 LBS

## 2024-11-22 DIAGNOSIS — H65.92 LEFT SEROUS OTITIS MEDIA, UNSPECIFIED CHRONICITY: ICD-10-CM

## 2024-11-22 DIAGNOSIS — J02.9 VIRAL PHARYNGITIS: Primary | ICD-10-CM

## 2024-11-22 LAB — S PYO AG THROAT QL IA.RAPID: NEGATIVE

## 2024-11-22 PROCEDURE — 99213 OFFICE O/P EST LOW 20 MIN: CPT

## 2024-11-22 PROCEDURE — 87651 STREP A DNA AMP PROBE: CPT | Performed by: EMERGENCY MEDICINE

## 2024-11-22 PROCEDURE — 99214 OFFICE O/P EST MOD 30 MIN: CPT

## 2024-11-22 RX ORDER — METHYLPREDNISOLONE 4 MG/1
TABLET ORAL
Qty: 1 EACH | Refills: 0 | Status: SHIPPED | OUTPATIENT
Start: 2024-11-22

## 2024-11-22 NOTE — DISCHARGE INSTRUCTIONS
Follow-up with your primary care doctor  Take Tylenol or ibuprofen as needed for any pain  Take Medrol Dosepak as directed.  Return if any worsening symptoms or new concern.

## 2024-11-22 NOTE — ED PROVIDER NOTES
Patient Seen in: [facility]      History     Chief Complaint   Patient presents with    Sore Throat     Stated Complaint: Ear pain    Subjective:   HPI    64-year-old female with a history of coronary artery disease, history of hypercholesterolemia, myocardial infarction hyperlipidemia presents to the immediate care for complaints of sore throat and ear pain.  Patient's had the symptoms for the last several days.  Denies any fevers or chills.  Denies any productive cough or sputum.  Patient has no complaints of any chest pain shortness of breath.  Denies any myalgias.    Objective:     Past Medical History:    CAD (coronary artery disease)    Coronary atherosclerosis    Fatigue    High blood pressure    High cholesterol    Myocardial infarction (HCC)    Other and unspecified hyperlipidemia    Peripheral vascular disease (HCC)    Sleep apnea    Stented coronary artery    Unspecified essential hypertension    Wears glasses    Weight gain              Past Surgical History:   Procedure Laterality Date    Cabg      Cardiac surgery      stent    Tubal ligation                  Social History     Socioeconomic History    Marital status:    Tobacco Use    Smoking status: Former     Current packs/day: 0.20     Average packs/day: 0.2 packs/day for 15.0 years (3.0 ttl pk-yrs)     Types: Cigarettes    Smokeless tobacco: Never    Tobacco comments:     Smokes 4 cigarettes daily.   Vaping Use    Vaping status: Never Used   Substance and Sexual Activity    Alcohol use: Yes     Alcohol/week: 6.0 standard drinks of alcohol     Types: 3 Glasses of wine, 3 Shots of liquor per week     Comment: occ    Drug use: Not Currently     Types: Cannabis     Comment: Edibles   Other Topics Concern    Caffeine Concern Yes     Comment: 1 cup of coffee daily.     Exercise No     Social Drivers of Health     Financial Resource Strain: Low Risk  (10/18/2023)    Financial Resource Strain     Difficulty of Paying Living  Expenses: Not very hard     Med Affordability: No   Food Insecurity: No Food Insecurity (11/3/2023)    Food Insecurity     Food Insecurity: Never true   Transportation Needs: No Transportation Needs (11/3/2023)    Transportation Needs     Lack of Transportation: No   Housing Stability: Low Risk  (11/3/2023)    Housing Stability     Housing Instability: No              Review of Systems    Positive for stated complaint: Ear pain  Other systems are as noted in HPI.  Constitutional and vital signs reviewed.      All other systems reviewed and negative except as noted above.    Physical Exam     ED Triage Vitals [11/22/24 1604]   /81   Pulse 91   Resp 18   Temp 98.1 °F (36.7 °C)   Temp src Oral   SpO2 97 %   O2 Device None (Room air)       Current Vitals:   Vital Signs  BP: 124/81  Pulse: 91  Resp: 18  Temp: 98.1 °F (36.7 °C)  Temp src: Oral    Oxygen Therapy  SpO2: 97 %  O2 Device: None (Room air)        Physical Exam  General: Alert and oriented. No acute distress.  HEENT: Normocephalic. No evidence of trauma. Extraocular movements are intact.  Tympanic membranes show evidence of serous fluid bilaterally.  No perforation.  No injection or erythema.  Oropharynx shows minimal injection.  Uvula midline.  No tonsillar exudate.  Cardiovascular exam: Regular rate and rhythm  Lungs: Clear to auscultation bilaterally.  Abdomen: Soft, nondistended, nontender.  Extremities: No evidence of deformity. No clubbing or cyanosis.  Neuro: No focal deficit is noted.    ED Course     Labs Reviewed   RAPID STREP A - Normal     Differential diagnosis includes viral pharyngitis versus strep pharyngitis versus otitis media versus serous otitis media  No obvious source of infection.  Rapid strep is negative.  TM shows some serous fluid but no evidence of acute otitis media.  No clinical indication for oral antibiotics.  Patient will be discharged home with a Medrol Dosepak for symptomatic relief of her throat pain.  This also may  alleviate some of her ear pain and allow for decompression of her eustachian tubes.       MDM   Patient was screened and evaluated during this visit.   As a treating physician attending to the patient, I determined, within reasonable clinical confidence and prior to discharge, that an emergency medical condition was not or was no longer present.  There was no indication for further evaluation, treatment or admission on an emergency basis.  Comprehensive verbal and written discharge and follow-up instructions were provided to help prevent relapse or worsening.  Patient was instructed to follow-up with her primary care provider for further evaluation and treatment, but to return immediately to the ER for worsening, concerning, new, changing or persisting symptoms.  I discussed the case with the patient and they had no questions, complaints, or concerns.  Patient felt comfortable going home.    ^^Please note that this report has been produced using speech recognition software and may contain errors related to that system including, but not limited to, errors in grammar, punctuation, and spelling, as well as words and phrases that possibly may have been recognized inappropriately.  If there are any questions or concerns, contact the dictating provider for clarification        Medical Decision Making      Disposition and Plan     Clinical Impression:  1. Viral pharyngitis    2. Left serous otitis media, unspecified chronicity         Disposition:  Discharge  11/22/2024  4:34 pm    Follow-up:  Adela Miller MD  [address] [address] [phone]    Call   As needed, If symptoms worsen          Medications Prescribed:  Current Discharge Medication List        START taking these medications    Details   methylPREDNISolone (MEDROL) 4 MG Oral Tablet Therapy Pack Dosepack: take as directed  Qty: 1 each, Refills: 0                 Supplementary Documentation:

## 2024-11-22 NOTE — ED INITIAL ASSESSMENT (HPI)
Pt here c/o sore throat since Sat.  Also c/o left ear pain.    Denies cough/congestion.   Denies fever.

## 2024-11-26 RX ORDER — IRBESARTAN 300 MG/1
300 TABLET ORAL DAILY
Qty: 90 TABLET | Refills: 0 | OUTPATIENT
Start: 2024-11-26

## 2024-12-09 ENCOUNTER — OFFICE VISIT (OUTPATIENT)
Dept: INTERNAL MEDICINE CLINIC | Facility: CLINIC | Age: 64
End: 2024-12-09
Payer: COMMERCIAL

## 2024-12-09 VITALS
HEART RATE: 85 BPM | DIASTOLIC BLOOD PRESSURE: 80 MMHG | OXYGEN SATURATION: 95 % | TEMPERATURE: 98 F | WEIGHT: 252.63 LBS | BODY MASS INDEX: 39.65 KG/M2 | HEIGHT: 67 IN | SYSTOLIC BLOOD PRESSURE: 118 MMHG

## 2024-12-09 DIAGNOSIS — E66.01 CLASS 2 SEVERE OBESITY DUE TO EXCESS CALORIES WITH SERIOUS COMORBIDITY AND BODY MASS INDEX (BMI) OF 39.0 TO 39.9 IN ADULT (HCC): ICD-10-CM

## 2024-12-09 DIAGNOSIS — E66.812 CLASS 2 SEVERE OBESITY DUE TO EXCESS CALORIES WITH SERIOUS COMORBIDITY AND BODY MASS INDEX (BMI) OF 39.0 TO 39.9 IN ADULT (HCC): ICD-10-CM

## 2024-12-09 DIAGNOSIS — E11.59 TYPE 2 DIABETES MELLITUS WITH OTHER CIRCULATORY COMPLICATION, WITHOUT LONG-TERM CURRENT USE OF INSULIN (HCC): Primary | ICD-10-CM

## 2024-12-09 PROCEDURE — 99213 OFFICE O/P EST LOW 20 MIN: CPT | Performed by: FAMILY MEDICINE

## 2024-12-09 RX ORDER — TIRZEPATIDE 12.5 MG/.5ML
12.5 INJECTION, SOLUTION SUBCUTANEOUS WEEKLY
Qty: 2 ML | Refills: 1 | Status: SHIPPED | OUTPATIENT
Start: 2024-12-09

## 2024-12-09 RX ORDER — TIRZEPATIDE 10 MG/.5ML
INJECTION, SOLUTION SUBCUTANEOUS
COMMUNITY
Start: 2024-11-19

## 2024-12-09 NOTE — PROGRESS NOTES
Marquita Cooper is a 64 year old female.  HPI:   Here for f/u on the weight    Was on a steroid for sore throat few week ago    feels may have hurt her weight loss      On Mounjaro 10 as directed         walking more    watching portions   No issue w med   Overall does feel better     Wondering if needs to change med to other injxtable agent          Current Outpatient Medications   Medication Sig Dispense Refill    MOUNJARO 10 MG/0.5ML Subcutaneous Solution Auto-injector ADMINISTER 10 MG UNDER THE SKIN 1 TIME A WEEK FOR 4 DOSES      IRBESARTAN 300 MG Oral Tab TAKE 1 TABLET(300 MG) BY MOUTH DAILY 90 tablet 0    METHOCARBAMOL 500 MG Oral Tab TAKE 1 TABLET(500 MG) BY MOUTH THREE TIMES DAILY AS NEEDED 30 tablet 1    METOPROLOL SUCCINATE  MG Oral Tablet 24 Hr TAKE 1 TABLET(100 MG) BY MOUTH DAILY 90 tablet 0    FUROSEMIDE 40 MG Oral Tab TAKE 1 TABLET BY MOUTH DAILY 90 tablet 0    rosuvastatin 20 MG Oral Tab Take 1 tablet (20 mg total) by mouth nightly. 90 tablet 0    Sennosides-Docusate Sodium 8.6-50 MG Oral Cap Take 1 capsule by mouth daily as needed. 30 capsule 1    acetaminophen-codeine 300-30 MG Oral Tab Take 1-2 tablets by mouth every 4 to 6 hours as needed for Pain.      Multiple Vitamins-Minerals (ONE-A-DAY WOMENS 50+ ADVANTAGE) Oral Tab Take 1 tablet by mouth daily.      methylPREDNISolone (MEDROL) 4 MG Oral Tablet Therapy Pack Dosepack: take as directed (Patient not taking: Reported on 12/9/2024) 1 each 0    furosemide 20 MG Oral Tab  (Patient not taking: Reported on 12/9/2024)      rivaroxaban (XARELTO) 20 MG Oral Tab Take 1 tablet (20 mg total) by mouth. (Patient not taking: Reported on 12/9/2024)      Acetaminophen ER (TYLENOL 8 HOUR) 650 MG Oral Tab CR  (Patient not taking: Reported on 12/9/2024)      pantoprazole (PROTONIX) 40 MG Oral Tab EC Take 1 tablet (40 mg total) by mouth. (Patient not taking: Reported on 12/9/2024)      aspirin 81 MG Oral Tab EC Take 1 tablet (81 mg total) by mouth daily. (Patient  not taking: Reported on 12/9/2024) 30 tablet 2    Multiple Vitamins-Minerals (HAIR/SKIN/NAILS) Oral Tab Take 1 tablet by mouth daily. (Patient not taking: Reported on 12/9/2024)        Past Medical History:    CAD (coronary artery disease)    Coronary atherosclerosis    Fatigue    High blood pressure    High cholesterol    Myocardial infarction (HCC)    Other and unspecified hyperlipidemia    Peripheral vascular disease (HCC)    Sleep apnea    Stented coronary artery    Unspecified essential hypertension    Wears glasses    Weight gain      Social History:  Social History     Socioeconomic History    Marital status:    Tobacco Use    Smoking status: Former     Current packs/day: 0.20     Average packs/day: 0.2 packs/day for 15.0 years (3.0 ttl pk-yrs)     Types: Cigarettes    Smokeless tobacco: Never    Tobacco comments:     Smokes 4 cigarettes daily.   Vaping Use    Vaping status: Never Used   Substance and Sexual Activity    Alcohol use: Yes     Alcohol/week: 6.0 standard drinks of alcohol     Types: 3 Glasses of wine, 3 Shots of liquor per week     Comment: occ    Drug use: Not Currently     Types: Cannabis     Comment: Edibles   Other Topics Concern    Caffeine Concern Yes     Comment: 1 cup of coffee daily.     Exercise No     Social Drivers of Health     Financial Resource Strain: Low Risk  (10/18/2023)    Financial Resource Strain     Difficulty of Paying Living Expenses: Not very hard     Med Affordability: No   Food Insecurity: No Food Insecurity (11/3/2023)    Food Insecurity     Food Insecurity: Never true   Transportation Needs: No Transportation Needs (11/3/2023)    Transportation Needs     Lack of Transportation: No   Housing Stability: Low Risk  (11/3/2023)    Housing Stability     Housing Instability: No        REVIEW OF SYSTEMS:   GENERAL HEALTH: feels well otherwise       EXAM:   /80 (BP Location: Left arm, Patient Position: Sitting, Cuff Size: large)   Pulse 85   Temp 98 °F (36.7  °C) (Temporal)   Ht 5' 7\" (1.702 m)   Wt 252 lb 9.6 oz (114.6 kg)   LMP 01/09/2015   SpO2 95%   BMI 39.56 kg/m²   GENERAL: well developed, well nourished,in no apparent distress       ASSESSMENT AND PLAN:   1. Type 2 diabetes mellitus with other circulatory complication, without long-term current use of insulin (HCC)  Last A1c great   increase monjaro to 12.5    I feel the steroids may have hindered her #s     jillian 2 mo        2. Class 2 severe obesity due to excess calories with serious comorbidity and body mass index (BMI) of 39.0 to 39.9 in adult (HCC)  As above        The patient indicates understanding of these issues and agrees to the plan.  .

## 2024-12-30 RX ORDER — METOPROLOL SUCCINATE 100 MG/1
100 TABLET, EXTENDED RELEASE ORAL DAILY
Qty: 90 TABLET | Refills: 0 | Status: SHIPPED | OUTPATIENT
Start: 2024-12-30

## 2024-12-30 NOTE — TELEPHONE ENCOUNTER
Requesting   Name from pharmacy: METOPROLOL ER SUCCINATE 100MG TABS          Will file in chart as: METOPROLOL SUCCINATE  MG Oral Tablet 24 Hr    Sig: TAKE 1 TABLET(100 MG) BY MOUTH DAILY    Disp: 90 tablet    Refills: 0 (Pharmacy requested: Not specified)    Start: 12/29/2024    Class: Normal    Non-formulary    Last ordered: 3 months ago (9/27/2024) by Adela Miller MD    Last refill: 9/27/2024    Rx #: [id number]    Hypertension Medications Protocol [id number]/29/2024 04:59 PM   Protocol Details CMP or BMP in past 12 months    Last BP reading less than 140/90    In person appointment or virtual visit in the past 12 mos or appointment in next 3 mos    EGFRCR or GFRAA > 50        LOV: 12/9/2024  RTC: 2 months   Last Relevant Labs: 1/2/2024  Filled: 9/27/2024 #90 with 0 refills    Future Appointments   Date Time Provider Department Center   2/24/2025  4:00 PM Adela Miller MD [facility]

## 2025-01-13 ENCOUNTER — TELEPHONE (OUTPATIENT)
Dept: INTERNAL MEDICINE CLINIC | Facility: CLINIC | Age: 65
End: 2025-01-13

## 2025-01-13 NOTE — TELEPHONE ENCOUNTER
Pt is asking if we can send in an order for Yellow Fever Vaccine for herself and her  Ellis Cooper. Needs it sent to the [facility] in [place] on [address].

## 2025-02-13 RX ORDER — TIRZEPATIDE 12.5 MG/.5ML
12.5 INJECTION, SOLUTION SUBCUTANEOUS WEEKLY
Qty: 2 ML | Refills: 0 | Status: SHIPPED | OUTPATIENT
Start: 2025-02-13

## 2025-02-13 NOTE — TELEPHONE ENCOUNTER
Requesting    Name from pharmacy: MOUNJARO 12.5MG/0.5ML INJ (4 PENS)         Will file in chart as: MOUNJARO 12.5 MG/0.5ML Subcutaneous Solution Auto-injector    Sig: ADMINISTER 12.5 MG UNDER THE SKIN 1 TIME A WEEK    Disp: 2 mL    Refills: 1 (Pharmacy requested: Not specified)    Start: 2/13/2025    Class: Normal    Non-formulary    Last ordered: 2 months ago (12/9/2024) by Adela Miller MD    Last refill: 1/16/2025    Rx #: [id number]    Diabetes Medication Protocol [id number]/13/2025 12:25 PM   Protocol Details Last A1C < 7.5 and within past 6 months    EGFRCR or GFRAA > 50    GFR in the past 12 months    In person appointment or virtual visit in the past 6 mos or appointment in next 3 mos    Microalbumin procedure in past 12 months or taking ACE/ARB    Medication is active on med list        LOV: 12/9/2024  RTC: 2 months   Last Relevant Labs: 8/7/2024  Filled: 12/9/2024 #2 mL with 1 refills    Future Appointments   Date Time Provider Department Center   2/24/2025  4:00 PM Adela Miller MD [facility]

## 2025-02-19 ENCOUNTER — TELEPHONE (OUTPATIENT)
Dept: INTERNAL MEDICINE CLINIC | Facility: CLINIC | Age: 65
End: 2025-02-19

## 2025-02-19 NOTE — TELEPHONE ENCOUNTER
Incoming fax from [facility] Diabetic eye exam done on 2/18/2025 by     Placed in TO in basket for review

## 2025-02-24 ENCOUNTER — OFFICE VISIT (OUTPATIENT)
Dept: INTERNAL MEDICINE CLINIC | Facility: CLINIC | Age: 65
End: 2025-02-24
Payer: COMMERCIAL

## 2025-02-24 VITALS
DIASTOLIC BLOOD PRESSURE: 80 MMHG | TEMPERATURE: 98 F | HEART RATE: 85 BPM | HEIGHT: 67 IN | BODY MASS INDEX: 39.65 KG/M2 | OXYGEN SATURATION: 90 % | WEIGHT: 252.63 LBS | SYSTOLIC BLOOD PRESSURE: 120 MMHG

## 2025-02-24 DIAGNOSIS — I25.810 CORONARY ARTERY DISEASE INVOLVING OTHER CORONARY ARTERY BYPASS GRAFT WITHOUT ANGINA PECTORIS: ICD-10-CM

## 2025-02-24 DIAGNOSIS — E66.9 OBESITY (BMI 30-39.9): ICD-10-CM

## 2025-02-24 DIAGNOSIS — I10 PRIMARY HYPERTENSION: ICD-10-CM

## 2025-02-24 DIAGNOSIS — E11.59 TYPE 2 DIABETES MELLITUS WITH OTHER CIRCULATORY COMPLICATION, WITHOUT LONG-TERM CURRENT USE OF INSULIN (HCC): Primary | ICD-10-CM

## 2025-02-24 PROCEDURE — 99213 OFFICE O/P EST LOW 20 MIN: CPT | Performed by: FAMILY MEDICINE

## 2025-02-24 RX ORDER — FUROSEMIDE 40 MG/1
40 TABLET ORAL DAILY
Qty: 90 TABLET | Refills: 0 | Status: SHIPPED | OUTPATIENT
Start: 2025-02-24

## 2025-02-24 RX ORDER — MEFLOQUINE HYDROCHLORIDE 250 MG/1
250 TABLET ORAL
Qty: 7 TABLET | Refills: 0 | Status: SHIPPED | OUTPATIENT
Start: 2025-02-24

## 2025-02-24 RX ORDER — TIRZEPATIDE 15 MG/.5ML
15 INJECTION, SOLUTION SUBCUTANEOUS WEEKLY
Qty: 6 ML | Refills: 0 | Status: SHIPPED | OUTPATIENT
Start: 2025-02-24

## 2025-02-24 NOTE — TELEPHONE ENCOUNTER
Requesting   Name from pharmacy: FUROSEMIDE 40MG TABLETS          Will file in chart as: FUROSEMIDE 40 MG Oral Tab    Sig: TAKE 1 TABLET BY MOUTH DAILY    Disp: 90 tablet    Refills: 0 (Pharmacy requested: Not specified)    Start: 2/24/2025    Class: Normal    Non-formulary    Last ordered: 6 months ago (8/28/2024) by Adela Miller MD    Last refill: 11/11/2024    Rx #: [id number]    Hypertension Medications Protocol [id number]/24/2025 05:39 AM   Protocol Details CMP or BMP in past 12 months    EGFRCR or GFRAA > 50    Last BP reading less than 140/90    In person appointment or virtual visit in the past 12 mos or appointment in next 3 mos    Medication is active on med list        LOV: 12/9/2024  RTC: 2 months   Last Relevant Labs: 1/2/2024  Filled: 11/11/2024 #90 with 0 refills    Future Appointments   Date Time Provider Department Center   2/24/2025  4:00 PM Adela Miller MD [facility]

## 2025-02-24 NOTE — PROGRESS NOTES
Marquita Cooper is a 64 year old female.  HPI:   Here for f/u on the weight and meds    on 12.5 mounjaro    overall feels well   breathing is good   heart is good   no issues w med       no feet issues    Does check occ     Needs malaria med   leaving in April for 10 days        Current Outpatient Medications   Medication Sig Dispense Refill    FUROSEMIDE 40 MG Oral Tab TAKE 1 TABLET BY MOUTH DAILY 90 tablet 0    Tirzepatide (MOUNJARO) 12.5 MG/0.5ML Subcutaneous Solution Auto-injector Inject 12.5 mg into the skin once a week. 2 mL 0    METOPROLOL SUCCINATE  MG Oral Tablet 24 Hr TAKE 1 TABLET(100 MG) BY MOUTH DAILY 90 tablet 0    IRBESARTAN 300 MG Oral Tab TAKE 1 TABLET(300 MG) BY MOUTH DAILY 90 tablet 0    METHOCARBAMOL 500 MG Oral Tab TAKE 1 TABLET(500 MG) BY MOUTH THREE TIMES DAILY AS NEEDED 30 tablet 1    rosuvastatin 20 MG Oral Tab Take 1 tablet (20 mg total) by mouth nightly. 90 tablet 0    Sennosides-Docusate Sodium 8.6-50 MG Oral Cap Take 1 capsule by mouth daily as needed. 30 capsule 1    pantoprazole (PROTONIX) 40 MG Oral Tab EC Take 1 tablet (40 mg total) by mouth.      aspirin 81 MG Oral Tab EC Take 1 tablet (81 mg total) by mouth daily. 30 tablet 2    Multiple Vitamins-Minerals (ONE-A-DAY WOMENS 50+ ADVANTAGE) Oral Tab Take 1 tablet by mouth daily.      Multiple Vitamins-Minerals (HAIR/SKIN/NAILS) Oral Tab Take 1 tablet by mouth daily.      amoxicillin clavulanate 875-125 MG Oral Tab TAKE 1 TABLET BY MOUTH EVERY 12 HOURS UNTIL GONE (Patient not taking: Reported on 2/24/2025)      MOUNJARO 10 MG/0.5ML Subcutaneous Solution Auto-injector ADMINISTER 10 MG UNDER THE SKIN 1 TIME A WEEK FOR 4 DOSES (Patient not taking: Reported on 2/24/2025)      methylPREDNISolone (MEDROL) 4 MG Oral Tablet Therapy Pack Dosepack: take as directed (Patient not taking: Reported on 2/24/2025) 1 each 0    furosemide 20 MG Oral Tab  (Patient not taking: Reported on 8/7/2024)      rivaroxaban (XARELTO) 20 MG Oral Tab Take 1  tablet (20 mg total) by mouth. (Patient not taking: Reported on 8/7/2024)      acetaminophen-codeine 300-30 MG Oral Tab Take 1-2 tablets by mouth every 4 to 6 hours as needed for Pain. (Patient not taking: Reported on 2/24/2025)      Acetaminophen ER (TYLENOL 8 HOUR) 650 MG Oral Tab CR  (Patient not taking: Reported on 10/7/2024)        Past Medical History:    CAD (coronary artery disease)    Coronary atherosclerosis    Fatigue    High blood pressure    High cholesterol    Myocardial infarction (HCC)    Other and unspecified hyperlipidemia    Peripheral vascular disease    Sleep apnea    Stented coronary artery    Unspecified essential hypertension    Wears glasses    Weight gain      Social History:  Social History     Socioeconomic History    Marital status:    Tobacco Use    Smoking status: Former     Current packs/day: 0.20     Average packs/day: 0.2 packs/day for 15.0 years (3.0 ttl pk-yrs)     Types: Cigarettes    Smokeless tobacco: Never    Tobacco comments:     Smokes 4 cigarettes daily.   Vaping Use    Vaping status: Never Used   Substance and Sexual Activity    Alcohol use: Yes     Alcohol/week: 6.0 standard drinks of alcohol     Types: 3 Glasses of wine, 3 Shots of liquor per week     Comment: occ    Drug use: Not Currently     Types: Cannabis     Comment: Edibles   Other Topics Concern    Caffeine Concern Yes     Comment: 1 cup of coffee daily.     Exercise No     Social Drivers of Health     Food Insecurity: No Food Insecurity (11/3/2023)    Food Insecurity     Food Insecurity: Never true   Transportation Needs: No Transportation Needs (11/3/2023)    Transportation Needs     Lack of Transportation: No   Housing Stability: Low Risk  (11/3/2023)    Housing Stability     Housing Instability: No        REVIEW OF SYSTEMS:   GENERAL HEALTH: feels well otherwise  SKIN: denies rashes  RESPIRATORY: denies shortness of breath  CARDIOVASCULAR: denies chest pain   GI: denies abdominal pain  NEURO: denies  headaches    EXAM:   /80   Pulse 85   Temp 98 °F (36.7 °C) (Temporal)   Ht 5' 7\" (1.702 m)   Wt 252 lb 9.6 oz (114.6 kg)   LMP 01/09/2015   SpO2 90%   BMI 39.56 kg/m²   GENERAL: well developed, well nourished,in no apparent distress  SKIN: no rashes  NECK: supple,no adenopathy  LUNGS: clear to auscultation  CARDIO: RRR without murmur  EXTREMITIES: Bilateral barefoot skin diabetic exam is normal, visualized feet and the appearance is normal.  Bilateral monofilament/sensation of both feet is normal.  Pulsation pedal pulse exam of both lower legs/feet is normal as well.    ASSESSMENT AND PLAN:   1. Type 2 diabetes mellitus with other circulatory complication, without long-term current use of insulin (HCC)  Last A1c good    labs ordered for AM    Increae mounjaro to 15     f/u 3 mo   - Comp Metabolic Panel (14); Future  - Lipid Panel; Future  - Hemoglobin A1C; Future  - Microalb/Creat Ratio, Random Urine; Future    2. Obesity (BMI 30-39.9)  Stable w weight     Increased activty can help as well w the warmer weather        3. Primary hypertension  Stable BP    CPM         4. Coronary artery disease involving other coronary artery bypass graft without angina pectoris  Appears stable       Mefloquine ordered    discussed use        The patient indicates understanding of these issues and agrees to the plan.  .

## 2025-02-27 ENCOUNTER — TELEPHONE (OUTPATIENT)
Dept: INTERNAL MEDICINE CLINIC | Facility: CLINIC | Age: 65
End: 2025-02-27

## 2025-02-27 RX ORDER — ATOVAQUONE AND PROGUANIL HYDROCHLORIDE 250; 100 MG/1; MG/1
1 TABLET, FILM COATED ORAL DAILY
Qty: 19 TABLET | Refills: 0 | Status: SHIPPED | OUTPATIENT
Start: 2025-02-27

## 2025-02-27 NOTE — TELEPHONE ENCOUNTER
Spoke with patient to inform of medication change. Patient will call other pharmacies to see if she can get mefloquine and will call us back.

## 2025-02-27 NOTE — TELEPHONE ENCOUNTER
Received fax from pharmacy that Mefloquine is on back order and has been for months    Requesting alternative medication     Please advise

## 2025-03-05 RX ORDER — IRBESARTAN 300 MG/1
300 TABLET ORAL DAILY
Qty: 90 TABLET | Refills: 0 | Status: SHIPPED | OUTPATIENT
Start: 2025-03-05

## 2025-03-05 NOTE — TELEPHONE ENCOUNTER
Requesting    Name from pharmacy: IRBESARTAN 300MG TABLETS         Will file in chart as: IRBESARTAN 300 MG Oral Tab    Sig: TAKE 1 TABLET(300 MG) BY MOUTH DAILY    Disp: 90 tablet    Refills: 0 (Pharmacy requested: Not specified)    Start: 3/4/2025    Class: Normal    Non-formulary    Last ordered: 3 months ago (11/12/2024) by Adela Miller MD    Last refill: 11/12/2024    Rx #: [id number]    Hypertension Medications Protocol [id number]/04/2025 04:57 PM   Protocol Details CMP or BMP in past 12 months    EGFRCR or GFRAA > 50    Last BP reading less than 140/90    In person appointment or virtual visit in the past 12 mos or appointment in next 3 mos    Medication is active on med list           LOV: 2/24/2025  RTC: 3 months   Last Relevant Labs: 1/2/2024  Filled: 11/12/2024 #90 with 0 refills    Future Appointments   Date Time Provider Department Center   5/19/2025  4:00 PM Adela Miller MD [facility] [place]

## 2025-05-07 RX ORDER — METHOCARBAMOL 500 MG/1
500 TABLET, FILM COATED ORAL 3 TIMES DAILY PRN
Qty: 30 TABLET | Refills: 1 | Status: SHIPPED | OUTPATIENT
Start: 2025-05-07

## 2025-05-07 NOTE — TELEPHONE ENCOUNTER
Requesting    Name from pharmacy: METHOCARBAMOL 500MG TABLETS         Will file in chart as: METHOCARBAMOL 500 MG Oral Tab    Sig: TAKE 1 TABLET(500 MG) BY MOUTH THREE TIMES DAILY AS NEEDED    Disp: 30 tablet    Refills: 1 (Pharmacy requested: Not specified)    Start: 5/7/2025    Class: Normal    Non-formulary    Last ordered: 6 months ago (10/30/2024) by Adela Miller MD    Last refill: 1/10/2025    Rx #: [id number]     LOV: 2/24/2025  RTC: 3 months   Last Relevant Labs: na  Filled: 1/10/2025 #30 with 0 refills    Future Appointments   Date Time Provider Department Center   5/19/2025  4:00 PM Adela Miller MD [facility] [place]

## 2025-05-17 ENCOUNTER — LAB ENCOUNTER (OUTPATIENT)
Dept: LAB | Age: 65
End: 2025-05-17
Attending: FAMILY MEDICINE
Payer: COMMERCIAL

## 2025-05-17 DIAGNOSIS — E11.59 TYPE 2 DIABETES MELLITUS WITH OTHER CIRCULATORY COMPLICATION, WITHOUT LONG-TERM CURRENT USE OF INSULIN (HCC): ICD-10-CM

## 2025-05-17 LAB
ALBUMIN SERPL-MCNC: 4.7 G/DL (ref 3.2–4.8)
ALBUMIN/GLOB SERPL: 1.6 {RATIO} (ref 1–2)
ALP LIVER SERPL-CCNC: 86 U/L (ref 50–130)
ALT SERPL-CCNC: 31 U/L (ref 10–49)
ANION GAP SERPL CALC-SCNC: 10 MMOL/L (ref 0–18)
AST SERPL-CCNC: 27 U/L (ref ?–34)
BILIRUB SERPL-MCNC: 0.7 MG/DL (ref 0.2–1.1)
BUN BLD-MCNC: 17 MG/DL (ref 9–23)
CALCIUM BLD-MCNC: 9.8 MG/DL (ref 8.7–10.6)
CHLORIDE SERPL-SCNC: 107 MMOL/L (ref 98–112)
CHOLEST SERPL-MCNC: 133 MG/DL (ref ?–200)
CO2 SERPL-SCNC: 26 MMOL/L (ref 21–32)
CREAT BLD-MCNC: 1.4 MG/DL (ref 0.55–1.02)
CREAT UR-SCNC: 189.6 MG/DL
EGFRCR SERPLBLD CKD-EPI 2021: 43 ML/MIN/1.73M2 (ref 60–?)
EST. AVERAGE GLUCOSE BLD GHB EST-MCNC: 134 MG/DL (ref 68–126)
FASTING PATIENT LIPID ANSWER: YES
FASTING STATUS PATIENT QL REPORTED: YES
GLOBULIN PLAS-MCNC: 3 G/DL (ref 2–3.5)
GLUCOSE BLD-MCNC: 100 MG/DL (ref 70–99)
HBA1C MFR BLD: 6.3 % (ref ?–5.7)
HDLC SERPL-MCNC: 48 MG/DL (ref 40–59)
LDLC SERPL CALC-MCNC: 66 MG/DL (ref ?–100)
MICROALBUMIN UR-MCNC: 0.4 MG/DL
MICROALBUMIN/CREAT 24H UR-RTO: 2.1 UG/MG (ref ?–30)
NONHDLC SERPL-MCNC: 85 MG/DL (ref ?–130)
OSMOLALITY SERPL CALC.SUM OF ELEC: 298 MOSM/KG (ref 275–295)
POTASSIUM SERPL-SCNC: 4.1 MMOL/L (ref 3.5–5.1)
PROT SERPL-MCNC: 7.7 G/DL (ref 5.7–8.2)
SODIUM SERPL-SCNC: 143 MMOL/L (ref 136–145)
TRIGL SERPL-MCNC: 105 MG/DL (ref 30–149)
VLDLC SERPL CALC-MCNC: 16 MG/DL (ref 0–30)

## 2025-05-17 PROCEDURE — 82043 UR ALBUMIN QUANTITATIVE: CPT

## 2025-05-17 PROCEDURE — 80053 COMPREHEN METABOLIC PANEL: CPT

## 2025-05-17 PROCEDURE — 80061 LIPID PANEL: CPT

## 2025-05-17 PROCEDURE — 82570 ASSAY OF URINE CREATININE: CPT

## 2025-05-17 PROCEDURE — 36415 COLL VENOUS BLD VENIPUNCTURE: CPT

## 2025-05-17 PROCEDURE — 83036 HEMOGLOBIN GLYCOSYLATED A1C: CPT

## 2025-05-19 ENCOUNTER — OFFICE VISIT (OUTPATIENT)
Dept: INTERNAL MEDICINE CLINIC | Facility: CLINIC | Age: 65
End: 2025-05-19
Payer: COMMERCIAL

## 2025-05-19 VITALS
BODY MASS INDEX: 37.92 KG/M2 | TEMPERATURE: 98 F | HEART RATE: 80 BPM | OXYGEN SATURATION: 98 % | DIASTOLIC BLOOD PRESSURE: 80 MMHG | SYSTOLIC BLOOD PRESSURE: 114 MMHG | HEIGHT: 67 IN | WEIGHT: 241.63 LBS

## 2025-05-19 DIAGNOSIS — M62.838 MUSCLE SPASM OF BOTH LOWER LEGS: ICD-10-CM

## 2025-05-19 DIAGNOSIS — Z12.31 VISIT FOR SCREENING MAMMOGRAM: ICD-10-CM

## 2025-05-19 DIAGNOSIS — R19.7 DIARRHEA OF PRESUMED INFECTIOUS ORIGIN: ICD-10-CM

## 2025-05-19 DIAGNOSIS — E78.00 PURE HYPERCHOLESTEROLEMIA: ICD-10-CM

## 2025-05-19 DIAGNOSIS — F32.A DEPRESSION, UNSPECIFIED DEPRESSION TYPE: ICD-10-CM

## 2025-05-19 DIAGNOSIS — E11.59 TYPE 2 DIABETES MELLITUS WITH OTHER CIRCULATORY COMPLICATION, WITHOUT LONG-TERM CURRENT USE OF INSULIN (HCC): Primary | ICD-10-CM

## 2025-05-19 DIAGNOSIS — I10 PRIMARY HYPERTENSION: ICD-10-CM

## 2025-05-19 DIAGNOSIS — E66.9 OBESITY (BMI 30-39.9): ICD-10-CM

## 2025-05-19 DIAGNOSIS — R79.89 ELEVATED SERUM CREATININE: ICD-10-CM

## 2025-05-19 PROCEDURE — 99214 OFFICE O/P EST MOD 30 MIN: CPT | Performed by: FAMILY MEDICINE

## 2025-05-19 RX ORDER — FUROSEMIDE 40 MG/1
40 TABLET ORAL DAILY
Qty: 90 TABLET | Refills: 0 | Status: SHIPPED | OUTPATIENT
Start: 2025-05-19

## 2025-05-19 RX ORDER — IRBESARTAN 300 MG/1
300 TABLET ORAL DAILY
Qty: 90 TABLET | Refills: 0 | Status: SHIPPED | OUTPATIENT
Start: 2025-05-19

## 2025-05-19 RX ORDER — ROSUVASTATIN CALCIUM 20 MG/1
20 TABLET, COATED ORAL NIGHTLY
Qty: 90 TABLET | Refills: 2 | Status: SHIPPED | OUTPATIENT
Start: 2025-05-19

## 2025-05-19 RX ORDER — METOPROLOL SUCCINATE 100 MG/1
100 TABLET, EXTENDED RELEASE ORAL DAILY
Qty: 90 TABLET | Refills: 0 | Status: SHIPPED | OUTPATIENT
Start: 2025-05-19

## 2025-05-19 NOTE — PROGRESS NOTES
The following individual(s) verbally consented to be recorded using ambient AI listening technology and understand that they can each withdraw their consent to this listening technology at any point by asking the clinician to turn off or pause the recording:    Patient name: Marquita Cooper        History of Present Illness  Marquita Cooper is a 64 year old female with hypertension, diabetes, and hyperlipidemia who presents for a follow-up on blood pressure, weight, cholesterol, and diabetes management.    She has lost eleven pounds, attributed to increased physical activity and dietary changes following an illness during a recent trip to [place]. She experienced severe diarrhea after consuming couscous salad on the plane, which persisted for a week after returning home. Her girlfriend also experienced mild symptoms but recovered quickly with antibiotics. Since then, she has been eating smaller portions and engaging in regular walking, aiming for at least 45 minutes to an hour every other day.    She experiences muscle spasms in her feet and lower legs, particularly at night, which she attributes to possible dehydration or electrolyte imbalance. A severe episode last week woke her from sleep, which she managed by taking a pinch of salt and drinking water. She has been using sports cream for relief. Recent blood work showed normal potassium and sodium levels.    Her diabetes management shows improvement with an A1c of 6.3, the best in three years. She is currently on 15 mg of Mounjaro.    She experiences emotional distress related to memories of her sister and father, leading to occasional 'down days.' She was previously on Wellbutrin for smoking cessation and depression but is not currently on any antidepressants. She sean by staying active and engaging in social activities, including planning future trips with friends.    No chest pain, pressure, or heaviness with physical activity. She reports being winded during a  climb but recovered after resting.    Physical Exam  Vitals:    05/19/25 1556   BP: 114/80   Pulse: 80   Temp: 97.8 °F (36.6 °C)      Weight 241          GENERAL: well developed, well nourished,in no apparent distress  SKIN: no rashes  NECK: supple,no adenopathy  LUNGS: clear to auscultation  CARDIO: RRR without murmur  EXTREMITIES: no edema  Results  LABS  A1c: 6.3 (05/16/2025)    Assessment & Plan  Type 2 diabetes mellitus with circulatory complications  A1c at 6.3 indicates improved glycemic control. Mounjaro effective for glycemic and weight management.   Discussed labs    - Continue Tirzepatide (Mounjaro) 12.5 MG/0.5ML Subcutaneous once a week.      - Monitor blood glucose levels regularly.    Obesity  Weight decreased by 11 pounds recently, totaling 15-pound loss over six months. No adverse effects.  - Encourage continued physical activity, aiming for at least 45 minutes of walking every other day.  - Monitor weight regularly.    Primary hypertension  Blood pressure well-controlled with current medication regimen.  - Refill Metoprolol succinate  MG Oral daily.  - Refill Irbesartan 300 MG Oral daily.    Muscle spasms  Intermittent spasms in feet and lower legs, possibly related to activity or dehydration. Electrolyte levels normal. Symptoms improve with hydration and topical treatment.  - Increase fluid intake to ensure proper hydration.  - Use Bengay or similar topical treatment for muscle spasms as needed.    Depression  Occasional low mood and emotional distress related to personal losses. Managed without medication.  - Monitor mood and emotional well-being.  - Consider re-evaluation for antidepressant therapy if symptoms worsen.    Diarrhea   resolved    MMG ordered        Elevated Cr    jillian 1 mo

## 2025-05-23 ENCOUNTER — HOSPITAL ENCOUNTER (OUTPATIENT)
Dept: MAMMOGRAPHY | Age: 65
Discharge: HOME | End: 2025-05-23
Attending: FAMILY MEDICINE
Payer: COMMERCIAL

## 2025-05-23 ENCOUNTER — TELEPHONE (OUTPATIENT)
Dept: INTERNAL MEDICINE CLINIC | Facility: CLINIC | Age: 65
End: 2025-05-23

## 2025-05-23 DIAGNOSIS — Z12.31 VISIT FOR SCREENING MAMMOGRAM: ICD-10-CM

## 2025-05-23 DIAGNOSIS — N63.0 MASS OF BREAST, UNSPECIFIED LATERALITY: Primary | ICD-10-CM

## 2025-05-23 PROCEDURE — 77063 BREAST TOMOSYNTHESIS BI: CPT | Performed by: FAMILY MEDICINE

## 2025-05-23 PROCEDURE — 77067 SCR MAMMO BI INCL CAD: CPT | Performed by: FAMILY MEDICINE

## 2025-05-23 NOTE — TELEPHONE ENCOUNTER
Per EPIC screening mammogram not completed, pended order for diagnostic bilateral mammogram if appropriate.

## 2025-05-23 NOTE — TELEPHONE ENCOUNTER
Janett  Replaced by [facility]  # [phone]     Janett says pt came in for screening mammogram but has a lump which Janett felt and confirmed was there. Janett is requesting a new order for diagnostic mammogram. Please advise.

## 2025-05-26 RX ORDER — IRBESARTAN 300 MG/1
300 TABLET ORAL DAILY
Qty: 90 TABLET | Refills: 0 | OUTPATIENT
Start: 2025-05-26

## 2025-05-28 ENCOUNTER — HOSPITAL ENCOUNTER (OUTPATIENT)
Dept: MAMMOGRAPHY | Facility: HOSPITAL | Age: 65
Discharge: HOME | End: 2025-05-28
Attending: FAMILY MEDICINE
Payer: COMMERCIAL

## 2025-05-28 DIAGNOSIS — N63.0 MASS OF BREAST, UNSPECIFIED LATERALITY: ICD-10-CM

## 2025-05-28 PROCEDURE — 77062 BREAST TOMOSYNTHESIS BI: CPT | Performed by: FAMILY MEDICINE

## 2025-05-28 PROCEDURE — 76642 ULTRASOUND BREAST LIMITED: CPT | Performed by: FAMILY MEDICINE

## 2025-05-28 PROCEDURE — 77066 DX MAMMO INCL CAD BI: CPT | Performed by: FAMILY MEDICINE

## 2025-05-29 RX ORDER — FUROSEMIDE 40 MG/1
40 TABLET ORAL DAILY
Qty: 90 TABLET | Refills: 0 | OUTPATIENT
Start: 2025-05-29

## 2025-05-29 NOTE — TELEPHONE ENCOUNTER
Requesting    Name from pharmacy: FUROSEMIDE 40MG TABLETS         Will file in chart as: FUROSEMIDE 40 MG Oral Tab     Possible duplicate: Hoshoaib to review recent actions on this medication    Sig: TAKE 1 TABLET BY MOUTH DAILY    Disp: 90 tablet    Refills: 0 (Pharmacy requested: Not specified)    Start: 5/29/2025    Class: Normal    Non-formulary    Last ordered: 1 week ago (5/19/2025) by Adela Miller MD    Last refill: 2/24/2025    Rx #: [id number]    Hypertension Medications Protocol Mgyvdr1505/29/2025 05:39 AM   Protocol Details EGFRCR or GFRAA > 50    CMP or BMP in past 12 months    Last BP reading less than 140/90    In person appointment or virtual visit in the past 12 mos or appointment in next 3 mos    Medication is active on med list        LOV: 5/19/2025  RTC: 1 months   Last Relevant Labs: 5/17/2024  Filled: 5/19/2025 #90 with 0 refills    Future Appointments   Date Time Provider Department Center   8/18/2025  4:00 PM Adela Miller MD [facility] [place]

## 2025-06-09 RX ORDER — TIRZEPATIDE 15 MG/.5ML
INJECTION, SOLUTION SUBCUTANEOUS
Qty: 6 ML | Refills: 0 | Status: SHIPPED | OUTPATIENT
Start: 2025-06-09

## 2025-06-09 NOTE — TELEPHONE ENCOUNTER
Requesting    Name from pharmacy: MOUNJARO 15MG/0.5ML INJ ( 4 PENS)         Will file in chart as: MOUNJARO 15 MG/0.5ML Subcutaneous Solution Auto-injector    Sig: ADMINISTER 15 MG UNDER THE SKIN 1 TIME A WEEK    Disp: 6 mL    Refills: 0 (Pharmacy requested: Not specified)    Start: 6/9/2025    Class: Normal    Non-formulary    Last ordered: 3 months ago (2/24/2025) by Adela Miller MD    Last refill: 3/17/2025    Rx #: [id number]    Diabetes Medication Protocol Xveldi9406/09/2025 09:22 AM   Protocol Details EGFRCR or GFRAA > 50    Medication is active on med list    Last A1C < 7.5 and within past 6 months    In person appointment or virtual visit in the past 6 mos or appointment in next 3 mos    Microalbumin procedure in past 12 months or taking ACE/ARB    GFR in the past 12 months        LOV: 5/19/2025  RTC: 1 months   Last Relevant Labs: 5/17/2025  Filled: 3/18/2025 #6 mL with 0 refills    Future Appointments   Date Time Provider Department Center   8/18/2025  4:00 PM Adela Miller MD [facility] [place]

## 2025-08-13 ENCOUNTER — HOSPITAL ENCOUNTER (INPATIENT)
Facility: HOSPITAL | Age: 65
LOS: 3 days | Discharge: HOME | End: 2025-08-16
Attending: EMERGENCY MEDICINE | Admitting: HOSPITALIST

## 2025-08-13 ENCOUNTER — APPOINTMENT (OUTPATIENT)
Dept: GENERAL RADIOLOGY | Facility: HOSPITAL | Age: 65
End: 2025-08-13

## 2025-08-13 DIAGNOSIS — I24.9 ACUTE CORONARY SYNDROME (HCC): ICD-10-CM

## 2025-08-13 DIAGNOSIS — R07.9 CHEST PAIN OF UNCERTAIN ETIOLOGY: Primary | ICD-10-CM

## 2025-08-13 DIAGNOSIS — R79.89 ELEVATED TROPONIN: ICD-10-CM

## 2025-08-13 LAB
ALBUMIN SERPL-MCNC: 4.6 G/DL (ref 3.2–4.8)
ALBUMIN/GLOB SERPL: 1.6 (ref 1–2)
ALP LIVER SERPL-CCNC: 96 U/L (ref 50–130)
ALT SERPL-CCNC: 28 U/L (ref 10–49)
ANION GAP SERPL CALC-SCNC: 13 MMOL/L (ref 0–18)
APTT PPP: 25.5 SECONDS (ref 23–36)
AST SERPL-CCNC: 26 U/L (ref ?–34)
BASOPHILS # BLD AUTO: 0.07 X10(3) UL (ref 0–0.2)
BASOPHILS NFR BLD AUTO: 0.6 %
BILIRUB SERPL-MCNC: 0.6 MG/DL (ref 0.2–1.1)
BUN BLD-MCNC: 12 MG/DL (ref 9–23)
CALCIUM BLD-MCNC: 9.8 MG/DL (ref 8.7–10.6)
CHLORIDE SERPL-SCNC: 106 MMOL/L (ref 98–112)
CHOLEST SERPL-MCNC: 142 MG/DL (ref ?–200)
CO2 SERPL-SCNC: 25 MMOL/L (ref 21–32)
CREAT BLD-MCNC: 1.38 MG/DL (ref 0.55–1.02)
EGFRCR SERPLBLD CKD-EPI 2021: 43 ML/MIN/1.73M2 (ref 60–?)
EOSINOPHIL # BLD AUTO: 0.2 X10(3) UL (ref 0–0.7)
EOSINOPHIL NFR BLD AUTO: 1.7 %
ERYTHROCYTE [DISTWIDTH] IN BLOOD BY AUTOMATED COUNT: 13.3 %
GLOBULIN PLAS-MCNC: 2.9 G/DL (ref 2–3.5)
GLUCOSE BLD-MCNC: 106 MG/DL (ref 70–99)
HCT VFR BLD AUTO: 37.5 % (ref 35–48)
HDLC SERPL-MCNC: 55 MG/DL (ref 40–59)
HGB BLD-MCNC: 13.3 G/DL (ref 12–16)
IMM GRANULOCYTES # BLD AUTO: 0.03 X10(3) UL (ref 0–1)
IMM GRANULOCYTES NFR BLD: 0.3 %
LDLC SERPL CALC-MCNC: 52 MG/DL (ref ?–100)
LYMPHOCYTES # BLD AUTO: 3.73 X10(3) UL (ref 1–4)
LYMPHOCYTES NFR BLD AUTO: 31.6 %
MCH RBC QN AUTO: 28.8 PG (ref 26–34)
MCHC RBC AUTO-ENTMCNC: 35.5 G/DL (ref 31–37)
MCV RBC AUTO: 81.2 FL (ref 80–100)
MONOCYTES # BLD AUTO: 1.11 X10(3) UL (ref 0.1–1)
MONOCYTES NFR BLD AUTO: 9.4 %
NEUTROPHILS # BLD AUTO: 6.67 X10 (3) UL (ref 1.5–7.7)
NEUTROPHILS # BLD AUTO: 6.67 X10(3) UL (ref 1.5–7.7)
NEUTROPHILS NFR BLD AUTO: 56.4 %
NONHDLC SERPL-MCNC: 87 MG/DL (ref ?–130)
OSMOLALITY SERPL CALC.SUM OF ELEC: 298 MOSM/KG (ref 275–295)
PLATELET # BLD AUTO: 290 10(3)UL (ref 150–450)
POTASSIUM SERPL-SCNC: 3.8 MMOL/L (ref 3.5–5.1)
PROT SERPL-MCNC: 7.5 G/DL (ref 5.7–8.2)
RBC # BLD AUTO: 4.62 X10(6)UL (ref 3.8–5.3)
SODIUM SERPL-SCNC: 144 MMOL/L (ref 136–145)
TRIGL SERPL-MCNC: 217 MG/DL (ref 30–149)
TROPONIN I SERPL HS-MCNC: 42 NG/L (ref ?–34)
VLDLC SERPL CALC-MCNC: 31 MG/DL (ref 0–30)
WBC # BLD AUTO: 11.8 X10(3) UL (ref 4–11)

## 2025-08-13 PROCEDURE — 71045 X-RAY EXAM CHEST 1 VIEW: CPT

## 2025-08-13 PROCEDURE — 99223 1ST HOSP IP/OBS HIGH 75: CPT | Performed by: INTERNAL MEDICINE

## 2025-08-13 RX ORDER — METHOCARBAMOL 500 MG/1
500 TABLET, FILM COATED ORAL 3 TIMES DAILY PRN
Status: DISCONTINUED | OUTPATIENT
Start: 2025-08-13 | End: 2025-08-16

## 2025-08-13 RX ORDER — SENNOSIDES 8.6 MG
17.2 TABLET ORAL NIGHTLY PRN
Status: DISCONTINUED | OUTPATIENT
Start: 2025-08-13 | End: 2025-08-16

## 2025-08-13 RX ORDER — ACETAMINOPHEN 500 MG
500 TABLET ORAL EVERY 4 HOURS PRN
Status: DISCONTINUED | OUTPATIENT
Start: 2025-08-13 | End: 2025-08-16

## 2025-08-13 RX ORDER — HEPARIN SODIUM 1000 [USP'U]/ML
5000 INJECTION, SOLUTION INTRAVENOUS; SUBCUTANEOUS ONCE
Status: COMPLETED | OUTPATIENT
Start: 2025-08-13 | End: 2025-08-13

## 2025-08-13 RX ORDER — ROSUVASTATIN CALCIUM 20 MG/1
20 TABLET, COATED ORAL NIGHTLY
Status: DISCONTINUED | OUTPATIENT
Start: 2025-08-13 | End: 2025-08-16

## 2025-08-13 RX ORDER — ONDANSETRON 2 MG/ML
4 INJECTION INTRAMUSCULAR; INTRAVENOUS EVERY 4 HOURS PRN
Status: DISCONTINUED | OUTPATIENT
Start: 2025-08-13 | End: 2025-08-13

## 2025-08-13 RX ORDER — BISACODYL 10 MG
10 SUPPOSITORY, RECTAL RECTAL
Status: DISCONTINUED | OUTPATIENT
Start: 2025-08-13 | End: 2025-08-16

## 2025-08-13 RX ORDER — POLYETHYLENE GLYCOL 3350 17 G/17G
17 POWDER, FOR SOLUTION ORAL DAILY PRN
Status: DISCONTINUED | OUTPATIENT
Start: 2025-08-13 | End: 2025-08-16

## 2025-08-13 RX ORDER — BENZONATATE 100 MG/1
200 CAPSULE ORAL 3 TIMES DAILY PRN
Status: DISCONTINUED | OUTPATIENT
Start: 2025-08-13 | End: 2025-08-16

## 2025-08-13 RX ORDER — ASPIRIN 81 MG/1
324 TABLET, CHEWABLE ORAL ONCE
Status: COMPLETED | OUTPATIENT
Start: 2025-08-13 | End: 2025-08-13

## 2025-08-13 RX ORDER — METOPROLOL SUCCINATE 100 MG/1
100 TABLET, EXTENDED RELEASE ORAL DAILY
Status: DISCONTINUED | OUTPATIENT
Start: 2025-08-14 | End: 2025-08-16

## 2025-08-13 RX ORDER — PROCHLORPERAZINE EDISYLATE 5 MG/ML
5 INJECTION INTRAMUSCULAR; INTRAVENOUS EVERY 8 HOURS PRN
Status: DISCONTINUED | OUTPATIENT
Start: 2025-08-13 | End: 2025-08-16

## 2025-08-13 RX ORDER — HEPARIN SODIUM AND DEXTROSE 10000; 5 [USP'U]/100ML; G/100ML
1000 INJECTION INTRAVENOUS ONCE
Status: COMPLETED | OUTPATIENT
Start: 2025-08-13 | End: 2025-08-14

## 2025-08-13 RX ORDER — ONDANSETRON 2 MG/ML
4 INJECTION INTRAMUSCULAR; INTRAVENOUS EVERY 6 HOURS PRN
Status: DISCONTINUED | OUTPATIENT
Start: 2025-08-13 | End: 2025-08-16

## 2025-08-13 RX ORDER — POTASSIUM CHLORIDE 1500 MG/1
40 TABLET, EXTENDED RELEASE ORAL ONCE
Status: COMPLETED | OUTPATIENT
Start: 2025-08-13 | End: 2025-08-13

## 2025-08-13 RX ORDER — NITROGLYCERIN 0.4 MG/1
0.4 TABLET SUBLINGUAL ONCE
Status: COMPLETED | OUTPATIENT
Start: 2025-08-13 | End: 2025-08-13

## 2025-08-13 RX ORDER — ASPIRIN 81 MG/1
81 TABLET ORAL DAILY
Status: DISCONTINUED | OUTPATIENT
Start: 2025-08-14 | End: 2025-08-16

## 2025-08-13 RX ORDER — FUROSEMIDE 40 MG/1
40 TABLET ORAL DAILY
Status: DISCONTINUED | OUTPATIENT
Start: 2025-08-14 | End: 2025-08-16

## 2025-08-13 RX ORDER — SODIUM PHOSPHATE, DIBASIC AND SODIUM PHOSPHATE, MONOBASIC 7; 19 G/230ML; G/230ML
1 ENEMA RECTAL ONCE AS NEEDED
Status: DISCONTINUED | OUTPATIENT
Start: 2025-08-13 | End: 2025-08-16

## 2025-08-13 RX ORDER — ECHINACEA PURPUREA EXTRACT 125 MG
1 TABLET ORAL
Status: DISCONTINUED | OUTPATIENT
Start: 2025-08-13 | End: 2025-08-16

## 2025-08-13 RX ORDER — LOSARTAN POTASSIUM 100 MG/1
100 TABLET ORAL DAILY
Status: DISCONTINUED | OUTPATIENT
Start: 2025-08-14 | End: 2025-08-16

## 2025-08-13 RX ORDER — HEPARIN SODIUM AND DEXTROSE 10000; 5 [USP'U]/100ML; G/100ML
INJECTION INTRAVENOUS CONTINUOUS
Status: DISCONTINUED | OUTPATIENT
Start: 2025-08-14 | End: 2025-08-16

## 2025-08-14 ENCOUNTER — APPOINTMENT (OUTPATIENT)
Dept: CV DIAGNOSTICS | Facility: HOSPITAL | Age: 65
End: 2025-08-14
Attending: NURSE PRACTITIONER

## 2025-08-14 PROBLEM — I21.4 NSTEMI (NON-ST ELEVATED MYOCARDIAL INFARCTION) (HCC): Status: ACTIVE | Noted: 2025-08-14

## 2025-08-14 LAB
ALBUMIN SERPL-MCNC: 4.2 G/DL (ref 3.2–4.8)
ALBUMIN/GLOB SERPL: 1.5 (ref 1–2)
ALP LIVER SERPL-CCNC: 90 U/L (ref 50–130)
ALT SERPL-CCNC: 25 U/L (ref 10–49)
ANION GAP SERPL CALC-SCNC: 10 MMOL/L (ref 0–18)
APTT PPP: 52.9 SECONDS (ref 23–36)
AST SERPL-CCNC: 25 U/L (ref ?–34)
ATRIAL RATE: 59 BPM
ATRIAL RATE: 74 BPM
BASOPHILS # BLD AUTO: 0.06 X10(3) UL (ref 0–0.2)
BASOPHILS NFR BLD AUTO: 0.8 %
BILIRUB SERPL-MCNC: 0.4 MG/DL (ref 0.2–1.1)
BUN BLD-MCNC: 13 MG/DL (ref 9–23)
CALCIUM BLD-MCNC: 9.5 MG/DL (ref 8.7–10.6)
CHLORIDE SERPL-SCNC: 108 MMOL/L (ref 98–112)
CO2 SERPL-SCNC: 25 MMOL/L (ref 21–32)
CREAT BLD-MCNC: 1.3 MG/DL (ref 0.55–1.02)
EGFRCR SERPLBLD CKD-EPI 2021: 46 ML/MIN/1.73M2 (ref 60–?)
EOSINOPHIL # BLD AUTO: 0.21 X10(3) UL (ref 0–0.7)
EOSINOPHIL NFR BLD AUTO: 2.7 %
ERYTHROCYTE [DISTWIDTH] IN BLOOD BY AUTOMATED COUNT: 13.2 %
GLOBULIN PLAS-MCNC: 2.8 G/DL (ref 2–3.5)
GLUCOSE BLD-MCNC: 109 MG/DL (ref 70–99)
HCT VFR BLD AUTO: 35.9 % (ref 35–48)
HGB BLD-MCNC: 12.8 G/DL (ref 12–16)
IMM GRANULOCYTES # BLD AUTO: 0.01 X10(3) UL (ref 0–1)
IMM GRANULOCYTES NFR BLD: 0.1 %
LYMPHOCYTES # BLD AUTO: 3.02 X10(3) UL (ref 1–4)
LYMPHOCYTES NFR BLD AUTO: 39 %
MAGNESIUM SERPL-MCNC: 1.9 MG/DL (ref 1.6–2.6)
MCH RBC QN AUTO: 29 PG (ref 26–34)
MCHC RBC AUTO-ENTMCNC: 35.7 G/DL (ref 31–37)
MCV RBC AUTO: 81.2 FL (ref 80–100)
MONOCYTES # BLD AUTO: 0.64 X10(3) UL (ref 0.1–1)
MONOCYTES NFR BLD AUTO: 8.3 %
NEUTROPHILS # BLD AUTO: 3.8 X10 (3) UL (ref 1.5–7.7)
NEUTROPHILS # BLD AUTO: 3.8 X10(3) UL (ref 1.5–7.7)
NEUTROPHILS NFR BLD AUTO: 49.1 %
OSMOLALITY SERPL CALC.SUM OF ELEC: 297 MOSM/KG (ref 275–295)
P AXIS: 31 DEGREES
P AXIS: 64 DEGREES
P-R INTERVAL: 164 MS
P-R INTERVAL: 168 MS
PLATELET # BLD AUTO: 248 10(3)UL (ref 150–450)
POTASSIUM SERPL-SCNC: 4.5 MMOL/L (ref 3.5–5.1)
POTASSIUM SERPL-SCNC: 4.5 MMOL/L (ref 3.5–5.1)
PROT SERPL-MCNC: 7 G/DL (ref 5.7–8.2)
Q-T INTERVAL: 404 MS
Q-T INTERVAL: 436 MS
QRS DURATION: 86 MS
QRS DURATION: 88 MS
QTC CALCULATION (BEZET): 431 MS
QTC CALCULATION (BEZET): 448 MS
R AXIS: -11 DEGREES
R AXIS: -19 DEGREES
RBC # BLD AUTO: 4.42 X10(6)UL (ref 3.8–5.3)
SODIUM SERPL-SCNC: 143 MMOL/L (ref 136–145)
T AXIS: 29 DEGREES
T AXIS: 41 DEGREES
TROPONIN I SERPL HS-MCNC: 38 NG/L (ref ?–34)
TROPONIN I SERPL HS-MCNC: 41 NG/L (ref ?–34)
TROPONIN I SERPL HS-MCNC: 41 NG/L (ref ?–34)
VENTRICULAR RATE: 59 BPM
VENTRICULAR RATE: 74 BPM
WBC # BLD AUTO: 7.7 X10(3) UL (ref 4–11)

## 2025-08-14 PROCEDURE — 93306 TTE W/DOPPLER COMPLETE: CPT | Performed by: NURSE PRACTITIONER

## 2025-08-14 PROCEDURE — 99232 SBSQ HOSP IP/OBS MODERATE 35: CPT | Performed by: STUDENT IN AN ORGANIZED HEALTH CARE EDUCATION/TRAINING PROGRAM

## 2025-08-14 RX ORDER — ASPIRIN 81 MG/1
324 TABLET, CHEWABLE ORAL ONCE
Status: COMPLETED | OUTPATIENT
Start: 2025-08-15 | End: 2025-08-15

## 2025-08-14 RX ORDER — SODIUM CHLORIDE 9 MG/ML
INJECTION, SOLUTION INTRAVENOUS
Status: COMPLETED | OUTPATIENT
Start: 2025-08-15 | End: 2025-08-15

## 2025-08-15 ENCOUNTER — APPOINTMENT (OUTPATIENT)
Dept: INTERVENTIONAL RADIOLOGY/VASCULAR | Facility: HOSPITAL | Age: 65
End: 2025-08-15

## 2025-08-15 LAB
APTT PPP: 28.9 SECONDS (ref 23–36)
APTT PPP: 44.2 SECONDS (ref 23–36)
ISTAT ACTIVATED CLOTTING TIME: 135 SECONDS (ref 74–137)

## 2025-08-15 PROCEDURE — 99232 SBSQ HOSP IP/OBS MODERATE 35: CPT | Performed by: STUDENT IN AN ORGANIZED HEALTH CARE EDUCATION/TRAINING PROGRAM

## 2025-08-15 RX ORDER — SODIUM CHLORIDE 9 MG/ML
INJECTION, SOLUTION INTRAVENOUS CONTINUOUS
Status: ACTIVE | OUTPATIENT
Start: 2025-08-15 | End: 2025-08-15

## 2025-08-15 RX ORDER — HEPARIN SODIUM 5000 [USP'U]/ML
INJECTION, SOLUTION INTRAVENOUS; SUBCUTANEOUS
Status: COMPLETED
Start: 2025-08-15 | End: 2025-08-15

## 2025-08-15 RX ORDER — LIDOCAINE HYDROCHLORIDE 10 MG/ML
INJECTION, SOLUTION EPIDURAL; INFILTRATION; INTRACAUDAL; PERINEURAL
Status: COMPLETED
Start: 2025-08-15 | End: 2025-08-15

## 2025-08-15 RX ORDER — MIDAZOLAM HYDROCHLORIDE 1 MG/ML
INJECTION INTRAMUSCULAR; INTRAVENOUS
Status: COMPLETED
Start: 2025-08-15 | End: 2025-08-15

## 2025-08-15 RX ORDER — IOPAMIDOL 755 MG/ML
100 INJECTION, SOLUTION INTRAVASCULAR
Status: COMPLETED | OUTPATIENT
Start: 2025-08-15 | End: 2025-08-15

## 2025-08-16 VITALS
OXYGEN SATURATION: 95 % | TEMPERATURE: 98 F | SYSTOLIC BLOOD PRESSURE: 119 MMHG | DIASTOLIC BLOOD PRESSURE: 76 MMHG | WEIGHT: 243.38 LBS | HEIGHT: 66 IN | HEART RATE: 70 BPM | BODY MASS INDEX: 39.12 KG/M2 | RESPIRATION RATE: 20 BRPM

## 2025-08-16 PROCEDURE — 99239 HOSP IP/OBS DSCHRG MGMT >30: CPT | Performed by: STUDENT IN AN ORGANIZED HEALTH CARE EDUCATION/TRAINING PROGRAM

## 2025-08-18 ENCOUNTER — PATIENT OUTREACH (OUTPATIENT)
Dept: CASE MANAGEMENT | Age: 65
End: 2025-08-18

## 2025-08-18 ENCOUNTER — OFFICE VISIT (OUTPATIENT)
Dept: INTERNAL MEDICINE CLINIC | Facility: CLINIC | Age: 65
End: 2025-08-18

## 2025-08-18 ENCOUNTER — PATIENT OUTREACH (OUTPATIENT)
Age: 65
End: 2025-08-18

## 2025-08-18 VITALS
SYSTOLIC BLOOD PRESSURE: 116 MMHG | DIASTOLIC BLOOD PRESSURE: 82 MMHG | OXYGEN SATURATION: 96 % | TEMPERATURE: 98 F | HEART RATE: 83 BPM | BODY MASS INDEX: 38.89 KG/M2 | HEIGHT: 66 IN | WEIGHT: 242 LBS

## 2025-08-18 DIAGNOSIS — I24.9 ACUTE CORONARY SYNDROME (HCC): Primary | ICD-10-CM

## 2025-08-18 DIAGNOSIS — I10 PRIMARY HYPERTENSION: ICD-10-CM

## 2025-08-18 DIAGNOSIS — E11.59 TYPE 2 DIABETES MELLITUS WITH OTHER CIRCULATORY COMPLICATION, WITHOUT LONG-TERM CURRENT USE OF INSULIN (HCC): ICD-10-CM

## 2025-08-18 DIAGNOSIS — E66.9 OBESITY (BMI 30-39.9): ICD-10-CM

## 2025-08-18 PROBLEM — R07.9 CHEST PAIN OF UNCERTAIN ETIOLOGY: Status: RESOLVED | Noted: 2025-08-13 | Resolved: 2025-08-18

## 2025-08-18 PROBLEM — R79.89 ELEVATED TROPONIN: Status: RESOLVED | Noted: 2025-08-13 | Resolved: 2025-08-18

## 2025-08-18 RX ORDER — TIRZEPATIDE 15 MG/.5ML
15 INJECTION, SOLUTION SUBCUTANEOUS WEEKLY
Qty: 6 ML | Refills: 0 | Status: SHIPPED | OUTPATIENT
Start: 2025-08-18

## 2025-08-25 RX ORDER — IRBESARTAN 300 MG/1
300 TABLET ORAL DAILY
Qty: 90 TABLET | Refills: 0 | Status: SHIPPED | OUTPATIENT
Start: 2025-08-25

## (undated) NOTE — LETTER
23    Patient:  Orquidea Kwan                 : [birth year]    To Whom It May Concern:       Patient is cleared to resume Cardiac rehab with no limitations.        Sincerely,     Paige Lindo M.D.

## (undated) NOTE — MR AVS SNAPSHOT
After Visit Summary   6/30/2020    Saddie Castleman    MRN: [id number]           Visit Information     Date & Time  6/30/2020  8:30 AM Provider  GABBY Wilder [address], [address] [facility] Dept.  Phone  [phone] Need for pneumococcal vaccination   [206617]    Spasm of muscle of lower back   [[id number]]             We Ordered the Following     Normal Orders This Visit    HPV HIGH RISK , THIN PREP COLLECTION [YSW0515 CUSTOM]     PNEUMOCOCCAL IMM (Sharri Diaz) [46203 CPT You don’t need to join a gym. Home exercises work great. Put more priority on exercise in your life           [facility] now offers Video Visits through [address] for adult and pediatric patients.   Video Visits are available Monday - Friday for many common condit [address]   Monday – Friday  10:00 am – 10:00 pm   Saturday – Sunday  10:00 am – 4:00 pm     [address]   Monday – Friday  4:00 pm – 10:00 pm   Saturday – Sunday  10:00 am – 4:00 pm  WALK-IN CARE  Emergency Medicine Providers  Conditions

## (undated) NOTE — LETTER
Milagros Hartmann M.D., F.A.C.S. Kim Valdes M.D., F.A.C.S. Emmett Gomez M.D., Rock Vu. SHAVONNE Barajas M.D., F.A.C.S. Kvng Urbina. Liseth Baltazar M.D., F.A.C.S. Karla Sam M.D. BRIGIDO Bruno M.D., F.A.C.S. Alexander Mcallister. Moo Benitez M.D., F.A.C.S. Stephon Mack M.D., F.A.C.S. Jennifer Das M.D., F.A.C.S. Kavon Ching M.D. F.A.C.S. Faith Boyce. Yodit Wong M.D., F.A.C.S. Ayo Miller M.D., F.A.C.S. Esteban Easton M.D., F.A.C.S., F.A.C.CAlethea Chambers M.D., F.A.C.SAlethea Diaz M.D., F.A.C.S. Dvaid Talley M.D., F.A.C.S. Cherelle Canseco. Karrie Pereira M.D., F.A.C.S. NOA Olivas Se, M.D., Rock Vu. C.S  Nolan Fernández M.D. Cherrie Anderson M.D., F.A.C.S. Colin Chacko. Alethea Mayo M.D., F.A.C.S. NOA Morgan M.D.  Charisse Pan M.D. PhD.  Anton Lacy M.D. Bebeto Jackson M.D. F A C S. Glenroy Linen. Robinson Barth M.D. Jordana Vasquez M.D. Ramon Fowler M.D. Patricia Vincent M.D.   Jazmín Hughes D.O., F.A.C.S. Carl Valencia M.D., F.A.C.S. Addie Jack M.D. Welcome to [facility] As you contemplate possible surgical treatment, it is very important to us that you understand fully what is being discussed, that all of your questions have been answered, and that your options for treatment have been fully explained. To that end, on the following page we will ask you some questions to make certain that you understand everything which has been explained to you. Included in this understanding is that there are both surgical and nonsurgical treatments available for you, that you have options regarding where your care is given, and what doctors are involved in your care. Included in these options would, of course, be the option to elect for no treatment whatsoever.  We especially want to be sure that you have had a chance to have all of your questions and concerns answered. If there are any issues which have not been adequately addressed, we ask you to bring them forward so that we can thoroughly address them. A patient who is fully informed and understands their condition and options for treatment, as well as potential adverse effects of treatment, is going to be a patient who receives the most benefit out of care rendered. Our goal in addition to providing excellent surgical care is to provide the necessary information to you and your family in order to make decisions which are appropriate relative to your own care. Please take the time necessary to read and answer the questions on the next page. Again, if you have any questions, bring them forward and we will certainly address them. Sincerely,    [facility].    _______________________  ____________________________________  Date:                                             Patient Signature  ________________________  Joceline   Witness Consent Form         Revised: 2015  Patient Name: Joceline      : 10/13/1960                 Printed: 6/15/13 9:43 AM     Medical Record #: [id number]                Page: 1 of  2        [facility] Supplemental Consent Form    A [facility] ([facility]) surgeon has met with me and explained the matter of my illness, and what treatments might be available to improve my condition. As a result of that conversation, I understand the following:    A [facility] surgeon met with me and explained, in detail, the nature of my condition for which surgery is being contemplated.  The procedure to be performed  Is: CORONARY ARTERY BYPASS GRAFT            Yes _____ No _____    A [facility] surgeon has explained to me that there are alternatives to surgery which might include no surgery, medical therapy, or interventional treatment, among other options and the risks and benefits of the different treatment options:    Yes _____ No _____    A [facility] surgeon as explained to me that if I should so desire, he/she is willing to explain my case and the surgical and non-surgical options to family members: Yes _____ No _____    A [facility] surgeon has answered all of my questions regarding the topics we have discussed. I have been invited to ask more questions:  Yes _____ No _____    A [facility] surgeon has explained to me that if I seek other options or wish treatment at another facility in [place] or [place], or anywhere in the [place], that his/her office will assist me in making such accommodations:   Yes _____ No _____    A [facility] surgeon has explained to me, that death, risk of bleeding, stroke, multi-organ failure, heart attack or other complications are risks for the proposed surgical procedure: Yes _____ No _____    A [facility] surgeon has explained to me that I have the right to cancel or postpone the surgery at any time prior to the start of surgery: Yes _____ No _____    The nature and options for treatment for my condition have been explained to me, in detail, by a [facility] surgeon and all questions have been answered to my satisfaction. I understand that I am not required to undergo surgery, and further, that if I so desire, I could have surgery accomplished by another surgeon or at another institution. I understand and accept that which has been explained to me.  I am able to make my decisions knowingly and willfully based on the data.    ______________________   __________________________________  Date       Patient Signature  ______________________________ Zamzam April  Witness Consent Form   Patient Name: Jones Turner: 10/13/1960                 Medical Record #: [id number]    Page: 2 of 2        Printed: September 25, 2023

## (undated) NOTE — LETTER
05/11/21        [address] [address]      Dear Raffaele Sanders,    Our records indicate that you have outstanding lab work and or testing that was ordered for you and has not yet been completed:  Orders Placed This Encounter

## (undated) NOTE — LETTER
08/28/21        [address] [address]      Dear Ajith Man,    Our records indicate that you have outstanding lab work and or testing that was ordered for you and has not yet been completed:  Orders Placed This Encounter

## (undated) NOTE — LETTER
24      Orthopedic Surgery   Pre-Operative Clearance Request    Patient Name:   Marquita Cooper             :   10/13/1960    Surgeon: Dr. Hunt             Date of Surgery: 3/14/2024    Surgical Procedure: RIGHT KNEE ARTHROSCOPY PARTIAL MEDIAL MENISCECTOMY SYNOVECTOMY ABRASIONPLASTY       MUST COMPLETE ALL OF THE FOLLOWING 2-3 WEEKS PRIOR TO YOUR SURGERY TO AVOID CANCELLATION, DUE TO THE RULE THEIR WILL BE NO EXCEPTIONS!      [x]  History and Physical      [x]  Medical  Clearance                                                                                                    **Please fax test results, H&P, and clearance to [phone] and to P.A.T at [phone]**

## (undated) NOTE — Clinical Note
NAZARIO. Pt completed TCM. Patient has upcoming TCM/HFU appointment scheduled on 10/23/23. Pt requesting for TCM appointment to be virtual, okay to change appointment? TE to PCP office re: changing visit type. Thank you.

## (undated) NOTE — MR AVS SNAPSHOT
[place]  [address] [address] [facility] [address]  [address] [facility] [place] [address] [phone]               Thank you for choosing us for your health care visit with Gabby Farley MD.  We are glad to serve you and happy to provide you with this s Cyclobenzaprine HCl 10 MG Tabs   TAKE 1 TABLET BY MOUTH AT BEDTIME   Commonly known as:  cyclobenzaprine           escitalopram 10 MG Tabs   TAKE 1 TABLET BY MOUTH EVERY DAY   What changed:  Another medication with the same name was removed.  Continue joel [email or web address]. If you've recently had a stay at the Hospital you can access your discharge instructions in [place] by going to Visits < Admission Summaries.  If you've been to the Emergency Department or your doctor's office, you can view yo Visit [facility] online at  [email or web address]